# Patient Record
Sex: MALE | Race: WHITE
[De-identification: names, ages, dates, MRNs, and addresses within clinical notes are randomized per-mention and may not be internally consistent; named-entity substitution may affect disease eponyms.]

---

## 2022-05-14 ENCOUNTER — HOSPITAL ENCOUNTER (EMERGENCY)
Dept: HOSPITAL 79 - ER1 | Age: 72
Discharge: HOME | End: 2022-05-14
Payer: MEDICARE

## 2022-05-14 DIAGNOSIS — E11.9: ICD-10-CM

## 2022-05-14 DIAGNOSIS — J44.9: ICD-10-CM

## 2022-05-14 DIAGNOSIS — Z88.0: ICD-10-CM

## 2022-05-14 DIAGNOSIS — R79.9: Primary | ICD-10-CM

## 2022-05-14 DIAGNOSIS — I10: ICD-10-CM

## 2022-05-14 DIAGNOSIS — Z88.2: ICD-10-CM

## 2022-05-14 LAB
BUN/CREATININE RATIO: 31 (ref 0–10)
HGB BLD-MCNC: 14.7 GM/DL (ref 14–17.5)
RED BLOOD COUNT: 4.65 M/UL (ref 4.2–5.5)
WHITE BLOOD COUNT: 5.7 K/UL (ref 4.5–11)

## 2025-06-30 ENCOUNTER — HOSPITAL ENCOUNTER (INPATIENT)
Facility: HOSPITAL | Age: 75
LOS: 8 days | Discharge: HOME | End: 2025-07-09
Attending: FAMILY MEDICINE | Admitting: INTERNAL MEDICINE
Payer: MEDICARE

## 2025-06-30 DIAGNOSIS — I50.31 ACUTE DIASTOLIC HEART FAILURE: Primary | ICD-10-CM

## 2025-06-30 DIAGNOSIS — I35.0 AORTIC VALVE STENOSIS, ETIOLOGY OF CARDIAC VALVE DISEASE UNSPECIFIED: ICD-10-CM

## 2025-06-30 DIAGNOSIS — J96.01 ACUTE RESPIRATORY FAILURE WITH HYPOXIA: ICD-10-CM

## 2025-06-30 PROCEDURE — 94799 UNLISTED PULMONARY SVC/PX: CPT

## 2025-06-30 PROCEDURE — G0378 HOSPITAL OBSERVATION PER HR: HCPCS

## 2025-06-30 RX ORDER — BUDESONIDE AND FORMOTEROL FUMARATE DIHYDRATE 160; 4.5 UG/1; UG/1
2 AEROSOL RESPIRATORY (INHALATION)
COMMUNITY

## 2025-06-30 RX ORDER — ALBUTEROL SULFATE 0.83 MG/ML
2.5 SOLUTION RESPIRATORY (INHALATION) EVERY 6 HOURS PRN
COMMUNITY

## 2025-06-30 RX ORDER — TORSEMIDE 20 MG/1
20 TABLET ORAL DAILY
Status: ON HOLD | COMMUNITY
End: 2025-07-09

## 2025-06-30 RX ORDER — DILTIAZEM HYDROCHLORIDE 360 MG/1
360 CAPSULE, EXTENDED RELEASE ORAL DAILY
COMMUNITY

## 2025-06-30 RX ORDER — ESOMEPRAZOLE MAGNESIUM 40 MG/1
CAPSULE, DELAYED RELEASE ORAL
COMMUNITY
Start: 2025-03-07

## 2025-06-30 RX ORDER — CARVEDILOL 3.12 MG/1
3.12 TABLET ORAL 2 TIMES DAILY WITH MEALS
COMMUNITY
End: 2025-07-09 | Stop reason: HOSPADM

## 2025-06-30 RX ORDER — GLIMEPIRIDE 1 MG/1
1 TABLET ORAL
Status: ON HOLD | COMMUNITY
End: 2025-07-09

## 2025-06-30 RX ORDER — FERROUS SULFATE 325(65) MG
325 TABLET ORAL
COMMUNITY

## 2025-06-30 RX ORDER — LISINOPRIL 10 MG/1
TABLET ORAL
Status: ON HOLD | COMMUNITY
Start: 2025-03-27 | End: 2025-07-01

## 2025-06-30 RX ORDER — PRAVASTATIN SODIUM 20 MG
20 TABLET ORAL NIGHTLY
COMMUNITY

## 2025-06-30 RX ORDER — ASPIRIN 81 MG/1
81 TABLET ORAL DAILY
COMMUNITY

## 2025-06-30 RX ORDER — NIFEDIPINE 30 MG/1
30 TABLET, EXTENDED RELEASE ORAL DAILY
COMMUNITY
End: 2025-07-09 | Stop reason: HOSPADM

## 2025-07-01 ENCOUNTER — APPOINTMENT (OUTPATIENT)
Dept: CARDIOLOGY | Facility: HOSPITAL | Age: 75
End: 2025-07-01
Payer: MEDICARE

## 2025-07-01 ENCOUNTER — APPOINTMENT (OUTPATIENT)
Dept: GENERAL RADIOLOGY | Facility: HOSPITAL | Age: 75
End: 2025-07-01
Payer: MEDICARE

## 2025-07-01 PROBLEM — I50.9 HEART FAILURE: Status: ACTIVE | Noted: 2025-07-01

## 2025-07-01 LAB
ALBUMIN SERPL-MCNC: 2.8 G/DL (ref 3.5–5.2)
ALBUMIN/GLOB SERPL: 0.7 G/DL
ALP SERPL-CCNC: 70 U/L (ref 39–117)
ALT SERPL W P-5'-P-CCNC: 37 U/L (ref 1–41)
ANION GAP SERPL CALCULATED.3IONS-SCNC: 14.5 MMOL/L (ref 5–15)
AORTIC DIMENSIONLESS INDEX: 0.41 (DI)
AST SERPL-CCNC: 21 U/L (ref 1–40)
AV MEAN PRESS GRAD SYS DOP V1V2: 20 MMHG
AV VMAX SYS DOP: 308 CM/SEC
BASOPHILS # BLD AUTO: 0.04 10*3/MM3 (ref 0–0.2)
BASOPHILS NFR BLD AUTO: 0.2 % (ref 0–1.5)
BH CV ECHO MEAS - AO MAX PG: 37.9 MMHG
BH CV ECHO MEAS - AO ROOT AREA (BSA CORRECTED): 1.6 CM2
BH CV ECHO MEAS - AO ROOT DIAM: 3.3 CM
BH CV ECHO MEAS - AO V2 VTI: 53.3 CM
BH CV ECHO MEAS - AVA(I,D): 1.3 CM2
BH CV ECHO MEAS - EDV(CUBED): 64 ML
BH CV ECHO MEAS - EDV(MOD-SP2): 170 ML
BH CV ECHO MEAS - EDV(MOD-SP4): 141 ML
BH CV ECHO MEAS - EF(MOD-SP2): 58.8 %
BH CV ECHO MEAS - EF(MOD-SP4): 59.6 %
BH CV ECHO MEAS - ESV(CUBED): 15.6 ML
BH CV ECHO MEAS - ESV(MOD-SP2): 70 ML
BH CV ECHO MEAS - ESV(MOD-SP4): 56.9 ML
BH CV ECHO MEAS - FS: 37.5 %
BH CV ECHO MEAS - IVS/LVPW: 1 CM
BH CV ECHO MEAS - IVSD: 1.6 CM
BH CV ECHO MEAS - LA DIMENSION: 4.3 CM
BH CV ECHO MEAS - LAT PEAK E' VEL: 7.7 CM/SEC
BH CV ECHO MEAS - LV DIASTOLIC VOL/BSA (35-75): 69.6 CM2
BH CV ECHO MEAS - LV MASS(C)D: 257.9 GRAMS
BH CV ECHO MEAS - LV MAX PG: 4.7 MMHG
BH CV ECHO MEAS - LV MEAN PG: 2.5 MMHG
BH CV ECHO MEAS - LV SYSTOLIC VOL/BSA (12-30): 28.1 CM2
BH CV ECHO MEAS - LV V1 MAX: 108 CM/SEC
BH CV ECHO MEAS - LV V1 VTI: 22.1 CM
BH CV ECHO MEAS - LVIDD: 4 CM
BH CV ECHO MEAS - LVIDS: 2.5 CM
BH CV ECHO MEAS - LVOT AREA: 3.1 CM2
BH CV ECHO MEAS - LVOT DIAM: 2 CM
BH CV ECHO MEAS - LVPWD: 1.6 CM
BH CV ECHO MEAS - MED PEAK E' VEL: 5.4 CM/SEC
BH CV ECHO MEAS - MV A MAX VEL: 111 CM/SEC
BH CV ECHO MEAS - MV DEC SLOPE: 749 CM/SEC2
BH CV ECHO MEAS - MV DEC TIME: 0.16 SEC
BH CV ECHO MEAS - MV E MAX VEL: 119 CM/SEC
BH CV ECHO MEAS - MV E/A: 1.07
BH CV ECHO MEAS - MV MAX PG: 6.5 MMHG
BH CV ECHO MEAS - MV MEAN PG: 3.2 MMHG
BH CV ECHO MEAS - MV P1/2T: 49.7 MSEC
BH CV ECHO MEAS - MV V2 VTI: 35.3 CM
BH CV ECHO MEAS - MVA(P1/2T): 4.4 CM2
BH CV ECHO MEAS - MVA(VTI): 1.97 CM2
BH CV ECHO MEAS - SV(LVOT): 69.4 ML
BH CV ECHO MEAS - SV(MOD-SP2): 100 ML
BH CV ECHO MEAS - SV(MOD-SP4): 84.1 ML
BH CV ECHO MEAS - SVI(LVOT): 34.3 ML/M2
BH CV ECHO MEAS - SVI(MOD-SP2): 49.4 ML/M2
BH CV ECHO MEAS - SVI(MOD-SP4): 41.5 ML/M2
BH CV ECHO MEAS - TAPSE (>1.6): 1.88 CM
BH CV ECHO MEASUREMENTS AVERAGE E/E' RATIO: 18.17
BH CV XLRA - RV BASE: 3.6 CM
BH CV XLRA - RV LENGTH: 6.4 CM
BH CV XLRA - RV MID: 2.7 CM
BH CV XLRA - TDI S': 6.4 CM/SEC
BH CV XLRA MEAS LEFT DIST CCA EDV: 15.6 CM/SEC
BH CV XLRA MEAS LEFT DIST CCA PSV: 74.7 CM/SEC
BH CV XLRA MEAS LEFT DIST ICA EDV: 27.1 CM/SEC
BH CV XLRA MEAS LEFT DIST ICA PSV: 120 CM/SEC
BH CV XLRA MEAS LEFT ICA/CCA RATIO: 2.16
BH CV XLRA MEAS LEFT MID CCA EDV: 18.2 CM/SEC
BH CV XLRA MEAS LEFT MID CCA PSV: 96.1 CM/SEC
BH CV XLRA MEAS LEFT MID ICA EDV: 82.8 CM/SEC
BH CV XLRA MEAS LEFT MID ICA PSV: 111 CM/SEC
BH CV XLRA MEAS LEFT PROX CCA EDV: 15.6 CM/SEC
BH CV XLRA MEAS LEFT PROX CCA PSV: 92.2 CM/SEC
BH CV XLRA MEAS LEFT PROX ECA EDV: 10.5 CM/SEC
BH CV XLRA MEAS LEFT PROX ECA PSV: 177.4 CM/SEC
BH CV XLRA MEAS LEFT PROX ICA EDV: 40 CM/SEC
BH CV XLRA MEAS LEFT PROX ICA PSV: 161.4 CM/SEC
BH CV XLRA MEAS LEFT PROX SCLA PSV: 92.1 CM/SEC
BH CV XLRA MEAS LEFT VERTEBRAL A EDV: 8.1 CM/SEC
BH CV XLRA MEAS LEFT VERTEBRAL A PSV: 15.9 CM/SEC
BH CV XLRA MEAS RIGHT DIST CCA EDV: 16.1 CM/SEC
BH CV XLRA MEAS RIGHT DIST CCA PSV: 61.9 CM/SEC
BH CV XLRA MEAS RIGHT DIST ICA EDV: 19.3 CM/SEC
BH CV XLRA MEAS RIGHT DIST ICA PSV: 117.8 CM/SEC
BH CV XLRA MEAS RIGHT ICA/CCA RATIO: 1.81
BH CV XLRA MEAS RIGHT MID CCA EDV: 11.9 CM/SEC
BH CV XLRA MEAS RIGHT MID CCA PSV: 73.2 CM/SEC
BH CV XLRA MEAS RIGHT MID ICA EDV: 19.3 CM/SEC
BH CV XLRA MEAS RIGHT MID ICA PSV: 107.8 CM/SEC
BH CV XLRA MEAS RIGHT PROX CCA EDV: 16.7 CM/SEC
BH CV XLRA MEAS RIGHT PROX CCA PSV: 69.6 CM/SEC
BH CV XLRA MEAS RIGHT PROX ECA EDV: 19 CM/SEC
BH CV XLRA MEAS RIGHT PROX ECA PSV: 350.4 CM/SEC
BH CV XLRA MEAS RIGHT PROX ICA EDV: 20.7 CM/SEC
BH CV XLRA MEAS RIGHT PROX ICA PSV: 112.1 CM/SEC
BH CV XLRA MEAS RIGHT PROX SCLA PSV: 219 CM/SEC
BH CV XLRA MEAS RIGHT VERTEBRAL A EDV: 20.7 CM/SEC
BH CV XLRA MEAS RIGHT VERTEBRAL A PSV: 92.1 CM/SEC
BILIRUB SERPL-MCNC: <0.2 MG/DL (ref 0–1.2)
BUN SERPL-MCNC: 75.5 MG/DL (ref 8–23)
BUN/CREAT SERPL: 26.1 (ref 7–25)
CALCIUM SPEC-SCNC: 8.6 MG/DL (ref 8.6–10.5)
CHLORIDE SERPL-SCNC: 105 MMOL/L (ref 98–107)
CO2 SERPL-SCNC: 18.5 MMOL/L (ref 22–29)
CREAT SERPL-MCNC: 2.89 MG/DL (ref 0.76–1.27)
DEPRECATED RDW RBC AUTO: 58.2 FL (ref 37–54)
EGFRCR SERPLBLD CKD-EPI 2021: 22.1 ML/MIN/1.73
EOSINOPHIL # BLD AUTO: 0 10*3/MM3 (ref 0–0.4)
EOSINOPHIL NFR BLD AUTO: 0 % (ref 0.3–6.2)
ERYTHROCYTE [DISTWIDTH] IN BLOOD BY AUTOMATED COUNT: 18.8 % (ref 12.3–15.4)
GEN 5 1HR TROPONIN T REFLEX: 37 NG/L
GLOBULIN UR ELPH-MCNC: 3.9 GM/DL
GLUCOSE BLDC GLUCOMTR-MCNC: 203 MG/DL (ref 70–130)
GLUCOSE BLDC GLUCOMTR-MCNC: 226 MG/DL (ref 70–130)
GLUCOSE BLDC GLUCOMTR-MCNC: 248 MG/DL (ref 70–130)
GLUCOSE BLDC GLUCOMTR-MCNC: 266 MG/DL (ref 70–130)
GLUCOSE SERPL-MCNC: 320 MG/DL (ref 65–99)
HBA1C MFR BLD: 7.33 % (ref 4.8–5.6)
HCT VFR BLD AUTO: 32.7 % (ref 37.5–51)
HGB BLD-MCNC: 9.8 G/DL (ref 13–17.7)
IMM GRANULOCYTES # BLD AUTO: 0.34 10*3/MM3 (ref 0–0.05)
IMM GRANULOCYTES NFR BLD AUTO: 1.6 % (ref 0–0.5)
INR PPP: 1.16 (ref 0.89–1.12)
LEFT ATRIUM VOLUME INDEX: 42.5 ML/M2
LV EF BIPLANE MOD: 61.5 %
LYMPHOCYTES # BLD AUTO: 0.61 10*3/MM3 (ref 0.7–3.1)
LYMPHOCYTES NFR BLD AUTO: 2.9 % (ref 19.6–45.3)
MAGNESIUM SERPL-MCNC: 2.2 MG/DL (ref 1.6–2.4)
MAGNESIUM SERPL-MCNC: 2.3 MG/DL (ref 1.6–2.4)
MCH RBC QN AUTO: 25.7 PG (ref 26.6–33)
MCHC RBC AUTO-ENTMCNC: 30 G/DL (ref 31.5–35.7)
MCV RBC AUTO: 85.6 FL (ref 79–97)
MONOCYTES # BLD AUTO: 0.54 10*3/MM3 (ref 0.1–0.9)
MONOCYTES NFR BLD AUTO: 2.6 % (ref 5–12)
NEUTROPHILS NFR BLD AUTO: 19.57 10*3/MM3 (ref 1.7–7)
NEUTROPHILS NFR BLD AUTO: 92.7 % (ref 42.7–76)
NRBC BLD AUTO-RTO: 0 /100 WBC (ref 0–0.2)
NT-PROBNP SERPL-MCNC: 4325 PG/ML (ref 0–900)
PLATELET # BLD AUTO: 406 10*3/MM3 (ref 140–450)
PMV BLD AUTO: 8.7 FL (ref 6–12)
POTASSIUM SERPL-SCNC: 4.6 MMOL/L (ref 3.5–5.2)
PROT SERPL-MCNC: 6.7 G/DL (ref 6–8.5)
PROTHROMBIN TIME: 15.5 SECONDS (ref 12.2–15.3)
RBC # BLD AUTO: 3.82 10*6/MM3 (ref 4.14–5.8)
SODIUM SERPL-SCNC: 138 MMOL/L (ref 136–145)
TROPONIN T % DELTA: 9
TROPONIN T NUMERIC DELTA: 3 NG/L
TROPONIN T SERPL HS-MCNC: 34 NG/L
TSH SERPL DL<=0.05 MIU/L-ACNC: 0.63 UIU/ML (ref 0.27–4.2)
WBC NRBC COR # BLD AUTO: 21.1 10*3/MM3 (ref 3.4–10.8)

## 2025-07-01 PROCEDURE — 87070 CULTURE OTHR SPECIMN AEROBIC: CPT | Performed by: NURSE PRACTITIONER

## 2025-07-01 PROCEDURE — 83036 HEMOGLOBIN GLYCOSYLATED A1C: CPT | Performed by: NURSE PRACTITIONER

## 2025-07-01 PROCEDURE — 84443 ASSAY THYROID STIM HORMONE: CPT | Performed by: NURSE PRACTITIONER

## 2025-07-01 PROCEDURE — 87040 BLOOD CULTURE FOR BACTERIA: CPT | Performed by: NURSE PRACTITIONER

## 2025-07-01 PROCEDURE — 93010 ELECTROCARDIOGRAM REPORT: CPT | Performed by: INTERNAL MEDICINE

## 2025-07-01 PROCEDURE — 94640 AIRWAY INHALATION TREATMENT: CPT

## 2025-07-01 PROCEDURE — 82948 REAGENT STRIP/BLOOD GLUCOSE: CPT

## 2025-07-01 PROCEDURE — 93306 TTE W/DOPPLER COMPLETE: CPT

## 2025-07-01 PROCEDURE — 85025 COMPLETE CBC W/AUTO DIFF WBC: CPT | Performed by: PHYSICIAN ASSISTANT

## 2025-07-01 PROCEDURE — 99221 1ST HOSP IP/OBS SF/LOW 40: CPT | Performed by: THORACIC SURGERY (CARDIOTHORACIC VASCULAR SURGERY)

## 2025-07-01 PROCEDURE — 99222 1ST HOSP IP/OBS MODERATE 55: CPT | Performed by: INTERNAL MEDICINE

## 2025-07-01 PROCEDURE — 87205 SMEAR GRAM STAIN: CPT | Performed by: NURSE PRACTITIONER

## 2025-07-01 PROCEDURE — 83735 ASSAY OF MAGNESIUM: CPT | Performed by: PHYSICIAN ASSISTANT

## 2025-07-01 PROCEDURE — 63710000001 INSULIN LISPRO (HUMAN) PER 5 UNITS: Performed by: NURSE PRACTITIONER

## 2025-07-01 PROCEDURE — 94664 DEMO&/EVAL PT USE INHALER: CPT

## 2025-07-01 PROCEDURE — 80053 COMPREHEN METABOLIC PANEL: CPT | Performed by: PHYSICIAN ASSISTANT

## 2025-07-01 PROCEDURE — 93880 EXTRACRANIAL BILAT STUDY: CPT | Performed by: INTERNAL MEDICINE

## 2025-07-01 PROCEDURE — 93880 EXTRACRANIAL BILAT STUDY: CPT

## 2025-07-01 PROCEDURE — 94799 UNLISTED PULMONARY SVC/PX: CPT

## 2025-07-01 PROCEDURE — 93005 ELECTROCARDIOGRAM TRACING: CPT | Performed by: PHYSICIAN ASSISTANT

## 2025-07-01 PROCEDURE — 84484 ASSAY OF TROPONIN QUANT: CPT | Performed by: PHYSICIAN ASSISTANT

## 2025-07-01 PROCEDURE — 25010000002 HEPARIN (PORCINE) PER 1000 UNITS: Performed by: INTERNAL MEDICINE

## 2025-07-01 PROCEDURE — 25010000002 HEPARIN (PORCINE) PER 1000 UNITS: Performed by: NURSE PRACTITIONER

## 2025-07-01 PROCEDURE — 25010000002 CEFEPIME PER 500 MG

## 2025-07-01 PROCEDURE — 71045 X-RAY EXAM CHEST 1 VIEW: CPT

## 2025-07-01 PROCEDURE — 85610 PROTHROMBIN TIME: CPT | Performed by: NURSE PRACTITIONER

## 2025-07-01 PROCEDURE — 63710000001 INSULIN LISPRO (HUMAN) PER 5 UNITS: Performed by: INTERNAL MEDICINE

## 2025-07-01 PROCEDURE — 83880 ASSAY OF NATRIURETIC PEPTIDE: CPT | Performed by: NURSE PRACTITIONER

## 2025-07-01 PROCEDURE — 83735 ASSAY OF MAGNESIUM: CPT | Performed by: NURSE PRACTITIONER

## 2025-07-01 PROCEDURE — 93306 TTE W/DOPPLER COMPLETE: CPT | Performed by: INTERNAL MEDICINE

## 2025-07-01 PROCEDURE — 99223 1ST HOSP IP/OBS HIGH 75: CPT | Performed by: INTERNAL MEDICINE

## 2025-07-01 RX ORDER — DEXTROSE MONOHYDRATE 25 G/50ML
25 INJECTION, SOLUTION INTRAVENOUS
Status: DISCONTINUED | OUTPATIENT
Start: 2025-07-01 | End: 2025-07-09 | Stop reason: HOSPADM

## 2025-07-01 RX ORDER — NITROGLYCERIN 0.4 MG/1
0.4 TABLET SUBLINGUAL
Status: DISCONTINUED | OUTPATIENT
Start: 2025-07-01 | End: 2025-07-07 | Stop reason: SDUPTHER

## 2025-07-01 RX ORDER — PRAVASTATIN SODIUM 20 MG
20 TABLET ORAL NIGHTLY
Status: DISCONTINUED | OUTPATIENT
Start: 2025-07-01 | End: 2025-07-09 | Stop reason: HOSPADM

## 2025-07-01 RX ORDER — ALBUTEROL SULFATE 0.83 MG/ML
2.5 SOLUTION RESPIRATORY (INHALATION) EVERY 6 HOURS PRN
Status: DISCONTINUED | OUTPATIENT
Start: 2025-07-01 | End: 2025-07-08 | Stop reason: SDUPTHER

## 2025-07-01 RX ORDER — CARVEDILOL 3.12 MG/1
3.12 TABLET ORAL 2 TIMES DAILY WITH MEALS
Status: DISCONTINUED | OUTPATIENT
Start: 2025-07-01 | End: 2025-07-01

## 2025-07-01 RX ORDER — METOPROLOL TARTRATE 25 MG/1
25 TABLET, FILM COATED ORAL EVERY 12 HOURS SCHEDULED
Status: DISCONTINUED | OUTPATIENT
Start: 2025-07-01 | End: 2025-07-03

## 2025-07-01 RX ORDER — ASPIRIN 81 MG/1
81 TABLET ORAL DAILY
Status: DISCONTINUED | OUTPATIENT
Start: 2025-07-01 | End: 2025-07-09 | Stop reason: HOSPADM

## 2025-07-01 RX ORDER — POLYETHYLENE GLYCOL 3350 17 G/17G
17 POWDER, FOR SOLUTION ORAL DAILY PRN
Status: DISCONTINUED | OUTPATIENT
Start: 2025-07-01 | End: 2025-07-09 | Stop reason: HOSPADM

## 2025-07-01 RX ORDER — AMOXICILLIN 250 MG
2 CAPSULE ORAL 2 TIMES DAILY PRN
Status: DISCONTINUED | OUTPATIENT
Start: 2025-07-01 | End: 2025-07-09 | Stop reason: HOSPADM

## 2025-07-01 RX ORDER — ACETAMINOPHEN 325 MG/1
650 TABLET ORAL EVERY 4 HOURS PRN
Status: DISCONTINUED | OUTPATIENT
Start: 2025-07-01 | End: 2025-07-07 | Stop reason: SDUPTHER

## 2025-07-01 RX ORDER — HEPARIN SODIUM 5000 [USP'U]/ML
5000 INJECTION, SOLUTION INTRAVENOUS; SUBCUTANEOUS EVERY 12 HOURS SCHEDULED
Status: DISCONTINUED | OUTPATIENT
Start: 2025-07-01 | End: 2025-07-09 | Stop reason: HOSPADM

## 2025-07-01 RX ORDER — FERROUS SULFATE 325(65) MG
325 TABLET ORAL
Status: DISCONTINUED | OUTPATIENT
Start: 2025-07-01 | End: 2025-07-08

## 2025-07-01 RX ORDER — LISINOPRIL 10 MG/1
10 TABLET ORAL
Status: DISCONTINUED | OUTPATIENT
Start: 2025-07-01 | End: 2025-07-02

## 2025-07-01 RX ORDER — INSULIN LISPRO 100 [IU]/ML
2-7 INJECTION, SOLUTION INTRAVENOUS; SUBCUTANEOUS
Status: DISCONTINUED | OUTPATIENT
Start: 2025-07-01 | End: 2025-07-09 | Stop reason: HOSPADM

## 2025-07-01 RX ORDER — SODIUM CHLORIDE 0.9 % (FLUSH) 0.9 %
10 SYRINGE (ML) INJECTION EVERY 12 HOURS SCHEDULED
Status: DISCONTINUED | OUTPATIENT
Start: 2025-07-01 | End: 2025-07-09 | Stop reason: HOSPADM

## 2025-07-01 RX ORDER — SODIUM CHLORIDE 9 MG/ML
40 INJECTION, SOLUTION INTRAVENOUS AS NEEDED
Status: DISCONTINUED | OUTPATIENT
Start: 2025-07-01 | End: 2025-07-09 | Stop reason: HOSPADM

## 2025-07-01 RX ORDER — DILTIAZEM HYDROCHLORIDE 120 MG/1
120 CAPSULE, COATED, EXTENDED RELEASE ORAL
Status: DISCONTINUED | OUTPATIENT
Start: 2025-07-01 | End: 2025-07-02

## 2025-07-01 RX ORDER — NICOTINE POLACRILEX 4 MG
15 LOZENGE BUCCAL
Status: DISCONTINUED | OUTPATIENT
Start: 2025-07-01 | End: 2025-07-09 | Stop reason: HOSPADM

## 2025-07-01 RX ORDER — IBUPROFEN 600 MG/1
1 TABLET ORAL
Status: DISCONTINUED | OUTPATIENT
Start: 2025-07-01 | End: 2025-07-09 | Stop reason: HOSPADM

## 2025-07-01 RX ORDER — TORSEMIDE 20 MG/1
20 TABLET ORAL DAILY
Status: DISCONTINUED | OUTPATIENT
Start: 2025-07-01 | End: 2025-07-09 | Stop reason: HOSPADM

## 2025-07-01 RX ORDER — ACETAMINOPHEN 650 MG/1
650 SUPPOSITORY RECTAL EVERY 4 HOURS PRN
Status: DISCONTINUED | OUTPATIENT
Start: 2025-07-01 | End: 2025-07-08 | Stop reason: SDUPTHER

## 2025-07-01 RX ORDER — PANTOPRAZOLE SODIUM 40 MG/1
40 TABLET, DELAYED RELEASE ORAL
Status: DISCONTINUED | OUTPATIENT
Start: 2025-07-01 | End: 2025-07-09 | Stop reason: HOSPADM

## 2025-07-01 RX ORDER — BISACODYL 10 MG
10 SUPPOSITORY, RECTAL RECTAL DAILY PRN
Status: DISCONTINUED | OUTPATIENT
Start: 2025-07-01 | End: 2025-07-09 | Stop reason: HOSPADM

## 2025-07-01 RX ORDER — NIFEDIPINE 30 MG/1
30 TABLET, EXTENDED RELEASE ORAL DAILY
Status: DISCONTINUED | OUTPATIENT
Start: 2025-07-01 | End: 2025-07-03

## 2025-07-01 RX ORDER — BUDESONIDE AND FORMOTEROL FUMARATE DIHYDRATE 160; 4.5 UG/1; UG/1
2 AEROSOL RESPIRATORY (INHALATION)
Status: DISCONTINUED | OUTPATIENT
Start: 2025-07-01 | End: 2025-07-02

## 2025-07-01 RX ORDER — ACETAMINOPHEN 160 MG/5ML
650 SOLUTION ORAL EVERY 4 HOURS PRN
Status: DISCONTINUED | OUTPATIENT
Start: 2025-07-01 | End: 2025-07-08 | Stop reason: SDUPTHER

## 2025-07-01 RX ORDER — SODIUM CHLORIDE 0.9 % (FLUSH) 0.9 %
10 SYRINGE (ML) INJECTION AS NEEDED
Status: DISCONTINUED | OUTPATIENT
Start: 2025-07-01 | End: 2025-07-09 | Stop reason: HOSPADM

## 2025-07-01 RX ORDER — BISACODYL 5 MG/1
5 TABLET, DELAYED RELEASE ORAL DAILY PRN
Status: DISCONTINUED | OUTPATIENT
Start: 2025-07-01 | End: 2025-07-09 | Stop reason: HOSPADM

## 2025-07-01 RX ADMIN — CARVEDILOL 3.12 MG: 3.12 TABLET, FILM COATED ORAL at 08:22

## 2025-07-01 RX ADMIN — PRAVASTATIN SODIUM 20 MG: 20 TABLET ORAL at 21:12

## 2025-07-01 RX ADMIN — Medication 10 ML: at 08:38

## 2025-07-01 RX ADMIN — BUDESONIDE AND FORMOTEROL FUMARATE DIHYDRATE 2 PUFF: 160; 4.5 AEROSOL RESPIRATORY (INHALATION) at 20:51

## 2025-07-01 RX ADMIN — CEFEPIME 2000 MG: 2 INJECTION, POWDER, FOR SOLUTION INTRAVENOUS at 05:40

## 2025-07-01 RX ADMIN — POLYETHYLENE GLYCOL 3350 17 G: 17 POWDER, FOR SOLUTION ORAL at 13:14

## 2025-07-01 RX ADMIN — INSULIN LISPRO 3 UNITS: 100 INJECTION, SOLUTION INTRAVENOUS; SUBCUTANEOUS at 21:12

## 2025-07-01 RX ADMIN — PANTOPRAZOLE SODIUM 40 MG: 40 TABLET, DELAYED RELEASE ORAL at 05:48

## 2025-07-01 RX ADMIN — BUDESONIDE AND FORMOTEROL FUMARATE DIHYDRATE 2 PUFF: 160; 4.5 AEROSOL RESPIRATORY (INHALATION) at 11:08

## 2025-07-01 RX ADMIN — METOPROLOL TARTRATE 25 MG: 25 TABLET, FILM COATED ORAL at 21:12

## 2025-07-01 RX ADMIN — HEPARIN SODIUM 5000 UNITS: 5000 INJECTION INTRAVENOUS; SUBCUTANEOUS at 08:20

## 2025-07-01 RX ADMIN — INSULIN LISPRO 4 UNITS: 100 INJECTION, SOLUTION INTRAVENOUS; SUBCUTANEOUS at 12:54

## 2025-07-01 RX ADMIN — NIFEDIPINE 30 MG: 30 TABLET, EXTENDED RELEASE ORAL at 08:21

## 2025-07-01 RX ADMIN — ASPIRIN 81 MG: 81 TABLET, COATED ORAL at 08:21

## 2025-07-01 RX ADMIN — METOPROLOL TARTRATE 25 MG: 25 TABLET, FILM COATED ORAL at 12:53

## 2025-07-01 RX ADMIN — Medication 10 ML: at 21:13

## 2025-07-01 RX ADMIN — FERROUS SULFATE TAB 325 MG (65 MG ELEMENTAL FE) 325 MG: 325 (65 FE) TAB at 08:22

## 2025-07-01 RX ADMIN — INSULIN LISPRO 3 UNITS: 100 INJECTION, SOLUTION INTRAVENOUS; SUBCUTANEOUS at 08:22

## 2025-07-01 RX ADMIN — HEPARIN SODIUM 5000 UNITS: 5000 INJECTION INTRAVENOUS; SUBCUTANEOUS at 21:12

## 2025-07-01 RX ADMIN — INSULIN LISPRO 3 UNITS: 100 INJECTION, SOLUTION INTRAVENOUS; SUBCUTANEOUS at 18:15

## 2025-07-01 NOTE — CONSULTS
[facility] at [facility]   Consult Note    Referring Provider: Veronica Ramirez MD Hays, Samantha B, MD    Reason for Consultation: Severe aortic stenosis, CHF    Patient Care Team:  Aparna Mendoza MD as PCP - General (Family Medicine)      Problem List:    Valvular heart disease   Echo 1/2025: Moderate aortic stenosis  Echo 6/30/2025 (outside facility): Report states severe aortic valve stenosis, normal EF but the mean aortic valve gradient is only 22 mmHg.  HFpEF  Echo 6/30/2025 (outside facility): Report states severe aortic valve stenosis, normal EF however the mean aortic valve gradient is only 22 mmHg.  Hypertension  Hyperlipidemia  Diabetes mellitus  Chronic kidney disease  COPD  Chronic anemia        Past Surgical History:   Procedure Laterality Date    BRONCHOSCOPY      CHOLECYSTECTOMY           Allergies   Allergen Reactions    Sulfa Antibiotics Anaphylaxis           Current Facility-Administered Medications:     acetaminophen (TYLENOL) tablet 650 mg, 650 mg, Oral, Q4H PRN **OR** acetaminophen (TYLENOL) 160 MG/5ML oral solution 650 mg, 650 mg, Oral, Q4H PRN **OR** acetaminophen (TYLENOL) suppository 650 mg, 650 mg, Rectal, Q4H PRN, Dedra Pelletier, APRN    albuterol (PROVENTIL) nebulizer solution 0.083% 2.5 mg/3mL, 2.5 mg, Nebulization, Q6H PRN, Dedra Pelletier APRN    aspirin EC tablet 81 mg, 81 mg, Oral, Daily, Dedra Pelletier APRN, 81 mg at 07/01/25 0821    sennosides-docusate (PERICOLACE) 8.6-50 MG per tablet 2 tablet, 2 tablet, Oral, BID PRN **AND** polyethylene glycol (MIRALAX) packet 17 g, 17 g, Oral, Daily PRN **AND** bisacodyl (DULCOLAX) EC tablet 5 mg, 5 mg, Oral, Daily PRN **AND** bisacodyl (DULCOLAX) suppository 10 mg, 10 mg, Rectal, Daily PRN, Dedra Pelletier, APRN    budesonide-formoterol (SYMBICORT) 160-4.5 MCG/ACT inhaler 2 puff, 2 puff, Inhalation, BID - RT, Dedra Pelletier APRN    carvedilol (COREG) tablet 3.125 mg, 3.125 mg, Oral, BID With  Meals, Dedra Pelletier APRN, 3.125 mg at 07/01/25 0822    cefepime 2000 mg IVPB in 100 mL NS (MBP), 2,000 mg, Intravenous, Q24H, Baldemar Murry, formerly [facility], 2,000 mg at 07/01/25 0540    dextrose (D50W) (25 g/50 mL) IV injection 25 g, 25 g, Intravenous, Q15 Min PRN, Dedra Pelletier APRN    dextrose (GLUTOSE) oral gel 15 g, 15 g, Oral, Q15 Min PRN, Dedra Pelletier APRN    [Held by provider] dilTIAZem CD (CARDIZEM CD) 24 hr capsule 120 mg, 120 mg, Oral, Q24H, Dedra Pelletier APRN    ferrous sulfate tablet 325 mg, 325 mg, Oral, Daily With Breakfast, Dedra Pelletier APRN, 325 mg at 07/01/25 0822    glucagon (GLUCAGEN) injection 1 mg, 1 mg, Intramuscular, Q15 Min PRN, Dedra Pelletier APRN    heparin (porcine) 5000 UNIT/ML injection 5,000 Units, 5,000 Units, Subcutaneous, Q12H, Dedra Pelletier APRN, 5,000 Units at 07/01/25 0820    Insulin Lispro (humaLOG) injection 2-7 Units, 2-7 Units, Subcutaneous, 4x Daily AC & at Bedtime, Dedra Pelletier APRN, 3 Units at 07/01/25 0822    [Held by provider] lisinopril (PRINIVIL,ZESTRIL) tablet 10 mg, 10 mg, Oral, Q24H, Dedra Pelletier APRN    NIFEdipine XL (PROCARDIA XL) 24 hr tablet 30 mg, 30 mg, Oral, Daily, Dedra Pelletier APRN, 30 mg at 07/01/25 0821    nitroglycerin (NITROSTAT) SL tablet 0.4 mg, 0.4 mg, Sublingual, Q5 Min PRN, Dedra Pelletier APRN    pantoprazole (PROTONIX) EC tablet 40 mg, 40 mg, Oral, Q AM, Dedra Pelletier APRN, 40 mg at 07/01/25 0548    Pharmacy to Dose: Cefepime, , Not Applicable, Continuous PRN, Dedra Pelletier, HARRISON    pravastatin (PRAVACHOL) tablet 20 mg, 20 mg, Oral, Nightly, Dedra Pelletier APRN    sodium chloride 0.9 % flush 10 mL, 10 mL, Intravenous, Q12H, Dedra Pelletier APRN, 10 mL at 07/01/25 0838    sodium chloride 0.9 % flush 10 mL, 10 mL, Intravenous, PRN, Dedra Pelletier APRN    sodium chloride 0.9 % infusion 40 mL, 40 mL, Intravenous, PRN, Dedra Pelletier  HARRISON HAJI    [Held by provider] torsemide (DEMADEX) tablet 20 mg, 20 mg, Oral, Daily, Dedra Pelletier HARRISON HAJI    Pharmacy To Dose:,         Medications Prior to Admission   Medication Sig Dispense Refill Last Dose/Taking    aspirin 81 MG EC tablet Take 1 tablet by mouth Daily.   6/30/2025    carvedilol (COREG) 3.125 MG tablet Take 1 tablet by mouth 2 (Two) Times a Day With Meals.   6/30/2025    NIFEdipine XL (PROCARDIA XL) 30 MG 24 hr tablet Take 1 tablet by mouth Daily.   6/30/2025    albuterol (PROVENTIL) (2.5 MG/3ML) 0.083% nebulizer solution Take 2.5 mg by nebulization Every 6 (Six) Hours As Needed for Wheezing.   Unknown    budesonide-formoterol (SYMBICORT) 160-4.5 MCG/ACT inhaler Inhale 2 puffs 2 (Two) Times a Day.   Unknown    dilTIAZem (TIAZAC) 120 MG 24 hr capsule Take 3 capsules by mouth Daily.   Unknown    esomeprazole (nexIUM) 40 MG capsule    Unknown    ferrous sulfate 325 (65 FE) MG tablet Take 1 tablet by mouth Daily With Breakfast.   Unknown    glimepiride (AMARYL) 1 MG tablet Take 1 tablet by mouth Every Morning Before Breakfast.   Unknown    lisinopril (PRINIVIL,ZESTRIL) 10 MG tablet    Unknown    pravastatin (PRAVACHOL) 20 MG tablet Take 1 tablet by mouth Every Night.   Unknown    torsemide (DEMADEX) 20 MG tablet Take 1 tablet by mouth Daily.   Unknown         Subjective   History of present illness: Patient is 75-year-old male with above past medical history who was transferred from [facility] [place] for evaluation of a TAVR.  Patient presented to outside hospital on Friday with complaints of sinus drainage and sore throat approximately 1 week, productive cough with brown sputum, shortness of breath, wheezing.  Patient is oxygen dependent at home on 4 L.  When his oxygen started to drop to 70s, that prompted him to come to the emergency room.  At the outside facility patient was placed on BiPAP, but rest of the respiratory workup was negative.  The decision was made to transfer  patient for higher level of care and possible evaluation of a TAVR.  Outside facility labs:  Sodium 127, was given Samsca (Na 138 now).  Potassium 4.0, chloride 102, BUN 29, creatinine 3.02, glucose 236, proBNP 1,543.  Kidney ultrasound unremarkable.  Cardiology was consulted to evaluate the patient.     Review of records show patient presented to ER on March 29 and was admitted on March 31 for shortness of breath due to respiratory failure secondary to pulmonary edema.  Patient had COPD exacerbation with hemoptysis.    Upon my visit patient is sitting up in bed on high flow nasal cannula.  Stated that he has been feeling short of breath for the past 6 months and been sleeping in recliner.  Reports orthopnea, ARZOLA, mild substernal chest pressure (no history of CAD). For the past week his shortness of breath has increased, which prompted him to come to the emergency room.  Patient reports taking all medications as prescribed but lately blood pressure been elevated -170.  High-sensitivity troponin 34, 37.       Social History     Socioeconomic History    Marital status:    Tobacco Use    Smoking status: Former     Types: Cigarettes    Smokeless tobacco: Never   Vaping Use    Vaping status: Never Used   Substance and Sexual Activity    Alcohol use: Never    Drug use: Never    Sexual activity: Defer     Family History   Problem Relation Age of Onset    Heart failure Mother     Pneumonia Father          Review of Systems:  Review of Systems   Constitutional: Negative for chills and fever.   HENT:  Negative for nosebleeds.    Eyes:  Negative for visual disturbance.   Cardiovascular:  Positive for dyspnea on exertion and orthopnea. Negative for chest pain, palpitations, paroxysmal nocturnal dyspnea and syncope.   Respiratory:  Positive for cough, shortness of breath, sleep disturbances due to breathing and sputum production. Negative for wheezing.         Home O2 dependent  Sometimes coughs out brown sputum  "  Hematologic/Lymphatic: Does not bruise/bleed easily.   Skin:  Negative for flushing, itching and rash.   Musculoskeletal:  Negative for falls, joint pain and muscle cramps.   Gastrointestinal:  Negative for abdominal pain, diarrhea, hematemesis, hematochezia, nausea and vomiting.   Genitourinary:  Negative for hematuria.   Neurological:  Negative for excessive daytime sleepiness, dizziness, headaches, tremors and vertigo.   Psychiatric/Behavioral:  Negative for substance abuse.    All other systems reviewed and are negative.             Objective   Vitals:  /68 (BP Location: Left arm, Patient Position: Lying)   Pulse 94   Temp 97.7 °F (36.5 °C) (Oral)   Resp 18   Ht 180.3 cm (71\")   Wt 83 kg (182 lb 15.7 oz)   SpO2 100%   BMI 25.52 kg/m²      Intake/Output Summary (Last 24 hours) at 7/1/2025 0939  Last data filed at 7/1/2025 0900  Gross per 24 hour   Intake 240 ml   Output 1350 ml   Net -1110 ml       Constitutional:       General: Awake.      Appearance: Normal appearance. Not in distress.   Pulmonary:      Effort: Pulmonary effort is normal.      Breath sounds: Normal breath sounds.      Comments: On high flow nasal cannula.  Chest:      Chest wall: Not tender to palpatation.   Cardiovascular:      Normal rate. Regular rhythm.      Murmurs:  2-3/6      No gallop.  No click. No rub.   Pulses:     Radial: 2+ bilaterally.     Femoral: 2+ bilaterally.     Dorsalis pedis: 2+ bilaterally.     Posterior tibial: 2+ bilaterally.  Edema:     Peripheral edema absent.   Abdominal:      General: Bowel sounds are normal.   Musculoskeletal: Normal range of motion.      Cervical back: Normal range of motion. Skin:     General: Skin is warm and dry.   Neurological:      Mental Status: Alert and oriented to person, place and time.   Psychiatric:         Behavior: Behavior is cooperative.              Results Review:  I reviewed the patient's new clinical results.  Results from last 7 days   Lab Units 07/01/25  0100 "   WBC 10*3/mm3 21.10*   HEMOGLOBIN g/dL 9.8*   HEMATOCRIT % 32.7*   PLATELETS 10*3/mm3 406     Results from last 7 days   Lab Units 07/01/25  0100   SODIUM mmol/L 138   POTASSIUM mmol/L 4.6   CHLORIDE mmol/L 105   CO2 mmol/L 18.5*   BUN mg/dL 75.5*   CREATININE mg/dL 2.89*   CALCIUM mg/dL 8.6   BILIRUBIN mg/dL <0.2   ALK PHOS U/L 70   ALT (SGPT) U/L 37   AST (SGOT) U/L 21   GLUCOSE mg/dL 320*     Results from last 7 days   Lab Units 07/01/25  0100   SODIUM mmol/L 138   POTASSIUM mmol/L 4.6   CHLORIDE mmol/L 105   CO2 mmol/L 18.5*   BUN mg/dL 75.5*   CREATININE mg/dL 2.89*   GLUCOSE mg/dL 320*   CALCIUM mg/dL 8.6     Results from last 7 days   Lab Units 07/01/25  0429 06/27/25  1450   INR  1.16* 1.06     Lab Results   Lab Value Date/Time    TROPONINT 37 (H) 07/01/2025 0429    TROPONINT 34 (H) 07/01/2025 0100     Results from last 7 days   Lab Units 07/01/25  0429   TSH uIU/mL 0.630           Chest x-ray 7/1/2025:  IMPRESSION:  Impression:  Interstitial disease in the lungs is favored to reflect chronic interstitial disease although atypical pneumonia and interstitial pulmonary edema could have a similar appearance. Stability is not assessed without comparison. There is also a small right-sided pleural effusion.      Tele: Normal sinus rhythm with PACs    EKG: Normal sinus rhythm      Assessment & Plan     Acute on chronic diastolic heart failure  proBNP 1,543 (6/29/25) outside facility  Echo 1/2025 (outside facility): Moderate aortic stenosis  Echo 6/30/2025 (outside facility): Severe aortic valve stenosis, normal EF  High-sensitivity troponin 34, 37 on 7/1/2025, likely type II mechanism secondary to CHF.  Valvular heart disease   Echo 1/2025 (outside facility): Moderate aortic stenosis  Echo 6/30/2025 (outside facility): Severe aortic valve stenosis, normal EF  Acute on chronic renal failure  Creatinine 2.89  Baseline creatinine 2.1  Hypertension  On carvedilol, nifedipine  Outpatient on carvedilol, diltiazem,  lisinopril, nifedipine, torsemide  Hyperlipidemia  On pravastatin  Type 2 diabetes mellitus  A1c 7.33  Chronic anemia      Plan:    Troponins are elevated, likely type II mechanism secondary to CHF.  Defer ischemic evaluation today due to renal insufficiency.  Recommend workup evaluation for TAVR, will consult CT surgery to assist with workup.  Appreciate their help.  Images are requested from echo performed at [facility] [place] for review.   Carotid ultrasound order placed, pending result  Patient needs left heart cath and CTA chest per TAVR protocol workup.  Will monitor renal function and will determine timing of each test.  Switch carvedilol to metoprolol for better heart rate control.  Titrate up as able.  Avoid nephrotoxic medications  Patient needs dental evaluation prior to TAVR.  This can be done as outpatient.  Cardiology will continue to follow along.         HARRISON Rodriguez scribed for Dr. Carrillo    Await outside echocardiogram.  This will be reviewed and further recommendations will be made.    I Roseann Carrillo MD personally performed the services described in this documentation as scribed by the above individual in my presence, and it is both accurate and complete.    Roseann Carrillo MD, FACC        Dictated utilizing Dragon dictation

## 2025-07-01 NOTE — PROGRESS NOTES
[facility] Medicine Services  ADMISSION FOLLOW-UP NOTE          Patient admitted after midnight, H&P by my partner performed earlier on today's date reviewed.  Interim findings, labs, and charting also reviewed.        The [facility] Hospital Problem List has been managed and updated to include any new diagnoses:  Active Hospital Problems    Diagnosis  POA    **Heart failure [I50.9]  Yes    Anemia [D64.9]  Unknown    CHF (congestive heart failure) [I50.9]  Unknown    Chronic kidney disease [N18.9]  Unknown    COPD (chronic obstructive pulmonary disease) [J44.9]  Unknown    Diabetes mellitus [E11.9]  Unknown    Dyslipidemia [E78.5]  Unknown    Hypertension [I10]  Unknown      Resolved Hospital Problems   No resolved problems to display.         ADDITIONAL PLAN:  - detailed assessment and plan from admission reviewed  - Baldemar Akers is a 75-year-old male with a past medical history of COPD, type 2 diabetes, hypertension, CKD, former tobacco use disorder, chronic anemia, and dyslipidemia. He was transferred from [facility] [place] for evaluation of a TAVR.  CT surgery and cardiology follow-up       Acute respiratory failure with hypoxia  Acute on chronic diastolic heart failure.  Hypertension  - Currently on high flow, and comfortable  - strict I&O's  - daily weights  - Repeat echo here and carotid duplex.  CT surgery and cardiology follow     Moderate aortic stenosis.  - Pulmonary at OSH recommended that the patient be transferred to a tertiary care center for evaluation of his aortic valve  - Cardiology following     COPD  Possible pneumonia  - continue DuoNebs and Pulmicort  - treated with steroids at OSH.  Steroids were DCd on 6/30  - symbicort  - continue cefepime for now  - BC x 2  - sputum culture if able       Acute on chronic renal failure, stage 3b.  - Baseline creatinine is 2.1  - Avoid nephrotoxic medications  - Monitor I's and O's and daily weights  - holding  lisinopril, torsemide     Hyponatremia.-- resolved  - Sodium is 127 at OSH  - was given samsca at OSH  - Na 138 on arrival here     Type 2 diabetes.  - FSBG with SSI  - Holding glimepiride  --A1c is 7.3     Chronic anemia.  - Hgn 9.8, Hct 32.7  - no overt signs of bleeding  - continue to monitor       Expected Discharge   Expected Discharge Date: 7/4/2025; Expected Discharge Time:      Veronica Ramirez MD  07/01/25

## 2025-07-01 NOTE — CONSULTS
[facility] Cardiothoracic Surgery In-Patient Consult    Name:  Baldemar Anderson  MRN Number:  [id number]  Date of Admission:  6/30/2025  Date of Consultation: 7/1/2025    Consulting Provider:    PCP: Aparna Mendoza MD  IP Care Team:  Patient Care Team:  Aparna Mendoza MD as PCP - General (Family Medicine)    Reason for Consultation: Severe aortic stenosis    History of Present Illness:    Baldemar Anderson is a 75 y.o. male with a history of hypertension, hyperlipidemia, CHF, type 2 diabetes-A1c 7.3, stage 3b CKD, anemia, COPD, and former smoker.  Patient presented to our facility on 6/30 as a transfer from [facility] [place] for TAVR evaluation.  He initially presented to [facility] [place] for CHF exacerbation and was being treated for pneumonia.  WBCs on admission were 21,000 and proBNP was 4325. He denies any history of rheumatic fever.  States this has been his first hospital admission for CHF exacerbation.  He endorses exertional dyspnea, bilateral lower extremity edema, lightheadedness, and syncope. Was recently seen by his dentist 1 year ago.  Our service has been consulted for consideration of TAVR.    Review of Systems:  Review of Systems   Constitutional:  Positive for fatigue.   Eyes: Negative.    Respiratory:  Positive for shortness of breath.    Cardiovascular:  Positive for leg swelling.   Gastrointestinal: Negative.    Endocrine: Negative.    Genitourinary: Negative.    Musculoskeletal: Negative.    Skin: Negative.    Allergic/Immunologic: Negative.    Neurological:  Positive for syncope and light-headedness.   Hematological: Negative.    Psychiatric/Behavioral: Negative.         Hospital Problems:   Heart failure    Anemia    CHF (congestive heart failure)    Chronic kidney disease    COPD (chronic obstructive pulmonary disease)    Diabetes mellitus    Dyslipidemia    Hypertension       Past Medical History:    Past Medical History:   Diagnosis Date    Anemia     CHF (congestive heart  failure)     Chronic kidney disease     COPD (chronic obstructive pulmonary disease)     Diabetes mellitus     Dyslipidemia     Hypertension        Past Surgical History:    Past Surgical History:   Procedure Laterality Date    BRONCHOSCOPY      CHOLECYSTECTOMY         Family History:    Family History   Problem Relation Age of Onset    Heart failure Mother     Pneumonia Father        Social History:    Social History     Socioeconomic History    Marital status:    Tobacco Use    Smoking status: Former     Types: Cigarettes    Smokeless tobacco: Never   Vaping Use    Vaping status: Never Used   Substance and Sexual Activity    Alcohol use: Never    Drug use: Never    Sexual activity: Defer       Allergies:  Allergies   Allergen Reactions    Sulfa Antibiotics Anaphylaxis         Physical Exam:  Vital Signs:    Temp:  [97.7 °F (36.5 °C)-98 °F (36.7 °C)] 97.7 °F (36.5 °C)  Heart Rate:  [86-97] 94  Resp:  [18-20] 18  BP: (162-187)/(68-88) 162/68  Body mass index is 25.52 kg/m².     Physical Exam  Vitals reviewed.   Constitutional:       Appearance: Normal appearance.   HENT:      Head: Normocephalic.      Mouth/Throat:      Pharynx: Oropharynx is clear.   Eyes:      Conjunctiva/sclera: Conjunctivae normal.   Cardiovascular:      Rate and Rhythm: Normal rate and regular rhythm.      Heart sounds: Murmur heard.      Systolic murmur is present with a grade of 3/6.   Pulmonary:      Breath sounds: Examination of the right-lower field reveals rhonchi. Examination of the left-lower field reveals rhonchi. Rhonchi present.   Abdominal:      General: Bowel sounds are normal.   Musculoskeletal:         General: Normal range of motion.      Cervical back: Neck supple.   Skin:     General: Skin is warm.   Neurological:      General: No focal deficit present.      Mental Status: He is alert and oriented to person, place, and time.   Psychiatric:         Mood and Affect: Mood normal.         Behavior: Behavior normal.          Labs/Imaging/Procedures:   Results from last 7 days   Lab Units 25  0100   SODIUM mmol/L 138   POTASSIUM mmol/L 4.6   CHLORIDE mmol/L 105   CO2 mmol/L 18.5*   BUN mg/dL 75.5*   CREATININE mg/dL 2.89*   CALCIUM mg/dL 8.6   BILIRUBIN mg/dL <0.2   ALK PHOS U/L 70   ALT (SGPT) U/L 37   AST (SGOT) U/L 21   GLUCOSE mg/dL 320*     Results from last 7 days   Lab Units 25  0429 25  0100   HSTROP T ng/L 37* 34*     Results from last 7 days   Lab Units 25  0100   WBC 10*3/mm3 21.10*   HEMOGLOBIN g/dL 9.8*   HEMATOCRIT % 32.7*   PLATELETS 10*3/mm3 406     Results from last 7 days   Lab Units 25  0429 25  1450   INR  1.16* 1.06   APTT seconds  --  30.6     Results from last 7 days   Lab Units 25  0429   MAGNESIUM mg/dL 2.3         XR Chest 1 View  Result Date: 2025  Impression: Interstitial disease in the lungs is favored to reflect chronic interstitial disease although atypical pneumonia and interstitial pulmonary edema could have a similar appearance. Stability is not assessed without comparison. There is also a small right-sided pleural effusion. Electronically Signed: Mahesh Novoa MD  2025 2:52 AM EDT  Workstation ID: [id number]    US renal complete bilateral  Result Date: 2025  Unremarkable renal ultrasound.  : 1950 Images reviewed, interpreted, and dictated by Sonam Nicole MD    XR Chest 1 View  Result Date: 2025  There has been interval improvement . Continued follow up recommended. Images reviewed, interpreted, and dictated by Dr. JOSELITO Ortiz. Transcribed by Jared Taylor PA-C    XR Chest 1 View  Result Date: 2025  Interval worsening as above. Continued follow up recommended. Images reviewed, interpreted, and dictated by Dr. JOSELITO Ortiz. Transcribed by Jared Tayolr PA-C    XR Chest 1 View  Result Date: 2025  There has been no significant interval change .  Continued follow-up recommended . Images reviewed, interpreted, and  dictated by Dr. JOSELITO Ortiz. Transcribed by MOISES Lopez(R).    XR Chest 1 View  Result Date: 6/21/2025  Improved presumed edema with underlying chronic changes. Images reviewed, interpreted, and dictated by Dr. Ferdinand Leggett. Transcribed by Sameera Falk PA-C.       Assessment:    Heart failure    Anemia    CHF (congestive heart failure)    Chronic kidney disease    COPD (chronic obstructive pulmonary disease)    Diabetes mellitus    Dyslipidemia    Hypertension      Plan:  Unable to review echocardiogram done at [facility] [place].  Will repeat echo here.  Awaiting carotid duplex.  Continue treatment for possible pneumonia.        Taylor Hutchison, APRN  12:13 EDT  07/01/25     Thank you for allowing us to participate in the care of your patient. Please do not hesitate to contact us with additional questions or concerns.

## 2025-07-01 NOTE — PROGRESS NOTES
Pharmacy to dose Cefepime  Pneumonia   Est CrCl = 27 mL/min    Results from last 7 days   Lab Units 07/01/25  0100   CREATININE mg/dL 2.89*        Plan: Cefepime 2 Gm IV Q24H x 5 days; pharmacist to follow  Last dose at OSH 1 GM (09:12)    Baldemar Murry [facility]  7/1/25 03:00

## 2025-07-01 NOTE — PLAN OF CARE
Problem: Adult Inpatient Plan of Care  Goal: Plan of Care Review  Outcome: Progressing  Goal: Patient-Specific Goal (Individualized)  Outcome: Progressing  Goal: Absence of Hospital-Acquired Illness or Injury  Outcome: Progressing  Intervention: Identify and Manage Fall Risk  Recent Flowsheet Documentation  Taken 7/1/2025 0400 by Adam Garcia RN  Safety Promotion/Fall Prevention:   clutter free environment maintained   mobility aid in reach   nonskid shoes/slippers when out of bed   room organization consistent   safety round/check completed  Taken 7/1/2025 0200 by Adam Garcia RN  Safety Promotion/Fall Prevention:   clutter free environment maintained   mobility aid in reach   nonskid shoes/slippers when out of bed   room organization consistent   safety round/check completed  Taken 7/1/2025 0000 by Adam Garcia RN  Safety Promotion/Fall Prevention:   clutter free environment maintained   mobility aid in reach   nonskid shoes/slippers when out of bed   room organization consistent   safety round/check completed  Taken 6/30/2025 2200 by Adam Garcia RN  Safety Promotion/Fall Prevention:   clutter free environment maintained   mobility aid in reach   nonskid shoes/slippers when out of bed   room organization consistent   safety round/check completed  Taken 6/30/2025 2119 by Adam Garcia RN  Safety Promotion/Fall Prevention:   clutter free environment maintained   mobility aid in reach   nonskid shoes/slippers when out of bed   room organization consistent   safety round/check completed  Intervention: Prevent Skin Injury  Recent Flowsheet Documentation  Taken 7/1/2025 0400 by Adam Garcia RN  Body Position:   turned   supine   lower extremity elevated  Skin Protection:   hydrocolloids used   incontinence pads utilized   pulse oximeter probe site changed   silicone foam dressing in place   skin sealant/moisture barrier applied  Taken 7/1/2025 0200 by Adam Garcia RN  Body Position:   turned   left   lower  extremity elevated  Skin Protection:   hydrocolloids used   incontinence pads utilized   pulse oximeter probe site changed   silicone foam dressing in place   skin sealant/moisture barrier applied  Taken 7/1/2025 0000 by Adam Garcia RN  Body Position:   turned   right   lower extremity elevated  Skin Protection:   hydrocolloids used   incontinence pads utilized   pulse oximeter probe site changed   silicone foam dressing in place   skin sealant/moisture barrier applied  Taken 6/30/2025 2200 by Adam Garcia RN  Body Position:   turned   lower extremity elevated   supine  Skin Protection:   hydrocolloids used   incontinence pads utilized   pulse oximeter probe site changed   silicone foam dressing in place   skin sealant/moisture barrier applied  Taken 6/30/2025 2119 by Adam Garcia RN  Body Position:   turned   left   lower extremity elevated  Skin Protection:   hydrocolloids used   incontinence pads utilized   pulse oximeter probe site changed   silicone foam dressing in place   skin sealant/moisture barrier applied  Intervention: Prevent and Manage VTE (Venous Thromboembolism) Risk  Recent Flowsheet Documentation  Taken 6/30/2025 2119 by Adam Garcia RN  VTE Prevention/Management:   SCDs (sequential compression devices) off   patient refused intervention  Intervention: Prevent Infection  Recent Flowsheet Documentation  Taken 7/1/2025 0400 by Adam Garcia RN  Infection Prevention:   hand hygiene promoted   single patient room provided  Taken 7/1/2025 0200 by Adam Garcia RN  Infection Prevention:   hand hygiene promoted   single patient room provided  Taken 7/1/2025 0000 by Adam Garcia RN  Infection Prevention:   hand hygiene promoted   single patient room provided  Taken 6/30/2025 2200 by Adam Garcia RN  Infection Prevention:   hand hygiene promoted   single patient room provided  Taken 6/30/2025 2119 by Adam Garcia RN  Infection Prevention:   hand hygiene promoted   single patient room provided  Goal:  Optimal Comfort and Wellbeing  Outcome: Progressing  Intervention: Provide Person-Centered Care  Recent Flowsheet Documentation  Taken 7/1/2025 0400 by Adam Garcia RN  Trust Relationship/Rapport: care explained  Taken 7/1/2025 0200 by Adam Garcia RN  Trust Relationship/Rapport: care explained  Taken 7/1/2025 0000 by Adam Garcia RN  Trust Relationship/Rapport: care explained  Taken 6/30/2025 2200 by Adam Garcia RN  Trust Relationship/Rapport: care explained  Taken 6/30/2025 2119 by Adam Garcia RN  Trust Relationship/Rapport:   care explained   questions answered   questions encouraged  Goal: Readiness for Transition of Care  Outcome: Progressing  Intervention: Mutually Develop Transition Plan  Recent Flowsheet Documentation  Taken 6/30/2025 2200 by Adam Garcia RN  Transportation Anticipated: other (see comments)  Patient/Family Anticipated Services at Transition: none  Patient/Family Anticipates Transition to: home with family  Taken 6/30/2025 2136 by Adam Garcia RN  Equipment Currently Used at Home: (walking sticks) other (see comments)     Problem: Comorbidity Management  Goal: Maintenance of COPD Symptom Control  Outcome: Progressing  Intervention: Maintain COPD (Chronic Obstructive Pulmonary Disease) Symptom Control  Recent Flowsheet Documentation  Taken 6/30/2025 2119 by Adam Garcia RN  Medication Review/Management: medications reviewed  Goal: Maintenance of Heart Failure Symptom Control  Outcome: Progressing  Intervention: Maintain Heart Failure Management  Recent Flowsheet Documentation  Taken 6/30/2025 2119 by Adam Garcia RN  Medication Review/Management: medications reviewed  Goal: Blood Pressure in Desired Range  Outcome: Progressing  Intervention: Maintain Blood Pressure Management  Recent Flowsheet Documentation  Taken 6/30/2025 2119 by Adam Garcia RN  Medication Review/Management: medications reviewed     Problem: Skin Injury Risk Increased  Goal: Skin Health and Integrity  Outcome:  Progressing  Intervention: Optimize Skin Protection  Recent Flowsheet Documentation  Taken 7/1/2025 0400 by Adam Garcia RN  Activity Management: activity encouraged  Pressure Reduction Techniques:   heels elevated off bed   pressure points protected   weight shift assistance provided  Head of Bed ([facility]) Positioning: [facility] elevated  Pressure Reduction Devices:   heel offloading device utilized   foam padding utilized   positioning supports utilized   pressure-redistributing mattress utilized  Skin Protection:   hydrocolloids used   incontinence pads utilized   pulse oximeter probe site changed   silicone foam dressing in place   skin sealant/moisture barrier applied  Taken 7/1/2025 0200 by Adam Garcia RN  Activity Management: activity encouraged  Pressure Reduction Techniques:   heels elevated off bed   pressure points protected   weight shift assistance provided  Head of Bed ([facility]) Positioning: [facility] elevated  Pressure Reduction Devices:   heel offloading device utilized   foam padding utilized   positioning supports utilized   pressure-redistributing mattress utilized  Skin Protection:   hydrocolloids used   incontinence pads utilized   pulse oximeter probe site changed   silicone foam dressing in place   skin sealant/moisture barrier applied  Taken 7/1/2025 0000 by Adam Garcia RN  Activity Management: activity encouraged  Pressure Reduction Techniques:   heels elevated off bed   pressure points protected   weight shift assistance provided  Head of Bed ([facility]) Positioning: [facility] elevated  Pressure Reduction Devices:   heel offloading device utilized   foam padding utilized   positioning supports utilized   pressure-redistributing mattress utilized  Skin Protection:   hydrocolloids used   incontinence pads utilized   pulse oximeter probe site changed   silicone foam dressing in place   skin sealant/moisture barrier applied  Taken 6/30/2025 2200 by Adam Garcia RN  Activity Management: activity encouraged  Pressure  Reduction Techniques:   heels elevated off bed   pressure points protected   weight shift assistance provided  Head of Bed ([facility]) Positioning: [facility] elevated  Pressure Reduction Devices:   heel offloading device utilized   foam padding utilized   positioning supports utilized   pressure-redistributing mattress utilized  Skin Protection:   hydrocolloids used   incontinence pads utilized   pulse oximeter probe site changed   silicone foam dressing in place   skin sealant/moisture barrier applied  Taken 6/30/2025 2119 by Adam Garcia, RN  Activity Management: activity encouraged  Pressure Reduction Techniques:   heels elevated off bed   pressure points protected   weight shift assistance provided  Head of Bed ([facility]) Positioning: [facility] elevated  Pressure Reduction Devices:   heel offloading device utilized   foam padding utilized   positioning supports utilized   pressure-redistributing mattress utilized  Skin Protection:   hydrocolloids used   incontinence pads utilized   pulse oximeter probe site changed   silicone foam dressing in place   skin sealant/moisture barrier applied   Goal Outcome Evaluation:

## 2025-07-01 NOTE — CASE MANAGEMENT/SOCIAL WORK
Discharge Planning Assessment  [facility]     Patient Name: Baldemar Anderson  MRN: [id number]  Today's Date: 7/1/2025    Admit Date: 6/30/2025    Plan: IDP, d/c goal is to return home w/family   Discharge Needs Assessment       Row Name 07/01/25 1444       Living Environment    People in Home spouse    Current Living Arrangements home    Potentially Unsafe Housing Conditions none    In the past 12 months has the electric, gas, oil, or water company threatened to shut off services in your home? No    Primary Care Provided by self;spouse/significant other    Provides Primary Care For no one    Family Caregiver if Needed spouse    Family Caregiver Names reynold spouse @ bedside    Quality of Family Relationships helpful;involved;supportive    Living Arrangement Comments Lives in [facility], [place] w/spouse in a home [place] w/basement, he does not go into basement. 2 steps inside going from living room to dining room,  some steps outside to enter, but if comes thru family room, no steps       Resource/Environmental Concerns    Resource/Environmental Concerns none       Transportation Needs    In the past 12 months, has lack of transportation kept you from medical appointments or from getting medications? no    In the past 12 months, has lack of transportation kept you from meetings, work, or from getting things needed for daily living? No       Food Insecurity    Within the past 12 months, you worried that your food would run out before you got the money to buy more. Never true    Within the past 12 months, the food you bought just didn't last and you didn't have money to get more. Never true       Transition Planning    Patient/Family Anticipates Transition to home with family    Patient/Family Anticipated Services at Transition none    Transportation Anticipated family or friend will provide       Discharge Needs Assessment    Equipment Currently Used at Home rollator;oxygen    Do you want help finding or keeping  work or a job? I do not need or want help    Do you want help with school or training? For example, starting or completing job training or getting a high school diploma, GED or equivalent No                   Discharge Plan       Row Name 07/01/25 1447       Plan    Plan IDP, d/c goal is to return home w/family    Patient/Family in Agreement with Plan yes    Plan Comments I met w/Mr. Anderson and his spouse Jo-Ann in room to obtain IDP and initiate d/c planning.  IDP obtained via spouse, he was having an echo.  Insurance of medicare A/B w/[employer] as secondary confirmed. Fills scripts @ [facility] in [place], [place] w/no issues obtaining medications other than sometimes they are slow to get and slow to speak w/pharmacists. He uses rollator @ baseline and also uses home O2 continuous @ [employer] thru [facility]. Denies HH, had in past but not current, he is currently doing outpatient rehab (pulmonary) @ [facility] in [place], [place]. Denies any inpatient rehab admissions. PCP Aparna Mendoza. CT consulted, he is here for TAVR w/u. DC goal is to return home w/family, may or may not need transportation @ d/c. CM will continue to follow.    Final Discharge Disposition Code 01 - home or self-care                  Continued Care and Services - Admitted Since 6/30/2025    No active coordination exists.       Expected Discharge Date and Time       Expected Discharge Date Expected Discharge Time    Jul 4, 2025            Demographic Summary       Row Name 07/01/25 1443       General Information    Admission Type inpatient    Arrived From hospital    Referral Source emergency department    Preferred Language English    General Information Comments PCP  Aparna Mendoza No POA/LW paperwork on file.                   Functional Status       Row Name 07/01/25 1443       Functional Status    Usual Activity Tolerance moderate    Current Activity Tolerance fair       Physical Activity    On average, how many days per week do you engage in  moderate to strenuous exercise (like a brisk walk)? 0 days    On average, how many minutes do you engage in exercise at this level? 0 min    Number of minutes of exercise per week 0       Functional Status, IADL    Medications independent    Meal Preparation assistive person    Housekeeping completely dependent    Laundry completely dependent    Shopping completely dependent    If for any reason you need help with day-to-day activities such as bathing, preparing meals, shopping, managing finances, etc., do you get the help you need? I get all the help I need       Employment/    Employment Status retired                   Psychosocial    No documentation.                  Abuse/Neglect    No documentation.                  Legal    No documentation.                  Substance Abuse    No documentation.                  Patient Forms    No documentation.                     Sergio Parnell RN

## 2025-07-01 NOTE — H&P
[facility] Medicine Services  HISTORY AND PHYSICAL    Patient Name: Baldemar Anderson  : 1950  MRN: [id number]  Primary Care Physician: Aparna Mendoza MD  Date of admission: 2025    Subjective   Subjective     Chief Complaint:  Shortness of air    History of Present Illness  Reason for Admit: Evaluation of TAVR.    Baldemar Akers is a 75-year-old male with a past medical history of COPD, type 2 diabetes, hypertension, CKD, former tobacco use disorder, chronic anemia, and dyslipidemia. He was transferred from [facility] for evaluation of a TAVR.    He presented to an outside hospital on Friday night with complaints of sinus drainage and sore throat for approximately 1 week. He has had a productive cough with brown sputum, shortness of air, and wheezing. He wears 4 L of oxygen at home and his oxygen saturations were dropping into the 70s, which prompted him to go to the ED. On admission, he was placed on BiPAP. He had a negative respiratory panel, but his BUN and creatinine were elevated with a creatinine of 3.1. He was evaluated by pulmonology today, who felt that his respiratory distress was due to decompensated CHF superimposed on COPD. They felt that his chest x-ray abnormality was due to CHF and it was doubtful that he had underlying pneumonia. They discontinued his doxycycline but decided to continue him on cefepime. They also discontinued his IV steroids. It was recommended that he be transferred to a tertiary care center for evaluation of his aortic valve.  Patient reports that he continues to have shortness of air and a cough.  No fevers, chest pain, nausea, vomiting, diarrhea, abdominal pain, edema, or any other complaints at this time.           Review of Systems   Constitutional:  Negative for chills and fever.   HENT:  Positive for postnasal drip and sore throat.    Eyes: Negative.    Respiratory:  Positive for cough, shortness of breath and  wheezing.    Cardiovascular: Negative.    Gastrointestinal: Negative.    Endocrine: Negative.    Genitourinary: Negative.    Musculoskeletal: Negative.    Skin: Negative.    Allergic/Immunologic: Negative.    Neurological: Negative.    Hematological: Negative.    Psychiatric/Behavioral: Negative.                  Personal History     Past Medical History:   Diagnosis Date    Anemia     CHF (congestive heart failure)     Chronic kidney disease     COPD (chronic obstructive pulmonary disease)     Diabetes mellitus     Dyslipidemia     Hypertension              Past Surgical History:   Procedure Laterality Date    BRONCHOSCOPY      CHOLECYSTECTOMY         Family History:  family history includes Heart failure in his mother; Pneumonia in his father.     Social History:  reports that he has quit smoking. His smoking use included cigarettes. He has never used smokeless tobacco. He reports that he does not drink alcohol and does not use drugs.  Social History     Social History Narrative    Not on file       Medications:  NIFEdipine XL, albuterol, aspirin, budesonide-formoterol, carvedilol, dilTIAZem, esomeprazole, ferrous sulfate, glimepiride, lisinopril, pravastatin, and torsemide    Not on File    Objective   Objective     Vital Signs:   Temp:  [97.8 °F (36.6 °C)-98 °F (36.7 °C)] 98 °F (36.7 °C)  Heart Rate:  [86-97] 97  Resp:  [20] 20  BP: (170-187)/(68-81) 170/68  Flow (L/min) (Oxygen Therapy):  [60] 60    Physical Exam   Constitutional: Awake, alert, resting in bed  Eyes: PERRLA, sclerae anicteric, no conjunctival injection  HENT: NCAT, mucous membranes moist  Neck: Supple, no thyromegaly, no lymphadenopathy, trachea midline  Respiratory: wheezing RUL, diminished in the bases bilaterally, nonlabored respirations   Cardiovascular: RRR, + murmur, palpable pedal pulses bilaterally  Gastrointestinal: Positive bowel sounds, soft, nontender, nondistended  Musculoskeletal: No bilateral ankle edema, no clubbing or cyanosis  to extremities  Psychiatric: Appropriate affect, cooperative  Neurologic: Oriented x 3, strength symmetric in all extremities, Cranial Nerves grossly intact to confrontation, speech clear  Skin: No rashes           Result Review:  I have personally reviewed the results from the time of this admission to 2025 02:43 EDT and agree with these findings:  [x]  Laboratory list / accordion  []  Microbiology  [x]  Radiology  [x]  EKG/Telemetry   []  Cardiology/Vascular   []  Pathology  []  Old records  []  Other:  Most notable findings include:          LAB RESULTS:      Lab 25  0100 25  1007 25  1450   WBC 21.10*  --   --    HEMOGLOBIN 9.8*  --   --    HEMATOCRIT 32.7*  --   --    PLATELETS 406  --   --    NEUTROS ABS 19.57*  --   --    IMMATURE GRANS (ABS) 0.34*  --   --    LYMPHS ABS 0.61*  --   --    MONOS ABS 0.54  --   --    EOS ABS 0.00  --   --    MCV 85.6  --   --    PROCALCITONIN  --  0.72*  --    PROTIME  --   --  10.8   APTT  --   --  30.6         Lab 25  0100   SODIUM 138   POTASSIUM 4.6   CHLORIDE 105   CO2 18.5*   ANION GAP 14.5   BUN 75.5*   CREATININE 2.89*   EGFR 22.1*   GLUCOSE 320*   CALCIUM 8.6   MAGNESIUM 2.2         Lab 25  0100   TOTAL PROTEIN 6.7   ALBUMIN 2.8*   GLOBULIN 3.9   ALT (SGPT) 37   AST (SGOT) 21   BILIRUBIN <0.2   ALK PHOS 70         Lab 25  0100 25  1450   HSTROP T 34*  --    PROTIME  --  10.8   INR  --  1.06                 Brief Urine Lab Results       None          Microbiology Results (last 10 days)       ** No results found for the last 240 hours. **            ECHO COMPLETE (DOPPLER / COLOR) W OR WO CONTRAST  Result Date: 2025  TRANSTHORACIC ECHOCARDIOGRAPHY REPORT  Demographics  Patient Name:            ERMA THOMPSON   :     1950  Medical Record Number:   [id number]                Age:     75 year(s)  Corporate ID Number:     [id number]                Gender   Male  Account Number:          [id number]   Sonographer:             Ger TREJO           Height:  71 inches  Referring Physician:                               Weight:  179 pounds  Interpreting Physician:  AILYN MANCINI MD         BMI:     24.97 kg/m^2  Date of Service:         06/30/2025  Room Number:             4004 Type of Study:  TTE procedure: ECHO COMPLETE (DOPPLER / COLOR) W OR WO CONTRAST. Measurements Summary:  LVEDd: 5.02 cm        LVESd: 3.63 cm           IVSEd: 1.12 cm  AO Root:2.9 cm        LVPWd: 1.24 cm  Left Ventricle  Peak E-wave: 1.13 m/s Peak A-wave: 1.21 m/s  E/A ratio: 0.93  E' Velocity: 0.06 m/s Volume [id number] ml  Volume wzzclfoo53.8 ml  LVOT diameter: 2 cm  Normal sized left ventricle.  Concentric LVH: mild to moderate  Normal EF  Right Ventricle  Diastolic dimension: 3.07 cm  Normal sized right ventricle and function  Left Atrium  LA dimension: 3.8 cm                 LA/Aorta: 1.31  Left atrial enlargement mild  Right Atrium  Normal sized right atrium.  Mitral Valve  Deceleration time: 150 msec           Area PHT: 5 cm^2  P1/2t: 44 msec  MAC  Trivial to mild mitral regurgitration.  Difficult to exclude mild COLT, not definitive by any means  No mitral stenosis.  Aortic Valve  AV VTI: 69.1 cm     Area continuity: 1.34 Peak velocity: 3.03  LVOT VTI: 29.5 cm   cm^2                  m/s  Cusp separation:    Mean velocity: 2.13   Peak gradient: 36.72  0.8 cm              m/s                   mmHg  Mean gradient: 22  mmHg   Severe aortic valve stenosis  Mild aortic regurgitation  Sclerotic AV  Tricuspid Valve  Structurally normal tricuspid valve.  Trivial to mild tricuspid regurgitation  Pulmonic Valve  Grossly normal. Great Vessels Aorta  Aortic Root: 2.9 cm  LVOT Diameter: 2 cm Ao root normal size.  Pericardium / Pleura Trivial pericardial effusion  Other  Agitated saline study demionstrates no right to left shunt  ----------------------------------------------------------------  Electronically signed by AILYN MANCINI  MD(Interpreting  Physician) on 2025 18:01  ----------------------------------------------------------------    US renal complete bilateral  Result Date: 2025  RENAL ULTRASOUND HISTORY: Acute kidney injury. COMPARISON: None. PROCEDURE: Ultrasound images of the kidneys were obtained. FINDINGS:  Limited images of the liver parenchyma demonstrate normal echogenicity.   The right kidney measures 10.1 cm in length. It is normal echogenicity. There is no hydronephrosis. The left kidney measures 10.1 cm in length. It is normal echogenicity. There is no hydronephrosis.     Impression: Unremarkable renal ultrasound.  : 1950 Images reviewed, interpreted, and dictated by Sonam Nicole MD    XR Chest 1 View  Result Date: 2025  PORTABLE CHEST      2025 5:40 AM HISTORY: Acute shortness of breath. COMPARISON: 2025. FINDINGS: The heart is stable in size.  There has been interval improvement  in the diffuse interstitial opacities throughout the lungs. There are persistent small bilateral pleural effusions. There is no pneumothorax.    Impression: There has been interval improvement . Continued follow up recommended. Images reviewed, interpreted, and dictated by Dr. JOSELITO Ortiz. Transcribed by Jared Taylor PA-C    XR Chest 1 View  Result Date: 2025  PORTABLE CHEST     2025 10:52 AM HISTORY: Acute shortness of breath. COMPARISON: 2025. FINDINGS: The heart is stable in size. There has been interval worsening in the perihilar and bibasilar opacities. There are small bilateral pleural effusions. There is no pneumothorax.    Impression: Interval worsening as above. Continued follow up recommended. Images reviewed, interpreted, and dictated by Dr. JOSELITO Ortiz. Transcribed by Jared Taylor PA-C          Assessment & Plan   Assessment & Plan       Anemia    CHF (congestive heart failure)    Chronic kidney disease    COPD (chronic obstructive pulmonary disease)    Diabetes  mellitus    Dyslipidemia    Hypertension    Baldemar Akers is a 75-year-old male with a past medical history of COPD, type 2 diabetes, hypertension, CKD, former tobacco use disorder, chronic anemia, and dyslipidemia. He was transferred from [facility] for evaluation of a TAVR.   Assessment & Plan    Acute respiratory failure with hypoxia  Acute on chronic diastolic heart failure.  Hypertension  - Given Samsca today and Bumex was discontinued  - Evaluated by pulmonology who did not feel that the patient's issues were related to pneumonia or a COPD exacerbation.  Heart failure likely exacerbated by the aortic stenosis  - Pulmonology felt that the patient's valve needed to be looked at in a tertiary center  - Home O2 at 4 L--now on 55% high flow  - strict I&O's  - daily weights  - consult Dr. Loredo with cardiology  - Continue Coreg and procardia  - labs    Moderate aortic stenosis.  - Pulmonary at OSH recommended that the patient be transferred to a tertiary care center for evaluation of his aortic valve  - consult Dr. Loredo with cardiology in the am    COPD  Possible pneumonia  - continue DuoNebs and Pulmicort  - treated with steroids at OSH.  Steroids were DCd on 6/30  - chest xray  - symbicort  - continue cefepime for now  - BC x 2  - sputum culture  - labs    Acute on chronic renal failure, stage 3b.  - Baseline creatinine is 2.1  - Creatinine earlier today was 3.1--now 2.89  - Avoid nephrotoxic medications  - Monitor I's and O's and daily weights  - hold lisinopril, torsemide    Hyponatremia.-- resolved  - Sodium is 127 at OSH  - was given samsca at OSH  - Na 138    Type 2 diabetes.  - FSBG with SSI  - Hold glimepiride    Chronic anemia.  - Hgn 9.8, Hct 32.7  - no overt signs of bleeding  - continue to monitor    DVT prophylaxis:  Heparin    CODE STATUS:    Code Status (Patient has no pulse and is not breathing): CPR (Attempt to Resuscitate)  Medical Interventions (Patient has pulse or is  breathing): Full Support  Level Of Support Discussed With: Patient      Expected Discharge  TBD  Expected discharge date/ time has not been documented.      This note has been completed as part of a split-shared workflow.     Signature: Electronically signed by HARRISON Alan, 07/01/25, 2:46 AM EDT.       Patient or patient representative verbalized consent for the use of Ambient Listening during the visit for chart documentation.    -------------------------------      Attending   Admission Attestation       I have performed an independent face-to-face diagnostic evaluation including performing an independent physical examination.  I approve of the documented plan of care above that was reviewed and developed with the advanced practice clinician (APC) and take responsibility for that plan along with its associated risks.  I have updated the HPI as appropriate.    Brief HPI    75M was transferred here from [facility] earlier tonight (Monday 6/30) for TAVR evaluation, accepted by the cardiology service.  On Friday night (3 days ago) the patient went to an ER for 1 week of a sore throat and increased sinus congestion and drainage.  Soon after that started, he began to notice increased frequency of cough with some production of brown sputum but no tutu blood.  He became short of breath soon after that and developed sensation of wheezing.  He states that he was on his 4 L of O2 by nasal cannula which he uses at home at all times, he had saturations as low as the 70s on home monitor.  After he was admitted to the outside hospital, he was placed on BiPAP.  He eventually had an increase in his serum creatinine, and physicians at that hospital felt that he had developed CHF even with his COPD.  He did improve somewhat and after a few days of IV steroids these were discontinued.  They felt that he had not recovered enough to be discharged home or to rehab, so they called our facility for transfer and  "evaluation for possible TAVR with an echo there showed severe aortic valve stenosis, mild aortic regurg and an overall sclerotic aortic valve.  During my visit, he states he feels better than he felt earlier in the day, and that his breathing is \"a lot easier than it was,\" but he is still requiring O2 by high flow cannula to maintain satisfactory saturations.    Attending Physical Exam:  Temp:  [97.8 °F (36.6 °C)-98 °F (36.7 °C)] 98 °F (36.7 °C)  Heart Rate:  [86-97] 97  Resp:  [20] 20  BP: (170-187)/(68-81) 170/68  Flow (L/min) (Oxygen Therapy):  [60] 60    Constitutional: Awake, alert, NAD, pleasant.  Eyes: PERRLA, sclerae anicteric, no conjunctival injection  HENT: NCAT, mucous membranes moist  Neck: Supple, no thyromegaly, no lymphadenopathy, trachea midline  Respiratory: Diffuse wheeze to auscultation bilaterally though there is some audible air movement, nonlabored respirations   Cardiovascular: RRR, 1/6 systolic murmur, no rubs or gallops, palpable pedal pulses bilaterally  Gastrointestinal: Positive bowel sounds, soft, nontender, nondistended  Musculoskeletal: No bilateral ankle edema, no clubbing or cyanosis to extremities  Psychiatric: Appropriate affect, cooperative  Neurologic: Oriented x 3, strength symmetric in all extremities, Cranial Nerves grossly intact to confrontation, speech clear  Skin: No rashes, normal turgor.    Result Review:  I have personally reviewed the results from the time of this admission to 7/1/2025 04:27 EDT and agree with these findings:  [x]  Laboratory list / accordion  []  Microbiology  [x]  Radiology  [x]  EKG/Telemetry   []  Cardiology/Vascular   []  Pathology  []  Old records  []  Other:  Most notable findings include: I reviewed chest x-ray which is a single AP view and by my read shows increased interstitial markings, difficult to discern chronicity, radiology report mentions possible chronic interstitial disease versus edema.  I reviewed EKG which by my read shows sinus " rhythm, ventricular rate approximately 90 bpm, normal axis, nonspecific ST/T wave changes.    Assessment and Plan:    See assessment and plan documented by APC above and updated/edited by me as appropriate.      Total time spent: 35 minutes  Time spent includes time reviewing chart, face-to-face time, counseling patient/family/caregiver, ordering medications/tests/procedures, communicating with other health care professionals, documenting clinical information in the electronic health record, and coordination of care.       Robert Steward III, DO  07/01/25

## 2025-07-02 ENCOUNTER — APPOINTMENT (OUTPATIENT)
Dept: NUCLEAR MEDICINE | Facility: HOSPITAL | Age: 75
End: 2025-07-02
Payer: MEDICARE

## 2025-07-02 ENCOUNTER — APPOINTMENT (OUTPATIENT)
Dept: CT IMAGING | Facility: HOSPITAL | Age: 75
End: 2025-07-02
Payer: MEDICARE

## 2025-07-02 ENCOUNTER — APPOINTMENT (OUTPATIENT)
Dept: GENERAL RADIOLOGY | Facility: HOSPITAL | Age: 75
End: 2025-07-02
Payer: MEDICARE

## 2025-07-02 PROBLEM — J96.01 ACUTE RESPIRATORY FAILURE WITH HYPOXIA: Status: ACTIVE | Noted: 2025-07-02

## 2025-07-02 PROBLEM — I50.31 ACUTE DIASTOLIC HEART FAILURE: Status: ACTIVE | Noted: 2025-07-01

## 2025-07-02 LAB
ANION GAP SERPL CALCULATED.3IONS-SCNC: 9 MMOL/L (ref 5–15)
BASOPHILS # BLD AUTO: 0.05 10*3/MM3 (ref 0–0.2)
BASOPHILS NFR BLD AUTO: 0.3 % (ref 0–1.5)
BUN SERPL-MCNC: 74.8 MG/DL (ref 8–23)
BUN/CREAT SERPL: 38 (ref 7–25)
CALCIUM SPEC-SCNC: 8.9 MG/DL (ref 8.6–10.5)
CHLORIDE SERPL-SCNC: 110 MMOL/L (ref 98–107)
CHROMATIN AB SERPL-ACNC: 10.9 IU/ML (ref 0–14)
CO2 SERPL-SCNC: 24 MMOL/L (ref 22–29)
CREAT SERPL-MCNC: 1.97 MG/DL (ref 0.76–1.27)
CRP SERPL-MCNC: 1.5 MG/DL (ref 0–0.5)
DEPRECATED RDW RBC AUTO: 59.1 FL (ref 37–54)
EGFRCR SERPLBLD CKD-EPI 2021: 35 ML/MIN/1.73
EOSINOPHIL # BLD AUTO: 0.04 10*3/MM3 (ref 0–0.4)
EOSINOPHIL NFR BLD AUTO: 0.2 % (ref 0.3–6.2)
ERYTHROCYTE [DISTWIDTH] IN BLOOD BY AUTOMATED COUNT: 19.1 % (ref 12.3–15.4)
ERYTHROCYTE [SEDIMENTATION RATE] IN BLOOD: >130 MM/HR (ref 0–20)
GLUCOSE BLDC GLUCOMTR-MCNC: 127 MG/DL (ref 70–130)
GLUCOSE BLDC GLUCOMTR-MCNC: 182 MG/DL (ref 70–130)
GLUCOSE BLDC GLUCOMTR-MCNC: 319 MG/DL (ref 70–130)
GLUCOSE BLDC GLUCOMTR-MCNC: 377 MG/DL (ref 70–130)
GLUCOSE SERPL-MCNC: 116 MG/DL (ref 65–99)
HCT VFR BLD AUTO: 36.8 % (ref 37.5–51)
HGB BLD-MCNC: 11.1 G/DL (ref 13–17.7)
IMM GRANULOCYTES # BLD AUTO: 0.37 10*3/MM3 (ref 0–0.05)
IMM GRANULOCYTES NFR BLD AUTO: 1.9 % (ref 0–0.5)
LYMPHOCYTES # BLD AUTO: 1.67 10*3/MM3 (ref 0.7–3.1)
LYMPHOCYTES NFR BLD AUTO: 8.5 % (ref 19.6–45.3)
MCH RBC QN AUTO: 26 PG (ref 26.6–33)
MCHC RBC AUTO-ENTMCNC: 30.2 G/DL (ref 31.5–35.7)
MCV RBC AUTO: 86.2 FL (ref 79–97)
MONOCYTES # BLD AUTO: 1.38 10*3/MM3 (ref 0.1–0.9)
MONOCYTES NFR BLD AUTO: 7 % (ref 5–12)
NEUTROPHILS NFR BLD AUTO: 16.12 10*3/MM3 (ref 1.7–7)
NEUTROPHILS NFR BLD AUTO: 82.1 % (ref 42.7–76)
NRBC BLD AUTO-RTO: 0 /100 WBC (ref 0–0.2)
PLATELET # BLD AUTO: 409 10*3/MM3 (ref 140–450)
PMV BLD AUTO: 8.6 FL (ref 6–12)
POTASSIUM SERPL-SCNC: 4.7 MMOL/L (ref 3.5–5.2)
QT INTERVAL: 360 MS
QTC INTERVAL: 445 MS
RBC # BLD AUTO: 4.27 10*6/MM3 (ref 4.14–5.8)
SODIUM SERPL-SCNC: 143 MMOL/L (ref 136–145)
WBC NRBC COR # BLD AUTO: 19.63 10*3/MM3 (ref 3.4–10.8)

## 2025-07-02 PROCEDURE — 99232 SBSQ HOSP IP/OBS MODERATE 35: CPT

## 2025-07-02 PROCEDURE — 86431 RHEUMATOID FACTOR QUANT: CPT | Performed by: INTERNAL MEDICINE

## 2025-07-02 PROCEDURE — 94761 N-INVAS EAR/PLS OXIMETRY MLT: CPT

## 2025-07-02 PROCEDURE — 86225 DNA ANTIBODY NATIVE: CPT | Performed by: INTERNAL MEDICINE

## 2025-07-02 PROCEDURE — 25010000002 METHYLPREDNISOLONE PER 125 MG: Performed by: INTERNAL MEDICINE

## 2025-07-02 PROCEDURE — 25010000002 CEFEPIME PER 500 MG

## 2025-07-02 PROCEDURE — 83516 IMMUNOASSAY NONANTIBODY: CPT | Performed by: INTERNAL MEDICINE

## 2025-07-02 PROCEDURE — 86037 ANCA TITER EACH ANTIBODY: CPT | Performed by: INTERNAL MEDICINE

## 2025-07-02 PROCEDURE — 80048 BASIC METABOLIC PNL TOTAL CA: CPT | Performed by: NURSE PRACTITIONER

## 2025-07-02 PROCEDURE — 99232 SBSQ HOSP IP/OBS MODERATE 35: CPT | Performed by: INTERNAL MEDICINE

## 2025-07-02 PROCEDURE — 86235 NUCLEAR ANTIGEN ANTIBODY: CPT | Performed by: INTERNAL MEDICINE

## 2025-07-02 PROCEDURE — 34310000005 TECHNETIUM TC 99M PENTETATE KIT: Performed by: STUDENT IN AN ORGANIZED HEALTH CARE EDUCATION/TRAINING PROGRAM

## 2025-07-02 PROCEDURE — 71046 X-RAY EXAM CHEST 2 VIEWS: CPT

## 2025-07-02 PROCEDURE — 99232 SBSQ HOSP IP/OBS MODERATE 35: CPT | Performed by: STUDENT IN AN ORGANIZED HEALTH CARE EDUCATION/TRAINING PROGRAM

## 2025-07-02 PROCEDURE — 34310000005 TECHNETIUM ALBUMIN AGGREGATED: Performed by: STUDENT IN AN ORGANIZED HEALTH CARE EDUCATION/TRAINING PROGRAM

## 2025-07-02 PROCEDURE — 86200 CCP ANTIBODY: CPT | Performed by: INTERNAL MEDICINE

## 2025-07-02 PROCEDURE — 99223 1ST HOSP IP/OBS HIGH 75: CPT | Performed by: INTERNAL MEDICINE

## 2025-07-02 PROCEDURE — 71250 CT THORAX DX C-: CPT

## 2025-07-02 PROCEDURE — 94799 UNLISTED PULMONARY SVC/PX: CPT

## 2025-07-02 PROCEDURE — 25010000002 CEFEPIME PER 500 MG: Performed by: STUDENT IN AN ORGANIZED HEALTH CARE EDUCATION/TRAINING PROGRAM

## 2025-07-02 PROCEDURE — 86140 C-REACTIVE PROTEIN: CPT | Performed by: INTERNAL MEDICINE

## 2025-07-02 PROCEDURE — 25010000002 HEPARIN (PORCINE) PER 1000 UNITS: Performed by: NURSE PRACTITIONER

## 2025-07-02 PROCEDURE — A9539 TC99M PENTETATE: HCPCS | Performed by: STUDENT IN AN ORGANIZED HEALTH CARE EDUCATION/TRAINING PROGRAM

## 2025-07-02 PROCEDURE — A9540 TC99M MAA: HCPCS | Performed by: STUDENT IN AN ORGANIZED HEALTH CARE EDUCATION/TRAINING PROGRAM

## 2025-07-02 PROCEDURE — 82948 REAGENT STRIP/BLOOD GLUCOSE: CPT

## 2025-07-02 PROCEDURE — 78582 LUNG VENTILAT&PERFUS IMAGING: CPT

## 2025-07-02 PROCEDURE — 85025 COMPLETE CBC W/AUTO DIFF WBC: CPT | Performed by: STUDENT IN AN ORGANIZED HEALTH CARE EDUCATION/TRAINING PROGRAM

## 2025-07-02 PROCEDURE — 63710000001 INSULIN LISPRO (HUMAN) PER 5 UNITS: Performed by: NURSE PRACTITIONER

## 2025-07-02 PROCEDURE — 85652 RBC SED RATE AUTOMATED: CPT | Performed by: INTERNAL MEDICINE

## 2025-07-02 RX ORDER — GUAIFENESIN 600 MG/1
1200 TABLET, EXTENDED RELEASE ORAL EVERY 12 HOURS SCHEDULED
Status: COMPLETED | OUTPATIENT
Start: 2025-07-02 | End: 2025-07-06

## 2025-07-02 RX ORDER — WATER 10 ML/10ML
INJECTION INTRAMUSCULAR; INTRAVENOUS; SUBCUTANEOUS
Status: COMPLETED
Start: 2025-07-02 | End: 2025-07-02

## 2025-07-02 RX ORDER — ALBUTEROL SULFATE 0.83 MG/ML
2.5 SOLUTION RESPIRATORY (INHALATION) EVERY 6 HOURS PRN
Status: DISCONTINUED | OUTPATIENT
Start: 2025-07-02 | End: 2025-07-09 | Stop reason: HOSPADM

## 2025-07-02 RX ORDER — DILTIAZEM HYDROCHLORIDE 180 MG/1
360 CAPSULE, COATED, EXTENDED RELEASE ORAL
Status: DISCONTINUED | OUTPATIENT
Start: 2025-07-02 | End: 2025-07-09 | Stop reason: HOSPADM

## 2025-07-02 RX ORDER — BUDESONIDE 0.5 MG/2ML
0.5 INHALANT ORAL
Status: DISCONTINUED | OUTPATIENT
Start: 2025-07-02 | End: 2025-07-09 | Stop reason: HOSPADM

## 2025-07-02 RX ORDER — METHYLPREDNISOLONE SODIUM SUCCINATE 125 MG/2ML
60 INJECTION, POWDER, LYOPHILIZED, FOR SOLUTION INTRAMUSCULAR; INTRAVENOUS EVERY 24 HOURS
Status: DISCONTINUED | OUTPATIENT
Start: 2025-07-02 | End: 2025-07-06

## 2025-07-02 RX ORDER — ARFORMOTEROL TARTRATE 15 UG/2ML
15 SOLUTION RESPIRATORY (INHALATION)
Status: DISCONTINUED | OUTPATIENT
Start: 2025-07-02 | End: 2025-07-09 | Stop reason: HOSPADM

## 2025-07-02 RX ORDER — SODIUM CHLORIDE FOR INHALATION 7 %
4 VIAL, NEBULIZER (ML) INHALATION
Status: DISPENSED | OUTPATIENT
Start: 2025-07-02 | End: 2025-07-07

## 2025-07-02 RX ADMIN — INSULIN LISPRO 5 UNITS: 100 INJECTION, SOLUTION INTRAVENOUS; SUBCUTANEOUS at 18:13

## 2025-07-02 RX ADMIN — WATER: 1 INJECTION INTRAMUSCULAR; INTRAVENOUS; SUBCUTANEOUS at 10:21

## 2025-07-02 RX ADMIN — KIT FOR THE PREPARATION OF TECHNETIUM TC 99M ALBUMIN AGGREGATED 1 DOSE: 2.5 INJECTION, POWDER, FOR SOLUTION INTRAVENOUS at 12:45

## 2025-07-02 RX ADMIN — DILTIAZEM HYDROCHLORIDE 360 MG: 180 CAPSULE, EXTENDED RELEASE ORAL at 15:19

## 2025-07-02 RX ADMIN — INSULIN LISPRO 2 UNITS: 100 INJECTION, SOLUTION INTRAVENOUS; SUBCUTANEOUS at 12:03

## 2025-07-02 RX ADMIN — CEFEPIME 2000 MG: 2 INJECTION, POWDER, FOR SOLUTION INTRAVENOUS at 06:43

## 2025-07-02 RX ADMIN — CEFEPIME 2000 MG: 2 INJECTION, POWDER, FOR SOLUTION INTRAVENOUS at 18:12

## 2025-07-02 RX ADMIN — BUDESONIDE 0.5 MG: 0.5 SUSPENSION RESPIRATORY (INHALATION) at 20:45

## 2025-07-02 RX ADMIN — GUAIFENESIN 1200 MG: 600 TABLET, EXTENDED RELEASE ORAL at 10:21

## 2025-07-02 RX ADMIN — ARFORMOTEROL TARTRATE 15 MCG: 15 SOLUTION RESPIRATORY (INHALATION) at 20:45

## 2025-07-02 RX ADMIN — Medication 10 ML: at 20:12

## 2025-07-02 RX ADMIN — METOPROLOL TARTRATE 25 MG: 25 TABLET, FILM COATED ORAL at 20:12

## 2025-07-02 RX ADMIN — SODIUM CHLORIDE SOLN NEBU 7% 4 ML: 7 NEBU SOLN at 20:53

## 2025-07-02 RX ADMIN — KIT FOR THE PREPARATION OF TECHNETIUM TC 99M PENTETATE 1 DOSE: 20 INJECTION, POWDER, LYOPHILIZED, FOR SOLUTION INTRAVENOUS; RESPIRATORY (INHALATION) at 12:18

## 2025-07-02 RX ADMIN — METOPROLOL TARTRATE 25 MG: 25 TABLET, FILM COATED ORAL at 09:18

## 2025-07-02 RX ADMIN — ASPIRIN 81 MG: 81 TABLET, COATED ORAL at 09:18

## 2025-07-02 RX ADMIN — NIFEDIPINE 30 MG: 30 TABLET, EXTENDED RELEASE ORAL at 09:18

## 2025-07-02 RX ADMIN — GUAIFENESIN 1200 MG: 600 TABLET, EXTENDED RELEASE ORAL at 20:12

## 2025-07-02 RX ADMIN — METHYLPREDNISOLONE SODIUM SUCCINATE 60 MG: 125 INJECTION INTRAMUSCULAR; INTRAVENOUS at 10:21

## 2025-07-02 RX ADMIN — INSULIN LISPRO 6 UNITS: 100 INJECTION, SOLUTION INTRAVENOUS; SUBCUTANEOUS at 22:16

## 2025-07-02 RX ADMIN — Medication 10 ML: at 09:19

## 2025-07-02 RX ADMIN — PANTOPRAZOLE SODIUM 40 MG: 40 TABLET, DELAYED RELEASE ORAL at 06:43

## 2025-07-02 RX ADMIN — PRAVASTATIN SODIUM 20 MG: 20 TABLET ORAL at 20:12

## 2025-07-02 RX ADMIN — FERROUS SULFATE TAB 325 MG (65 MG ELEMENTAL FE) 325 MG: 325 (65 FE) TAB at 09:18

## 2025-07-02 RX ADMIN — BUDESONIDE AND FORMOTEROL FUMARATE DIHYDRATE 2 PUFF: 160; 4.5 AEROSOL RESPIRATORY (INHALATION) at 07:50

## 2025-07-02 RX ADMIN — HEPARIN SODIUM 5000 UNITS: 5000 INJECTION INTRAVENOUS; SUBCUTANEOUS at 09:18

## 2025-07-02 RX ADMIN — HEPARIN SODIUM 5000 UNITS: 5000 INJECTION INTRAVENOUS; SUBCUTANEOUS at 20:12

## 2025-07-02 NOTE — PLAN OF CARE
Problem: Adult Inpatient Plan of Care  Goal: Plan of Care Review  Outcome: Progressing  Goal: Patient-Specific Goal (Individualized)  Outcome: Progressing  Goal: Absence of Hospital-Acquired Illness or Injury  Outcome: Progressing  Intervention: Identify and Manage Fall Risk  Recent Flowsheet Documentation  Taken 7/2/2025 0600 by Adam Garcia RN  Safety Promotion/Fall Prevention:   clutter free environment maintained   mobility aid in reach   nonskid shoes/slippers when out of bed   room organization consistent   safety round/check completed  Taken 7/2/2025 0400 by Adam Garcia RN  Safety Promotion/Fall Prevention:   clutter free environment maintained   mobility aid in reach   nonskid shoes/slippers when out of bed   room organization consistent   safety round/check completed  Taken 7/2/2025 0200 by Adam Garcia RN  Safety Promotion/Fall Prevention:   clutter free environment maintained   mobility aid in reach   nonskid shoes/slippers when out of bed   room organization consistent   safety round/check completed  Taken 7/2/2025 0050 by Adam Garcia RN  Safety Promotion/Fall Prevention:   clutter free environment maintained   mobility aid in reach   nonskid shoes/slippers when out of bed   room organization consistent   safety round/check completed  Taken 7/2/2025 0000 by Adam Garcia RN  Safety Promotion/Fall Prevention:   clutter free environment maintained   mobility aid in reach   nonskid shoes/slippers when out of bed   room organization consistent   safety round/check completed  Taken 7/1/2025 2200 by Adam Garcia RN  Safety Promotion/Fall Prevention:   clutter free environment maintained   mobility aid in reach   nonskid shoes/slippers when out of bed   room organization consistent   safety round/check completed  Taken 7/1/2025 2000 by Adam Garcia RN  Safety Promotion/Fall Prevention:   clutter free environment maintained   mobility aid in reach   nonskid shoes/slippers when out of bed   room  organization consistent   safety round/check completed  Intervention: Prevent Skin Injury  Recent Flowsheet Documentation  Taken 7/2/2025 0600 by Adam Garcia RN  Body Position:   left   lower extremity elevated   turned  Skin Protection:   hydrocolloids used   incontinence pads utilized   pulse oximeter probe site changed   silicone foam dressing in place   skin sealant/moisture barrier applied  Taken 7/2/2025 0400 by Adam Garcia RN  Skin Protection:   hydrocolloids used   incontinence pads utilized   pulse oximeter probe site changed   silicone foam dressing in place   skin sealant/moisture barrier applied  Taken 7/2/2025 0200 by Adam Garcia RN  Skin Protection:   hydrocolloids used   incontinence pads utilized   pulse oximeter probe site changed   silicone foam dressing in place   skin sealant/moisture barrier applied  Taken 7/2/2025 0050 by Adam Garcia RN  Body Position: position changed independently  Skin Protection:   hydrocolloids used   incontinence pads utilized   pulse oximeter probe site changed   silicone foam dressing in place   skin sealant/moisture barrier applied  Taken 7/2/2025 0000 by Adam Garcia RN  Body Position:   turned   right   lower extremity elevated  Skin Protection:   hydrocolloids used   incontinence pads utilized   pulse oximeter probe site changed   silicone foam dressing in place   skin sealant/moisture barrier applied  Taken 7/1/2025 2200 by Adam Garcia RN  Body Position:   turned   left   lower extremity elevated  Skin Protection:   hydrocolloids used   incontinence pads utilized   pulse oximeter probe site changed   silicone foam dressing in place   skin sealant/moisture barrier applied  Taken 7/1/2025 2000 by Adam Garcia RN  Skin Protection:   hydrocolloids used   incontinence pads utilized   pulse oximeter probe site changed   silicone foam dressing in place   skin sealant/moisture barrier applied  Intervention: Prevent and Manage VTE (Venous Thromboembolism)  Risk  Recent Flowsheet Documentation  Taken 7/2/2025 0050 by Adam Garcia RN  VTE Prevention/Management:   SCDs (sequential compression devices) off   patient refused intervention  Intervention: Prevent Infection  Recent Flowsheet Documentation  Taken 7/2/2025 0600 by Adam Garcia RN  Infection Prevention:   hand hygiene promoted   single patient room provided  Taken 7/2/2025 0400 by Adam Garcia RN  Infection Prevention:   hand hygiene promoted   single patient room provided  Taken 7/2/2025 0200 by Adam Garcia RN  Infection Prevention:   hand hygiene promoted   single patient room provided  Taken 7/2/2025 0050 by Adam Garcia RN  Infection Prevention:   hand hygiene promoted   single patient room provided  Taken 7/2/2025 0000 by Adam Garcia RN  Infection Prevention:   hand hygiene promoted   single patient room provided  Taken 7/1/2025 2200 by Adam Garcia RN  Infection Prevention:   hand hygiene promoted   single patient room provided  Taken 7/1/2025 2000 by Adam Garcia RN  Infection Prevention:   hand hygiene promoted   single patient room provided  Goal: Optimal Comfort and Wellbeing  Outcome: Progressing  Intervention: Provide Person-Centered Care  Recent Flowsheet Documentation  Taken 7/2/2025 0600 by Adam Garcia RN  Trust Relationship/Rapport: care explained  Taken 7/2/2025 0400 by Adam Garcia RN  Trust Relationship/Rapport: care explained  Taken 7/2/2025 0200 by Adam Garcia RN  Trust Relationship/Rapport: care explained  Taken 7/2/2025 0050 by Adam Garcia RN  Trust Relationship/Rapport: care explained  Taken 7/2/2025 0000 by Adam Garcia RN  Trust Relationship/Rapport: care explained  Taken 7/1/2025 2200 by Adam Garcia RN  Trust Relationship/Rapport: care explained  Taken 7/1/2025 2000 by Adam Garcia RN  Trust Relationship/Rapport: care explained  Goal: Readiness for Transition of Care  Outcome: Progressing     Problem: Comorbidity Management  Goal: Maintenance of COPD Symptom  Control  Outcome: Progressing  Intervention: Maintain COPD (Chronic Obstructive Pulmonary Disease) Symptom Control  Recent Flowsheet Documentation  Taken 7/2/2025 0600 by Adam Garcia RN  Medication Review/Management: medications reviewed  Taken 7/2/2025 0050 by Adam Garcia RN  Medication Review/Management: medications reviewed  Taken 7/1/2025 2000 by Adam Garcia RN  Medication Review/Management: medications reviewed  Goal: Maintenance of Heart Failure Symptom Control  Outcome: Progressing  Intervention: Maintain Heart Failure Management  Recent Flowsheet Documentation  Taken 7/2/2025 0600 by Adam Garcia RN  Medication Review/Management: medications reviewed  Taken 7/2/2025 0050 by Adam Garcia RN  Medication Review/Management: medications reviewed  Taken 7/1/2025 2000 by Adam Garcia RN  Medication Review/Management: medications reviewed  Goal: Blood Pressure in Desired Range  Outcome: Progressing  Intervention: Maintain Blood Pressure Management  Recent Flowsheet Documentation  Taken 7/2/2025 0600 by Adam Garcia RN  Medication Review/Management: medications reviewed  Taken 7/2/2025 0050 by Adam Garcia RN  Medication Review/Management: medications reviewed  Taken 7/1/2025 2000 by Adam Garcia RN  Medication Review/Management: medications reviewed     Problem: Skin Injury Risk Increased  Goal: Skin Health and Integrity  Outcome: Progressing  Intervention: Optimize Skin Protection  Recent Flowsheet Documentation  Taken 7/2/2025 0600 by Adam Garcia RN  Activity Management: activity encouraged  Pressure Reduction Techniques:   heels elevated off bed   pressure points protected   weight shift assistance provided  Head of Bed (HOB) Positioning: HOB elevated  Pressure Reduction Devices:   heel offloading device utilized   foam padding utilized   positioning supports utilized   pressure-redistributing mattress utilized  Skin Protection:   hydrocolloids used   incontinence pads utilized   pulse oximeter  probe site changed   silicone foam dressing in place   skin sealant/moisture barrier applied  Taken 7/2/2025 0400 by Adam Garcia RN  Activity Management: activity encouraged  Pressure Reduction Techniques:   heels elevated off bed   pressure points protected   weight shift assistance provided  Pressure Reduction Devices:   heel offloading device utilized   foam padding utilized   positioning supports utilized   pressure-redistributing mattress utilized  Skin Protection:   hydrocolloids used   incontinence pads utilized   pulse oximeter probe site changed   silicone foam dressing in place   skin sealant/moisture barrier applied  Taken 7/2/2025 0200 by Adam Garcia RN  Activity Management: activity encouraged  Pressure Reduction Techniques:   heels elevated off bed   pressure points protected   weight shift assistance provided  Pressure Reduction Devices:   heel offloading device utilized   foam padding utilized   positioning supports utilized   pressure-redistributing mattress utilized  Skin Protection:   hydrocolloids used   incontinence pads utilized   pulse oximeter probe site changed   silicone foam dressing in place   skin sealant/moisture barrier applied  Taken 7/2/2025 0050 by Adam Garcia RN  Activity Management: activity encouraged  Pressure Reduction Techniques:   heels elevated off bed   pressure points protected   weight shift assistance provided  Head of Bed (HOB) Positioning: HOB elevated  Pressure Reduction Devices:   heel offloading device utilized   foam padding utilized   positioning supports utilized   pressure-redistributing mattress utilized  Skin Protection:   hydrocolloids used   incontinence pads utilized   pulse oximeter probe site changed   silicone foam dressing in place   skin sealant/moisture barrier applied  Taken 7/2/2025 0000 by Adam Garcia RN  Activity Management: activity encouraged  Pressure Reduction Techniques:   heels elevated off bed   pressure points protected   weight  shift assistance provided  Head of Bed (HOB) Positioning: [facility] elevated  Pressure Reduction Devices:   heel offloading device utilized   foam padding utilized   positioning supports utilized   pressure-redistributing mattress utilized  Skin Protection:   hydrocolloids used   incontinence pads utilized   pulse oximeter probe site changed   silicone foam dressing in place   skin sealant/moisture barrier applied  Taken 7/1/2025 2200 by Adam Garcia RN  Activity Management: activity encouraged  Pressure Reduction Techniques:   heels elevated off bed   pressure points protected   weight shift assistance provided  Head of Bed (HOB) Positioning: [facility] elevated  Pressure Reduction Devices:   heel offloading device utilized   foam padding utilized   positioning supports utilized   pressure-redistributing mattress utilized  Skin Protection:   hydrocolloids used   incontinence pads utilized   pulse oximeter probe site changed   silicone foam dressing in place   skin sealant/moisture barrier applied  Taken 7/1/2025 2000 by Adam Garcia RN  Activity Management: activity encouraged  Pressure Reduction Techniques:   heels elevated off bed   pressure points protected   weight shift assistance provided  Pressure Reduction Devices:   heel offloading device utilized   foam padding utilized   positioning supports utilized   pressure-redistributing mattress utilized  Skin Protection:   hydrocolloids used   incontinence pads utilized   pulse oximeter probe site changed   silicone foam dressing in place   skin sealant/moisture barrier applied     Problem: Fall Injury Risk  Goal: Absence of Fall and Fall-Related Injury  Outcome: Progressing  Intervention: Identify and Manage Contributors  Recent Flowsheet Documentation  Taken 7/2/2025 0600 by Adam Garcia RN  Medication Review/Management: medications reviewed  Taken 7/2/2025 0050 by Adam Garcia RN  Medication Review/Management: medications reviewed  Taken 7/1/2025 2000 by Adam Garcia  RN  Medication Review/Management: medications reviewed  Intervention: Promote Injury-Free Environment  Recent Flowsheet Documentation  Taken 7/2/2025 0600 by Adam Garcia RN  Safety Promotion/Fall Prevention:   clutter free environment maintained   mobility aid in reach   nonskid shoes/slippers when out of bed   room organization consistent   safety round/check completed  Taken 7/2/2025 0400 by Adam Garcia RN  Safety Promotion/Fall Prevention:   clutter free environment maintained   mobility aid in reach   nonskid shoes/slippers when out of bed   room organization consistent   safety round/check completed  Taken 7/2/2025 0200 by Adam Garcia RN  Safety Promotion/Fall Prevention:   clutter free environment maintained   mobility aid in reach   nonskid shoes/slippers when out of bed   room organization consistent   safety round/check completed  Taken 7/2/2025 0050 by Adam Garcia RN  Safety Promotion/Fall Prevention:   clutter free environment maintained   mobility aid in reach   nonskid shoes/slippers when out of bed   room organization consistent   safety round/check completed  Taken 7/2/2025 0000 by Adam Garcia RN  Safety Promotion/Fall Prevention:   clutter free environment maintained   mobility aid in reach   nonskid shoes/slippers when out of bed   room organization consistent   safety round/check completed  Taken 7/1/2025 2200 by Adam Garcia RN  Safety Promotion/Fall Prevention:   clutter free environment maintained   mobility aid in reach   nonskid shoes/slippers when out of bed   room organization consistent   safety round/check completed  Taken 7/1/2025 2000 by Adam Garcia RN  Safety Promotion/Fall Prevention:   clutter free environment maintained   mobility aid in reach   nonskid shoes/slippers when out of bed   room organization consistent   safety round/check completed   Goal Outcome Evaluation:

## 2025-07-02 NOTE — CASE MANAGEMENT/SOCIAL WORK
Continued Stay Note  [facility]     Patient Name: Baldemar Anderson  MRN: [id number]  Today's Date: 7/2/2025    Admit Date: 6/30/2025    Plan: Home with spouse   Discharge Plan       Row Name 07/02/25 0832       Plan    Plan Home with spouse    Patient/Family in Agreement with Plan yes    Plan Comments Spoke to patient at bedside. Plan is home with spouse. Patient is denies any CM discharge needs. CM will continue to follow.    Final Discharge Disposition Code 01 - home or self-care                   Discharge Codes    No documentation.                 Expected Discharge Date and Time       Expected Discharge Date Expected Discharge Time    Jul 4, 2025               Christian Calhoun RN

## 2025-07-02 NOTE — PROGRESS NOTES
Pharmacy to dose Cefepime  Pneumonia   Estimated Creatinine Clearance: 37.1 mL/min (A) (by C-G formula based on SCr of 1.97 mg/dL (H)).     Results from last 7 days   Lab Units 07/02/25  0542 07/01/25  0100   CREATININE mg/dL 1.97* 2.89*      Plan: Cefepime 2 G IV Q12H; pharmacist to follow.    Fanny Zamudio RPH

## 2025-07-02 NOTE — CONSULTS
Pulmonary Consult Note     Chief Complaint:  Acute diastolic heart failure    History of Present Illness     75-year-old male with a past medical history significant for hypertension, hyperlipidemia, CHF, diabetes mellitus type 2, anemia, COPD and tobacco abuse.  Patient was transferred to [facility] for TAVR evaluation currently on high flow nasal cannula.  Pulmonary was consulted due to concern for possible pulmonary fibrosis.  Patient has shortness of breath.  Has been diuresed feels better than on admission.  Still on high flow nasal cannula.  Denies any chest pain, nausea, fever, or chills.    Problem List, Surgical History, Family, Social History, and ROS     Past Medical History:   Diagnosis Date    Anemia     CHF (congestive heart failure)     Chronic kidney disease     COPD (chronic obstructive pulmonary disease)     Diabetes mellitus     Dyslipidemia     Hypertension       Past Surgical History:   Procedure Laterality Date    BRONCHOSCOPY      CHOLECYSTECTOMY         Allergies   Allergen Reactions    Sulfa Antibiotics Anaphylaxis     No current facility-administered medications on file prior to encounter.     Current Outpatient Medications on File Prior to Encounter   Medication Sig    aspirin 81 MG EC tablet Take 1 tablet by mouth Daily.    carvedilol (COREG) 3.125 MG tablet Take 1 tablet by mouth 2 (Two) Times a Day With Meals.    dilTIAZem (TIAZAC) 360 MG 24 hr capsule Take 1 capsule by mouth Daily.    NIFEdipine XL (PROCARDIA XL) 30 MG 24 hr tablet Take 1 tablet by mouth Daily.    albuterol (PROVENTIL) (2.5 MG/3ML) 0.083% nebulizer solution Take 2.5 mg by nebulization Every 6 (Six) Hours As Needed for Wheezing.    budesonide-formoterol (SYMBICORT) 160-4.5 MCG/ACT inhaler Inhale 2 puffs 2 (Two) Times a Day.    esomeprazole (nexIUM) 40 MG capsule     ferrous sulfate 325 (65 FE) MG tablet Take 1 tablet by mouth Daily With Breakfast.    glimepiride (AMARYL) 1 MG tablet Take 1 tablet by mouth  "Every Morning Before Breakfast.    pravastatin (PRAVACHOL) 20 MG tablet Take 1 tablet by mouth Every Night.    torsemide (DEMADEX) 20 MG tablet Take 1 tablet by mouth Daily.     MEDICATION LIST AND ALLERGIES REVIEWED.    Family History   Problem Relation Age of Onset    Heart failure Mother     Pneumonia Father      Social History     Tobacco Use    Smoking status: Former     Types: Cigarettes    Smokeless tobacco: Never   Vaping Use    Vaping status: Never Used   Substance Use Topics    Alcohol use: Never    Drug use: Never     Social History     Social History Narrative    Not on file     FAMILY AND SOCIAL HISTORY REVIEWED.    Review of Systems   Constitutional:  Negative for activity change, appetite change, chills and fever.   HENT:  Negative for congestion, sore throat and voice change.    Eyes:  Negative for photophobia and visual disturbance.   Respiratory:  Positive for shortness of breath. Negative for cough and wheezing.    Cardiovascular:  Negative for chest pain, palpitations and leg swelling.   Gastrointestinal:  Negative for abdominal distention and abdominal pain.   Genitourinary:  Negative for difficulty urinating and flank pain.   Musculoskeletal:  Negative for myalgias and neck stiffness.   Skin:  Negative for color change and rash.   Neurological:  Negative for dizziness, seizures and headaches.   Hematological:  Negative for adenopathy.   Psychiatric/Behavioral:  Negative for agitation, hallucinations and sleep disturbance.      ALL OTHER SYSTEMS REVIEWED AND ARE NEGATIVE.    Physical Exam and Clinical Information   /77 (BP Location: Right arm, Patient Position: Sitting)   Pulse 91   Temp 98.1 °F (36.7 °C) (Oral)   Resp 20   Ht 180.3 cm (71\")   Wt 79.7 kg (175 lb 11.3 oz)   SpO2 95%   BMI 24.51 kg/m²   Physical Exam  Vitals and nursing note reviewed.   Constitutional:       General: He is not in acute distress.     Appearance: He is well-developed and normal weight. He is not " "ill-appearing or toxic-appearing.   Cardiovascular:      Rate and Rhythm: Normal rate and regular rhythm.      Pulses: Normal pulses.      Heart sounds: Normal heart sounds. No murmur heard.     No gallop.   Pulmonary:      Effort: Pulmonary effort is normal.      Breath sounds: Rales present. No wheezing or rhonchi.   Musculoskeletal:      Right lower leg: Edema present.      Left lower leg: Edema present.   Neurological:      Mental Status: He is alert.         Results from last 7 days   Lab Units 07/02/25  0542 07/01/25  0100   WBC 10*3/mm3 19.63* 21.10*   HEMOGLOBIN g/dL 11.1* 9.8*   PLATELETS 10*3/mm3 409 406     Results from last 7 days   Lab Units 07/02/25  0542 07/01/25  0429 07/01/25  0100   SODIUM mmol/L 143  --  138   POTASSIUM mmol/L 4.7  --  4.6   CO2 mmol/L 24.0  --  18.5*   BUN mg/dL 74.8*  --  75.5*   CREATININE mg/dL 1.97*  --  2.89*   MAGNESIUM mg/dL  --  2.3 2.2   GLUCOSE mg/dL 116*  --  320*     Estimated Creatinine Clearance: 37.1 mL/min (A) (by C-G formula based on SCr of 1.97 mg/dL (H)).  Results from last 7 days   Lab Units 07/01/25  0100   HEMOGLOBIN A1C % 7.33*         No results found for: \"LACTATE\"         I reviewed the patient's results/ Images and I agree with the reports     Impression     Acute diastolic heart failure    Anemia    CHF (congestive heart failure)    Chronic kidney disease    COPD (chronic obstructive pulmonary disease)    Diabetes mellitus    Dyslipidemia    Hypertension    Acute respiratory failure with hypoxia      Plan/Recommendations     -In reviewing the patient's chart.  There are multiple CT scan reports over the last couple of years, initially everything was being described as emphysema and then the last 2 CT scans have mention the possibility of fibrosis.  Without seeing the images very difficult to tell.  It is possible he actually has emphysema and then the pulmonary edema given the interstitial thickening giving it the appearance of pulm fibrosis.  At this " point though while hospitalized I do not think much would change in the treatment.  - I recommend that we start him on Solu-Medrol 60 mg daily, continue with diuretics to maintain a negative fluid balance.  Mainly this is being driven by cardiology.  - For the possibility of COPD Brovana and budesonide nebs with albuterol as needed  -Continue antibiotics for possible pneumonia, consider 7 days of therapy  - Start airway clearance regimen  - I will send off labs for ILD  - I will obtain a CT scan of the chest as appropriate given the patient's oxygen level  - Wean oxygen to saturation greater than 88%      CChio Tong DO  Pulmonary and Critical Care Medicine  07/02/25 09:53 EDT     CC: Aparna Mendoza MD

## 2025-07-02 NOTE — PROGRESS NOTES
"[facility] Cardiology Daily Note       LOS: 1 day   Patient Care Team:  Aparna Mendoza MD as PCP - General (Family Medicine)    Chief Complaint: Aortic stenosis    Subjective     Subjective: No complaints this morning.  97% on 45% high flow nasal cannula.  Blood pressures have been mildly elevated overnight.  Heart rates in the 60s and 70s.  Creatinine has improved from 2.89 yesterday to 1.97 today.    Review of Systems:   As above.    Medications:  aspirin, 81 mg, Oral, Daily  budesonide-formoterol, 2 puff, Inhalation, BID - RT  cefepime, 2,000 mg, Intravenous, Q24H  [Held by provider] dilTIAZem CD, 120 mg, Oral, Q24H  ferrous sulfate, 325 mg, Oral, Daily With Breakfast  heparin (porcine), 5,000 Units, Subcutaneous, Q12H  insulin lispro, 2-7 Units, Subcutaneous, 4x Daily AC & at Bedtime  [Held by provider] lisinopril, 10 mg, Oral, Q24H  metoprolol tartrate, 25 mg, Oral, Q12H  NIFEdipine XL, 30 mg, Oral, Daily  pantoprazole, 40 mg, Oral, Q AM  pravastatin, 20 mg, Oral, Nightly  sodium chloride, 10 mL, Intravenous, Q12H  [Held by provider] torsemide, 20 mg, Oral, Daily        Objective     Vital Sign Min/Max for last 24 hours  Temp  Min: 97.7 °F (36.5 °C)  Max: 98.3 °F (36.8 °C)   BP  Min: 162/68  Max: 177/72   Pulse  Min: 66  Max: 94   Resp  Min: 16  Max: 18   SpO2  Min: 91 %  Max: 100 %   Flow (L/min) (Oxygen Therapy)  Min: 50  Max: 60   Weight  Min: 79.7 kg (175 lb 11.3 oz)  Max: 82.6 kg (182 lb)      Intake/Output Summary (Last 24 hours) at 7/2/2025 0715  Last data filed at 7/2/2025 0448  Gross per 24 hour   Intake 720 ml   Output 2590 ml   Net -1870 ml        Flowsheet Rows      Flowsheet Row First Filed Value   Admission Height 180.3 cm (71\") Documented at 06/30/2025 2119   Admission Weight 84.3 kg (185 lb 13.6 oz) Documented at 06/30/2025 2119            Physical Exam:    General: Alert and oriented.   Cardiovascular: Heart has a nondisplaced focal PMI. Regular rate and rhythm with 2/6 SE " "murmur, no gallop or rub.  Lungs: Fine crackles are heard.  Equal expansion is noted.   Abdomen: Soft, nontender.  Extremities: Show no edema.   Skin: warm and dry.     Results Review:    I reviewed the patient's new clinical results.  EKG:  Tele: Sinus rhythm    Labs:    Results from last 7 days   Lab Units 07/02/25  0542 07/01/25  0100   SODIUM mmol/L 143 138   POTASSIUM mmol/L 4.7 4.6   CHLORIDE mmol/L 110* 105   CO2 mmol/L 24.0 18.5*   BUN mg/dL 74.8* 75.5*   CREATININE mg/dL 1.97* 2.89*   CALCIUM mg/dL 8.9 8.6   BILIRUBIN mg/dL  --  <0.2   ALK PHOS U/L  --  70   ALT (SGPT) U/L  --  37   AST (SGOT) U/L  --  21   GLUCOSE mg/dL 116* 320*     Results from last 7 days   Lab Units 07/02/25  0542 07/01/25  0100   WBC 10*3/mm3 19.63* 21.10*   HEMOGLOBIN g/dL 11.1* 9.8*   HEMATOCRIT % 36.8* 32.7*   PLATELETS 10*3/mm3 409 406     Lab Results   Component Value Date    TROPONINT 37 (H) 07/01/2025    TROPONINT 34 (H) 07/01/2025     No results found for: \"CHOL\"  No results found for: \"TRIG\"  No results found for: \"HDL\"  No components found for: \"LDLCALC\"  Lab Results   Component Value Date    INR 1.16 (H) 07/01/2025    INR 1.06 06/27/2025    INR 1.04 06/21/2025    PROTIME 15.5 (H) 07/01/2025    PROTIME 10.8 06/27/2025    PROTIME 10.9 06/21/2025         Ejection Fraction:    Assessment   Assessment:    Valvular heart disease   Echo 1/2025: Moderate aortic stenosis  Echo 6/30/2025 (outside facility): Report states severe aortic valve stenosis, normal EF but the mean aortic valve gradient is only 22 mmHg.  HFpEF  Echo 6/30/2025 (outside facility): Report states severe aortic valve stenosis, normal EF however the mean aortic valve gradient is only 22 mmHg.  Hypoxemia/interstitial lung disease/pneumonia  Hypertension  Hyperlipidemia  Diabetes mellitus  Chronic kidney disease  COPD  Chronic anemia    Plan:    He is borderline for low-flow low gradient aortic stenosis with a stroke-volume index of 35 mL/min/m2  however the aortic " valve appears to open adequately on echo images.    He has multiple risk factors for coronary artery disease and needs to undergo cardiac catheterization prior to any additional workup for his aortic stenosis.  If no significant findings are present at the time of cardiac catheterization then further pulmonary workup should be performed for likely pulmonary fibrosis prior to considering him for AVR.  Hypoxemia may have been the driving force for his pulmonary edema.    Roseann Carrillo MD  07/02/25  07:15 EDT

## 2025-07-02 NOTE — PROGRESS NOTES
[facility] Cardiothoracic Surgery In-Patient Progress Note     LOS: 1 day     Chief Complaint: Aortic stenosis    Subjective  Sitting up in chair. On HFNC 45L saturations 92%. States dyspnea has improved.    Objective  Vital Signs  Temp:  [97.7 °F (36.5 °C)-98.3 °F (36.8 °C)] 98 °F (36.7 °C)  Heart Rate:  [66-94] 76  Resp:  [16-18] 16  BP: (162-177)/(66-78) 168/66        Physical Exam:   General Appearance: alert, appears stated age and cooperative   Lungs: Diminished bases bilaterally   Heart: Grade II systolic murmur noted     Results     Results from last 7 days   Lab Units 07/02/25  0542   WBC 10*3/mm3 19.63*   HEMOGLOBIN g/dL 11.1*   HEMATOCRIT % 36.8*   PLATELETS 10*3/mm3 409     Results from last 7 days   Lab Units 07/02/25  0542   SODIUM mmol/L 143   POTASSIUM mmol/L 4.7   CHLORIDE mmol/L 110*   CO2 mmol/L 24.0   BUN mg/dL 74.8*   CREATININE mg/dL 1.97*   GLUCOSE mg/dL 116*   CALCIUM mg/dL 8.9     Carotid duplex    Right internal carotid artery demonstrates a less than 50% stenosis.    Antegrade right vertebral flow.    Left internal carotid artery demonstrates a less than 50% stenosis.    Antegrade left vertebral flow.    TTE: 7/1/2025    Left ventricular systolic function is normal. Calculated left ventricular EF = 61.5%    Left ventricular wall thickness is consistent with hypertrophy.    Left ventricular diastolic function was normal.    The left atrial cavity is dilated.    Left atrial volume is mildly increased.    Moderate aortic valve stenosis is present.    Aortic valve maximum pressure gradient is 38 mmHg. Aortic valve mean pressure gradient is 20 mmHg.    Systolic anterior motion of the anterior mitral leaflet is present.    Assessment    Heart failure    Anemia    CHF (congestive heart failure)    Chronic kidney disease    COPD (chronic obstructive pulmonary disease)    Diabetes mellitus    Dyslipidemia    Hypertension      Plan   Echo results noted above  Heart catheterization scheduled for  7/7  Pulmonary consulted for pulmonary fibrosis      Taylor Hutchison, APRN  07/02/25  07:50 EDT

## 2025-07-02 NOTE — PROGRESS NOTES
[facility] Medicine Services  PROGRESS NOTE    Patient Name: Baldemar Anderson  : 1950  MRN: [id number]    Date of Admission: 2025  Primary Care Physician: Aparna Mendoza MD    Subjective   Subjective     CC:  F/u hypoxemia    HPI:  No new issues overnight.  Family visiting, we discussed echo findings and plan      Objective   Objective     Vital Signs:   Temp:  [97.8 °F (36.6 °C)-98.3 °F (36.8 °C)] 98 °F (36.7 °C)  Heart Rate:  [66-90] 89  Resp:  [16-18] 18  BP: (168-177)/(66-78) 168/66  Flow (L/min) (Oxygen Therapy):  [45-50] 45     Physical Exam:  Constitutional: No acute distress, awake, alert  HENT: NCAT, mucous membranes moist  Respiratory: bilateral velcro-type sounds; no wheezing, remains on high flow and comfortable  Cardiovascular: RRR, + systolic murmur  Gastrointestinal: Positive bowel sounds, soft, nontender, nondistended  Musculoskeletal: No bilateral ankle edema  Psychiatric: Appropriate affect, cooperative  Neurologic: Oriented x 3, no focal deficits  Skin: No rashes      Results Reviewed:  LAB RESULTS:      Lab 25  0542 25  0429 25  0100 25  1007 25  1450   WBC 19.63*  --  21.10*  --   --    HEMOGLOBIN 11.1*  --  9.8*  --   --    HEMATOCRIT 36.8*  --  32.7*  --   --    PLATELETS 409  --  406  --   --    NEUTROS ABS 16.12*  --  19.57*  --   --    IMMATURE GRANS (ABS) 0.37*  --  0.34*  --   --    LYMPHS ABS 1.67  --  0.61*  --   --    MONOS ABS 1.38*  --  0.54  --   --    EOS ABS 0.04  --  0.00  --   --    MCV 86.2  --  85.6  --   --    PROCALCITONIN  --   --   --  0.72*  --    PROTIME  --  15.5*  --   --  10.8   APTT  --   --   --   --  30.6   HSTROP T  --  37* 34*  --   --          Lab 25  0542 25  0429 25  0100   SODIUM 143  --  138   POTASSIUM 4.7  --  4.6   CHLORIDE 110*  --  105   CO2 24.0  --  18.5*   ANION GAP 9.0  --  14.5   BUN 74.8*  --  75.5*   CREATININE 1.97*  --  2.89*   EGFR 35.0*  --  22.1*    GLUCOSE 116*  --  320*   CALCIUM 8.9  --  8.6   MAGNESIUM  --  2.3 2.2   HEMOGLOBIN A1C  --   --  7.33*   TSH  --  0.630  --          Lab 25  0100   TOTAL PROTEIN 6.7   ALBUMIN 2.8*   GLOBULIN 3.9   ALT (SGPT) 37   AST (SGOT) 21   BILIRUBIN <0.2   ALK PHOS 70         Lab 25  0429 25  0100 25  1450   PROBNP 4,325.0*  --   --    HSTROP T 37* 34*  --    PROTIME 15.5*  --  10.8   INR 1.16*  --  1.06                 Brief Urine Lab Results       None            Microbiology Results Abnormal       None            OUTSIDE NON-INVASIVE CARDIOLOGY STUDY  Result Date: 2025  This procedure was auto-finalized with no dictation required.    Duplex Carotid Ultrasound CAR  Result Date: 2025    Right internal carotid artery demonstrates a less than 50% stenosis.   Antegrade right vertebral flow.   Left internal carotid artery demonstrates a less than 50% stenosis.   Antegrade left vertebral flow.     XR Chest 1 View  Result Date: 2025  XR CHEST 1 VW Date of Exam: 2025 2:21 AM EDT Indication: hypoxia, CHF, ? PNA Comparison: None available. Findings: There are coarse interstitial opacities noted throughout both lungs. There is a small right-sided pleural effusion versus basilar scarring. Heart size is normal. No pneumothorax. No lobar consolidation. No acute osseous abnormality.     Impression: Impression: Interstitial disease in the lungs is favored to reflect chronic interstitial disease although atypical pneumonia and interstitial pulmonary edema could have a similar appearance. Stability is not assessed without comparison. There is also a small right-sided pleural effusion. Electronically Signed: Mahesh Novoa MD  2025 2:52 AM EDT  Workstation ID: [id number]    ECHO COMPLETE (DOPPLER / COLOR) W OR WO CONTRAST  Result Date: 2025  TRANSTHORACIC ECHOCARDIOGRAPHY REPORT  Demographics  Patient Name:            ERMA THOMPSON   :     1950  Medical Record Number:    [id number]                Age:     75 year(s)  Corporate ID Number:     [id number]                Gender   Male  Account Number:          [id number]  Sonographer:             Ger TREJO           Height:  71 inches  Referring Physician:                               Weight:  179 pounds  Interpreting Physician:  AILYN MANCINI MD         BMI:     24.97 kg/m^2  Date of Service:         06/30/2025  Room Number:             4004 Type of Study:  TTE procedure: ECHO COMPLETE (DOPPLER / COLOR) W OR WO CONTRAST. Measurements Summary:  LVEDd: 5.02 cm        LVESd: 3.63 cm           IVSEd: 1.12 cm  AO Root:2.9 cm        LVPWd: 1.24 cm  Left Ventricle  Peak E-wave: 1.13 m/s Peak A-wave: 1.21 m/s  E/A ratio: 0.93  E' Velocity: 0.06 m/s Volume [id number] ml  Volume [id number] ml  LVOT diameter: 2 cm  Normal sized left ventricle.  Concentric LVH: mild to moderate  Normal EF  Right Ventricle  Diastolic dimension: 3.07 cm  Normal sized right ventricle and function  Left Atrium  LA dimension: 3.8 cm                 LA/Aorta: 1.31  Left atrial enlargement mild  Right Atrium  Normal sized right atrium.  Mitral Valve  Deceleration time: 150 msec           Area PHT: 5 cm^2  P1/2t: 44 msec  MAC  Trivial to mild mitral regurgitration.  Difficult to exclude mild COLT, not definitive by any means  No mitral stenosis.  Aortic Valve  AV VTI: 69.1 cm     Area continuity: 1.34 Peak velocity: 3.03  LVOT VTI: 29.5 cm   cm^2                  m/s  Cusp separation:    Mean velocity: 2.13   Peak gradient: 36.72  0.8 cm              m/s                   mmHg  Mean gradient: 22  mmHg   Severe aortic valve stenosis  Mild aortic regurgitation  Sclerotic AV  Tricuspid Valve  Structurally normal tricuspid valve.  Trivial to mild tricuspid regurgitation  Pulmonic Valve  Grossly normal. Great Vessels Aorta  Aortic Root: 2.9 cm  LVOT Diameter: 2 cm Ao root normal size.  Pericardium / Pleura Trivial pericardial effusion  Other  Agitated saline study  demionstrates no right to left shunt  ----------------------------------------------------------------  Electronically signed by AILYN MANCINI MD(Interpreting  Physician) on 2025 18:01  ----------------------------------------------------------------    US renal complete bilateral  Result Date: 2025  RENAL ULTRASOUND HISTORY: Acute kidney injury. COMPARISON: None. PROCEDURE: Ultrasound images of the kidneys were obtained. FINDINGS:  Limited images of the liver parenchyma demonstrate normal echogenicity.   The right kidney measures 10.1 cm in length. It is normal echogenicity. There is no hydronephrosis. The left kidney measures 10.1 cm in length. It is normal echogenicity. There is no hydronephrosis.     Impression: Unremarkable renal ultrasound.  : 1950 Images reviewed, interpreted, and dictated by Sonam Nicole MD      Results for orders placed during the hospital encounter of 25    Adult Transthoracic Echo Complete W/ Cont if Necessary Per Protocol 2025  3:03 PM    Interpretation Summary    Left ventricular systolic function is normal. Calculated left ventricular EF = 61.5%    Left ventricular wall thickness is consistent with hypertrophy.    Left ventricular diastolic function was normal.    The left atrial cavity is dilated.    Left atrial volume is mildly increased.    Moderate aortic valve stenosis is present.    Aortic valve maximum pressure gradient is 38 mmHg. Aortic valve mean pressure gradient is 20 mmHg.    Systolic anterior motion of the anterior mitral leaflet is present.      Current medications:  Scheduled Meds:aspirin, 81 mg, Oral, Daily  budesonide-formoterol, 2 puff, Inhalation, BID - RT  cefepime, 2,000 mg, Intravenous, Q12H  [Held by provider] dilTIAZem CD, 120 mg, Oral, Q24H  ferrous sulfate, 325 mg, Oral, Daily With Breakfast  heparin (porcine), 5,000 Units, Subcutaneous, Q12H  insulin lispro, 2-7 Units, Subcutaneous, 4x Daily AC & at Bedtime  [Held by provider]  lisinopril, 10 mg, Oral, Q24H  metoprolol tartrate, 25 mg, Oral, Q12H  NIFEdipine XL, 30 mg, Oral, Daily  pantoprazole, 40 mg, Oral, Q AM  pravastatin, 20 mg, Oral, Nightly  sodium chloride, 10 mL, Intravenous, Q12H  [Held by provider] torsemide, 20 mg, Oral, Daily      Continuous Infusions:Pharmacy To Dose:,       PRN Meds:.  acetaminophen **OR** acetaminophen **OR** acetaminophen    albuterol    senna-docusate sodium **AND** polyethylene glycol **AND** bisacodyl **AND** bisacodyl    dextrose    dextrose    glucagon (human recombinant)    nitroglycerin    Pharmacy To Dose:    sodium chloride    sodium chloride    Assessment & Plan   Assessment & Plan     Active Hospital Problems    Diagnosis  POA    **Heart failure [I50.9]  Yes    Anemia [D64.9]  Unknown    CHF (congestive heart failure) [I50.9]  Unknown    Chronic kidney disease [N18.9]  Unknown    COPD (chronic obstructive pulmonary disease) [J44.9]  Unknown    Diabetes mellitus [E11.9]  Unknown    Dyslipidemia [E78.5]  Unknown    Hypertension [I10]  Unknown      Resolved Hospital Problems   No resolved problems to display.        Brief Hospital Course to date:  Baldemar Anderson is a 75 y.o. male with a past medical history of COPD, type 2 diabetes, hypertension, CKD, former tobacco use disorder, chronic anemia, and dyslipidemia. He was transferred from [facility] for evaluation of a TAVR.  CT surgery and cardiology follow        Acute respiratory failure with hypoxia  Acute on chronic diastolic heart failure  Hypertension  Moderate aortic stenosis  - Currently on high flow, and comfortable  - strict I&O's  - daily weights  - Repeat echo on 7/1 showed EF of 61.5%, left ventricular hypertrophy, normal diastolic function, moderate aortic valve stenosis.  Cardiology has reviewed and does not suspect that aortic stenosis is the full picture for his hypoxemia since the aortic valve opens adequately on echo.  Planning for heart cath tomorrow and  suggest pulmonary workup for pulmonary fibrosis prior to considering for valve surgery.  CT surgery following as well  --check VQ scan       COPD  Possible pneumonia  - continue DuoNebs and Pulmicort  - treated with steroids at OSH.  Steroids were DCd on 6/30  - symbicort  - continue cefepime for now  - BC x 2 in process  - sputum culture unremarkable  -pulm consult for w/u of possible underlying pulm fibrosis as driving force of hypoxemia, will order for AM        Acute on chronic renal failure, stage 3b.  - Baseline creatinine is 2.1  - Avoid nephrotoxic medications  - Monitor I's and O's and daily weights  - holding lisinopril, torsemide  --bc of heart cath tmrw will likely need fluids periprocedurally     Hyponatremia.-- resolved  - Sodium is 127 at OSH  - was given samsca at OSH  - Na 138 on arrival here and stable     Type 2 diabetes.  - FSBG with SSI  - Holding glimepiride  --A1c is 7.3     Chronic anemia.  - Hgn 9.8, Hct 32.7  - no overt signs of bleeding  - continue to monitor       Expected Discharge Location and Transportation: TBD  Expected Discharge   Expected Discharge Date: 7/7/2025; Expected Discharge Time:      VTE Prophylaxis:  Pharmacologic VTE prophylaxis orders are present.         AM-PAC 6 Clicks Score (PT): 20 (07/02/25 0050)    CODE STATUS:   Code Status and Medical Interventions: CPR (Attempt to Resuscitate); Full Support   Ordered at: 07/01/25 0243     Code Status (Patient has no pulse and is not breathing):    CPR (Attempt to Resuscitate)     Medical Interventions (Patient has pulse or is breathing):    Full Support     Level Of Support Discussed With:    Patient       Veronica Ramirez MD  07/02/25

## 2025-07-03 LAB
ANION GAP SERPL CALCULATED.3IONS-SCNC: 11 MMOL/L (ref 5–15)
BACTERIA SPEC RESP CULT: NORMAL
BASOPHILS # BLD AUTO: 0.03 10*3/MM3 (ref 0–0.2)
BASOPHILS NFR BLD AUTO: 0.2 % (ref 0–1.5)
BUN SERPL-MCNC: 82.4 MG/DL (ref 8–23)
BUN/CREAT SERPL: 39.6 (ref 7–25)
CALCIUM SPEC-SCNC: 8.9 MG/DL (ref 8.6–10.5)
CCP IGA+IGG SERPL IA-ACNC: 6 UNITS (ref 0–19)
CENTROMERE B AB SER-ACNC: <0.2 AI (ref 0–0.9)
CHLORIDE SERPL-SCNC: 108 MMOL/L (ref 98–107)
CHROMATIN AB SERPL-ACNC: <0.2 AI (ref 0–0.9)
CO2 SERPL-SCNC: 21 MMOL/L (ref 22–29)
CREAT SERPL-MCNC: 2.08 MG/DL (ref 0.76–1.27)
DEPRECATED RDW RBC AUTO: 57.5 FL (ref 37–54)
DSDNA AB SER-ACNC: <1 IU/ML (ref 0–9)
EGFRCR SERPLBLD CKD-EPI 2021: 32.8 ML/MIN/1.73
ENA JO1 AB SER-ACNC: <0.2 AI (ref 0–0.9)
ENA RNP AB SER-ACNC: 0.2 AI (ref 0–0.9)
ENA SCL70 AB SER-ACNC: <0.2 AI (ref 0–0.9)
ENA SM AB SER-ACNC: <0.2 AI (ref 0–0.9)
ENA SS-A AB SER-ACNC: <0.2 AI (ref 0–0.9)
ENA SS-B AB SER-ACNC: <0.2 AI (ref 0–0.9)
EOSINOPHIL # BLD AUTO: 0 10*3/MM3 (ref 0–0.4)
EOSINOPHIL NFR BLD AUTO: 0 % (ref 0.3–6.2)
ERYTHROCYTE [DISTWIDTH] IN BLOOD BY AUTOMATED COUNT: 19 % (ref 12.3–15.4)
GLUCOSE BLDC GLUCOMTR-MCNC: 181 MG/DL (ref 70–130)
GLUCOSE BLDC GLUCOMTR-MCNC: 249 MG/DL (ref 70–130)
GLUCOSE BLDC GLUCOMTR-MCNC: 283 MG/DL (ref 70–130)
GLUCOSE BLDC GLUCOMTR-MCNC: 295 MG/DL (ref 70–130)
GLUCOSE SERPL-MCNC: 166 MG/DL (ref 65–99)
GRAM STN SPEC: NORMAL
HCT VFR BLD AUTO: 36.1 % (ref 37.5–51)
HGB BLD-MCNC: 10.9 G/DL (ref 13–17.7)
IMM GRANULOCYTES # BLD AUTO: 0.27 10*3/MM3 (ref 0–0.05)
IMM GRANULOCYTES NFR BLD AUTO: 1.5 % (ref 0–0.5)
LYMPHOCYTES # BLD AUTO: 0.93 10*3/MM3 (ref 0.7–3.1)
LYMPHOCYTES NFR BLD AUTO: 5.2 % (ref 19.6–45.3)
Lab: NORMAL
MCH RBC QN AUTO: 25.5 PG (ref 26.6–33)
MCHC RBC AUTO-ENTMCNC: 30.2 G/DL (ref 31.5–35.7)
MCV RBC AUTO: 84.5 FL (ref 79–97)
MONOCYTES # BLD AUTO: 0.85 10*3/MM3 (ref 0.1–0.9)
MONOCYTES NFR BLD AUTO: 4.7 % (ref 5–12)
NEUTROPHILS NFR BLD AUTO: 15.89 10*3/MM3 (ref 1.7–7)
NEUTROPHILS NFR BLD AUTO: 88.4 % (ref 42.7–76)
NRBC BLD AUTO-RTO: 0 /100 WBC (ref 0–0.2)
NT-PROBNP SERPL-MCNC: 2989 PG/ML (ref 0–900)
PLATELET # BLD AUTO: 403 10*3/MM3 (ref 140–450)
PMV BLD AUTO: 9 FL (ref 6–12)
POTASSIUM SERPL-SCNC: 5 MMOL/L (ref 3.5–5.2)
RBC # BLD AUTO: 4.27 10*6/MM3 (ref 4.14–5.8)
SODIUM SERPL-SCNC: 140 MMOL/L (ref 136–145)
WBC NRBC COR # BLD AUTO: 17.97 10*3/MM3 (ref 3.4–10.8)

## 2025-07-03 PROCEDURE — 25010000002 CEFEPIME PER 500 MG: Performed by: INTERNAL MEDICINE

## 2025-07-03 PROCEDURE — 25010000002 METHYLPREDNISOLONE PER 125 MG: Performed by: INTERNAL MEDICINE

## 2025-07-03 PROCEDURE — 25010000002 FUROSEMIDE PER 20 MG: Performed by: NURSE PRACTITIONER

## 2025-07-03 PROCEDURE — 63710000001 INSULIN LISPRO (HUMAN) PER 5 UNITS: Performed by: INTERNAL MEDICINE

## 2025-07-03 PROCEDURE — 63710000001 INSULIN LISPRO (HUMAN) PER 5 UNITS: Performed by: NURSE PRACTITIONER

## 2025-07-03 PROCEDURE — 85025 COMPLETE CBC W/AUTO DIFF WBC: CPT | Performed by: STUDENT IN AN ORGANIZED HEALTH CARE EDUCATION/TRAINING PROGRAM

## 2025-07-03 PROCEDURE — 80048 BASIC METABOLIC PNL TOTAL CA: CPT | Performed by: NURSE PRACTITIONER

## 2025-07-03 PROCEDURE — 83880 ASSAY OF NATRIURETIC PEPTIDE: CPT | Performed by: NURSE PRACTITIONER

## 2025-07-03 PROCEDURE — 99232 SBSQ HOSP IP/OBS MODERATE 35: CPT | Performed by: NURSE PRACTITIONER

## 2025-07-03 PROCEDURE — 82948 REAGENT STRIP/BLOOD GLUCOSE: CPT

## 2025-07-03 PROCEDURE — 94799 UNLISTED PULMONARY SVC/PX: CPT

## 2025-07-03 PROCEDURE — 97162 PT EVAL MOD COMPLEX 30 MIN: CPT

## 2025-07-03 PROCEDURE — 99232 SBSQ HOSP IP/OBS MODERATE 35: CPT

## 2025-07-03 PROCEDURE — 99232 SBSQ HOSP IP/OBS MODERATE 35: CPT | Performed by: INTERNAL MEDICINE

## 2025-07-03 PROCEDURE — 25010000002 HEPARIN (PORCINE) PER 1000 UNITS: Performed by: NURSE PRACTITIONER

## 2025-07-03 PROCEDURE — 94761 N-INVAS EAR/PLS OXIMETRY MLT: CPT

## 2025-07-03 PROCEDURE — 63710000001 INSULIN GLARGINE PER 5 UNITS: Performed by: INTERNAL MEDICINE

## 2025-07-03 RX ORDER — TERAZOSIN 2 MG/1
2 CAPSULE ORAL NIGHTLY
Status: DISCONTINUED | OUTPATIENT
Start: 2025-07-03 | End: 2025-07-09 | Stop reason: HOSPADM

## 2025-07-03 RX ORDER — TEMAZEPAM 15 MG/1
15 CAPSULE ORAL NIGHTLY PRN
Status: DISCONTINUED | OUTPATIENT
Start: 2025-07-03 | End: 2025-07-08

## 2025-07-03 RX ORDER — FUROSEMIDE 10 MG/ML
40 INJECTION INTRAMUSCULAR; INTRAVENOUS ONCE
Status: COMPLETED | OUTPATIENT
Start: 2025-07-03 | End: 2025-07-03

## 2025-07-03 RX ORDER — CARVEDILOL 12.5 MG/1
12.5 TABLET ORAL 2 TIMES DAILY WITH MEALS
Status: DISCONTINUED | OUTPATIENT
Start: 2025-07-03 | End: 2025-07-09 | Stop reason: HOSPADM

## 2025-07-03 RX ORDER — INSULIN LISPRO 100 [IU]/ML
4 INJECTION, SOLUTION INTRAVENOUS; SUBCUTANEOUS
Status: DISCONTINUED | OUTPATIENT
Start: 2025-07-03 | End: 2025-07-05

## 2025-07-03 RX ADMIN — DILTIAZEM HYDROCHLORIDE 360 MG: 180 CAPSULE, EXTENDED RELEASE ORAL at 08:42

## 2025-07-03 RX ADMIN — METHYLPREDNISOLONE SODIUM SUCCINATE 60 MG: 125 INJECTION INTRAMUSCULAR; INTRAVENOUS at 11:13

## 2025-07-03 RX ADMIN — CARVEDILOL 12.5 MG: 12.5 TABLET, FILM COATED ORAL at 17:08

## 2025-07-03 RX ADMIN — CEFEPIME 2000 MG: 2 INJECTION, POWDER, FOR SOLUTION INTRAVENOUS at 08:43

## 2025-07-03 RX ADMIN — Medication 10 ML: at 20:53

## 2025-07-03 RX ADMIN — FUROSEMIDE 40 MG: 10 INJECTION, SOLUTION INTRAMUSCULAR; INTRAVENOUS at 12:47

## 2025-07-03 RX ADMIN — INSULIN LISPRO 3 UNITS: 100 INJECTION, SOLUTION INTRAVENOUS; SUBCUTANEOUS at 12:47

## 2025-07-03 RX ADMIN — INSULIN LISPRO 4 UNITS: 100 INJECTION, SOLUTION INTRAVENOUS; SUBCUTANEOUS at 12:47

## 2025-07-03 RX ADMIN — GUAIFENESIN 1200 MG: 600 TABLET, EXTENDED RELEASE ORAL at 08:42

## 2025-07-03 RX ADMIN — NIFEDIPINE 30 MG: 30 TABLET, EXTENDED RELEASE ORAL at 08:42

## 2025-07-03 RX ADMIN — HEPARIN SODIUM 5000 UNITS: 5000 INJECTION INTRAVENOUS; SUBCUTANEOUS at 20:52

## 2025-07-03 RX ADMIN — SODIUM CHLORIDE SOLN NEBU 7% 4 ML: 7 NEBU SOLN at 08:31

## 2025-07-03 RX ADMIN — CEFEPIME 2000 MG: 2 INJECTION, POWDER, FOR SOLUTION INTRAVENOUS at 20:52

## 2025-07-03 RX ADMIN — BUDESONIDE 0.5 MG: 0.5 SUSPENSION RESPIRATORY (INHALATION) at 08:31

## 2025-07-03 RX ADMIN — INSULIN LISPRO 2 UNITS: 100 INJECTION, SOLUTION INTRAVENOUS; SUBCUTANEOUS at 08:43

## 2025-07-03 RX ADMIN — INSULIN GLARGINE 10 UNITS: 100 INJECTION, SOLUTION SUBCUTANEOUS at 20:53

## 2025-07-03 RX ADMIN — BUDESONIDE 0.5 MG: 0.5 SUSPENSION RESPIRATORY (INHALATION) at 19:20

## 2025-07-03 RX ADMIN — ARFORMOTEROL TARTRATE 15 MCG: 15 SOLUTION RESPIRATORY (INHALATION) at 08:31

## 2025-07-03 RX ADMIN — ARFORMOTEROL TARTRATE 15 MCG: 15 SOLUTION RESPIRATORY (INHALATION) at 19:16

## 2025-07-03 RX ADMIN — HEPARIN SODIUM 5000 UNITS: 5000 INJECTION INTRAVENOUS; SUBCUTANEOUS at 08:43

## 2025-07-03 RX ADMIN — INSULIN LISPRO 4 UNITS: 100 INJECTION, SOLUTION INTRAVENOUS; SUBCUTANEOUS at 20:53

## 2025-07-03 RX ADMIN — FERROUS SULFATE TAB 325 MG (65 MG ELEMENTAL FE) 325 MG: 325 (65 FE) TAB at 08:42

## 2025-07-03 RX ADMIN — INSULIN LISPRO 4 UNITS: 100 INJECTION, SOLUTION INTRAVENOUS; SUBCUTANEOUS at 17:08

## 2025-07-03 RX ADMIN — Medication 10 ML: at 08:43

## 2025-07-03 RX ADMIN — PRAVASTATIN SODIUM 20 MG: 20 TABLET ORAL at 20:52

## 2025-07-03 RX ADMIN — PANTOPRAZOLE SODIUM 40 MG: 40 TABLET, DELAYED RELEASE ORAL at 05:25

## 2025-07-03 RX ADMIN — SODIUM CHLORIDE SOLN NEBU 7% 4 ML: 7 NEBU SOLN at 19:14

## 2025-07-03 RX ADMIN — METOPROLOL TARTRATE 25 MG: 25 TABLET, FILM COATED ORAL at 08:42

## 2025-07-03 RX ADMIN — ASPIRIN 81 MG: 81 TABLET, COATED ORAL at 08:42

## 2025-07-03 RX ADMIN — TERAZOSIN HYDROCHLORIDE 2 MG: 2 CAPSULE ORAL at 20:52

## 2025-07-03 RX ADMIN — GUAIFENESIN 1200 MG: 600 TABLET, EXTENDED RELEASE ORAL at 20:52

## 2025-07-03 NOTE — PLAN OF CARE
Goal Outcome Evaluation:  Plan of Care Reviewed With: patient           Outcome Evaluation: PT initial evaluation completed for pt presenting with generalized weakness, impaired balance, increased SOA, confusion, and decreased functional mobility. Pt ambulated 20ft +20ft with Rosi x2 and B UE support on tripod monitor. Pt's decreased independence warrants PT skilled care. Recommend D/C to IP rehab facility.    Anticipated Discharge Disposition (PT): inpatient rehabilitation facility                         no

## 2025-07-03 NOTE — CASE MANAGEMENT/SOCIAL WORK
Continued Stay Note   Talia     Patient Name: Baldemar Anderson  MRN: [id number]  Today's Date: 7/3/2025    Admit Date: 6/30/2025    Plan: Home with spouse   Discharge Plan       Row Name 07/03/25 0836       Plan    Plan Home with spouse    Patient/Family in Agreement with Plan yes    Plan Comments Spoke with patient at bedside. Patient is still on HFNC and has a heart cath planned for Monday. Plan is home with spouse. CM will continue to follow.    Final Discharge Disposition Code 01 - home or self-care                   Discharge Codes    No documentation.                 Expected Discharge Date and Time       Expected Discharge Date Expected Discharge Time    Jul 7, 2025               Christian Calhoun RN

## 2025-07-03 NOTE — PLAN OF CARE
Problem: Adult Inpatient Plan of Care  Goal: Plan of Care Review  Outcome: Progressing  Goal: Patient-Specific Goal (Individualized)  Outcome: Progressing  Goal: Absence of Hospital-Acquired Illness or Injury  Outcome: Progressing  Intervention: Identify and Manage Fall Risk  Recent Flowsheet Documentation  Taken 7/3/2025 0400 by Adam Garcia RN  Safety Promotion/Fall Prevention:   clutter free environment maintained   mobility aid in reach   nonskid shoes/slippers when out of bed   room organization consistent   safety round/check completed  Taken 7/3/2025 0200 by Adam Garcia RN  Safety Promotion/Fall Prevention:   clutter free environment maintained   mobility aid in reach   nonskid shoes/slippers when out of bed   room organization consistent   safety round/check completed  Taken 7/3/2025 0000 by Adam Garcia RN  Safety Promotion/Fall Prevention:   clutter free environment maintained   mobility aid in reach   nonskid shoes/slippers when out of bed   room organization consistent   safety round/check completed  Taken 7/2/2025 2200 by Adam Garcia RN  Safety Promotion/Fall Prevention:   clutter free environment maintained   mobility aid in reach   nonskid shoes/slippers when out of bed   room organization consistent   safety round/check completed  Taken 7/2/2025 2000 by Adam Garcia RN  Safety Promotion/Fall Prevention:   clutter free environment maintained   mobility aid in reach   nonskid shoes/slippers when out of bed   room organization consistent   safety round/check completed  Intervention: Prevent Skin Injury  Recent Flowsheet Documentation  Taken 7/3/2025 0400 by Adam Garcia RN  Skin Protection:   hydrocolloids used   incontinence pads utilized   pulse oximeter probe site changed   silicone foam dressing in place   skin sealant/moisture barrier applied  Taken 7/3/2025 0000 by Adam Garcia RN  Skin Protection:   hydrocolloids used   incontinence pads utilized   pulse oximeter probe site changed    silicone foam dressing in place   skin sealant/moisture barrier applied  Taken 7/2/2025 2000 by Adam Garcia RN  Skin Protection:   hydrocolloids used   incontinence pads utilized   pulse oximeter probe site changed   silicone foam dressing in place   skin sealant/moisture barrier applied  Intervention: Prevent Infection  Recent Flowsheet Documentation  Taken 7/3/2025 0400 by Adam Garcia RN  Infection Prevention:   hand hygiene promoted   single patient room provided  Taken 7/3/2025 0200 by Adam Garcia RN  Infection Prevention:   hand hygiene promoted   single patient room provided  Taken 7/3/2025 0000 by Adma Garcia RN  Infection Prevention:   hand hygiene promoted   single patient room provided  Taken 7/2/2025 2200 by Adam Garcia RN  Infection Prevention:   hand hygiene promoted   single patient room provided  Taken 7/2/2025 2000 by Adam Garcia RN  Infection Prevention:   hand hygiene promoted   single patient room provided  Goal: Optimal Comfort and Wellbeing  Outcome: Progressing  Intervention: Provide Person-Centered Care  Recent Flowsheet Documentation  Taken 7/3/2025 0400 by Adam Garcia RN  Trust Relationship/Rapport: care explained  Taken 7/3/2025 0200 by Adam Garcia RN  Trust Relationship/Rapport: care explained  Taken 7/3/2025 0000 by Adam Garcia RN  Trust Relationship/Rapport: care explained  Taken 7/2/2025 2000 by Adam Garcia RN  Trust Relationship/Rapport: care explained  Goal: Readiness for Transition of Care  Outcome: Progressing     Problem: Comorbidity Management  Goal: Maintenance of COPD Symptom Control  Outcome: Progressing  Intervention: Maintain COPD (Chronic Obstructive Pulmonary Disease) Symptom Control  Recent Flowsheet Documentation  Taken 7/3/2025 0400 by Adam Garcia RN  Medication Review/Management: medications reviewed  Taken 7/3/2025 0000 by Adam Garcia RN  Medication Review/Management: medications reviewed  Taken 7/2/2025 2000 by Adam Garcia RN  Medication  Review/Management: medications reviewed  Goal: Maintenance of Heart Failure Symptom Control  Outcome: Progressing  Intervention: Maintain Heart Failure Management  Recent Flowsheet Documentation  Taken 7/3/2025 0400 by Adam Garcia RN  Medication Review/Management: medications reviewed  Taken 7/3/2025 0000 by Adam Garcia RN  Medication Review/Management: medications reviewed  Taken 7/2/2025 2000 by Adam Garcia RN  Medication Review/Management: medications reviewed  Goal: Blood Pressure in Desired Range  Outcome: Progressing  Intervention: Maintain Blood Pressure Management  Recent Flowsheet Documentation  Taken 7/3/2025 0400 by Adam Garcia RN  Medication Review/Management: medications reviewed  Taken 7/3/2025 0000 by Adam Garcia RN  Medication Review/Management: medications reviewed  Taken 7/2/2025 2000 by Adam Garcia RN  Medication Review/Management: medications reviewed     Problem: Skin Injury Risk Increased  Goal: Skin Health and Integrity  Outcome: Progressing  Intervention: Optimize Skin Protection  Recent Flowsheet Documentation  Taken 7/3/2025 0400 by Adam Garcia RN  Activity Management: activity encouraged  Pressure Reduction Techniques:   heels elevated off bed   pressure points protected   weight shift assistance provided  Pressure Reduction Devices:   heel offloading device utilized   foam padding utilized   positioning supports utilized   pressure-redistributing mattress utilized  Skin Protection:   hydrocolloids used   incontinence pads utilized   pulse oximeter probe site changed   silicone foam dressing in place   skin sealant/moisture barrier applied  Taken 7/3/2025 0200 by Adam Garcia RN  Activity Management: activity encouraged  Taken 7/3/2025 0000 by Adam Garcia RN  Activity Management: activity encouraged  Pressure Reduction Techniques:   heels elevated off bed   pressure points protected   weight shift assistance provided  Pressure Reduction Devices:   heel offloading device  utilized   foam padding utilized   positioning supports utilized   pressure-redistributing mattress utilized  Skin Protection:   hydrocolloids used   incontinence pads utilized   pulse oximeter probe site changed   silicone foam dressing in place   skin sealant/moisture barrier applied  Taken 7/2/2025 2200 by Adam Garcia RN  Activity Management: activity encouraged  Taken 7/2/2025 2000 by Adam Garcia RN  Activity Management: activity encouraged  Pressure Reduction Techniques:   heels elevated off bed   pressure points protected   weight shift assistance provided  Pressure Reduction Devices:   heel offloading device utilized   foam padding utilized   positioning supports utilized   pressure-redistributing mattress utilized  Skin Protection:   hydrocolloids used   incontinence pads utilized   pulse oximeter probe site changed   silicone foam dressing in place   skin sealant/moisture barrier applied     Problem: Fall Injury Risk  Goal: Absence of Fall and Fall-Related Injury  Outcome: Progressing  Intervention: Identify and Manage Contributors  Recent Flowsheet Documentation  Taken 7/3/2025 0400 by Adam Garcia RN  Medication Review/Management: medications reviewed  Taken 7/3/2025 0000 by Adam Garcia RN  Medication Review/Management: medications reviewed  Taken 7/2/2025 2000 by Adam Garcia RN  Medication Review/Management: medications reviewed  Intervention: Promote Injury-Free Environment  Recent Flowsheet Documentation  Taken 7/3/2025 0400 by Adam Garcia RN  Safety Promotion/Fall Prevention:   clutter free environment maintained   mobility aid in reach   nonskid shoes/slippers when out of bed   room organization consistent   safety round/check completed  Taken 7/3/2025 0200 by Adam Garcia RN  Safety Promotion/Fall Prevention:   clutter free environment maintained   mobility aid in reach   nonskid shoes/slippers when out of bed   room organization consistent   safety round/check completed  Taken 7/3/2025  0000 by Adam Garcia, RN  Safety Promotion/Fall Prevention:   clutter free environment maintained   mobility aid in reach   nonskid shoes/slippers when out of bed   room organization consistent   safety round/check completed  Taken 7/2/2025 2200 by Adam Garcia, TORRI  Safety Promotion/Fall Prevention:   clutter free environment maintained   mobility aid in reach   nonskid shoes/slippers when out of bed   room organization consistent   safety round/check completed  Taken 7/2/2025 2000 by Adam Garcia, RN  Safety Promotion/Fall Prevention:   clutter free environment maintained   mobility aid in reach   nonskid shoes/slippers when out of bed   room organization consistent   safety round/check completed   Goal Outcome Evaluation:

## 2025-07-03 NOTE — PROGRESS NOTES
Intensive Care Follow-up     Hospital:  LOS: 2 days   Mr. Baldemar Anderson, 75 y.o. male is followed for:   Acute diastolic heart failure        History of present illness:   75-year-old male with a past medical history significant for hypertension, hyperlipidemia, CHF, diabetes mellitus type 2, anemia, COPD and tobacco abuse. Patient was transferred to [facility] for T[facility]R evaluation currently on high flow nasal cannula. Pulmonary was consulted due to concern for possible pulmonary fibrosis. Patient has shortness of breath. Has been diuresed feels better than on admission. Still on high flow nasal cannula. Denies any chest pain, nausea, fever, or chills.       Subjective   Interval History:  Patient doing well shortness of breath stable, intermittently on and off high flow nasal cannula.             The patient's past medical, surgical and social history were reviewed and updated in Epic as appropriate.       Objective     Infusions:  Pharmacy To Dose:,       Medications:  arformoterol, 15 mcg, Nebulization, BID - RT  aspirin, 81 mg, Oral, Daily  budesonide, 0.5 mg, Nebulization, BID - RT  cefepime, 2,000 mg, Intravenous, Q12H  dilTIAZem CD, 360 mg, Oral, Q24H  ferrous sulfate, 325 mg, Oral, Daily With Breakfast  guaiFENesin, 1,200 mg, Oral, Q12H  heparin (porcine), 5,000 Units, Subcutaneous, Q12H  insulin lispro, 2-7 Units, Subcutaneous, 4x Daily AC & at Bedtime  methylPREDNISolone sodium succinate, 60 mg, Intravenous, Q24H  metoprolol tartrate, 25 mg, Oral, Q12H  NIFEdipine XL, 30 mg, Oral, Daily  pantoprazole, 40 mg, Oral, Q AM  pravastatin, 20 mg, Oral, Nightly  sodium chloride, 10 mL, Intravenous, Q12H  sodium chloride, 4 mL, Nebulization, BID - RT  [Held by provider] torsemide, 20 mg, Oral, Daily      I reviewed the patient's medications.    Vital Sign Min/Max for last 24 hours  Temp  Min: 97.6 °F (36.4 °C)  Max: 98.3 °F (36.8 °C)   BP  Min: 148/79  Max: 174/72   Pulse  Min: 64  Max: 87   Resp   Min: 18  Max: 20   SpO2  Min: 93 %  Max: 100 %   Flow (L/min) (Oxygen Therapy)  Min: 4  Max: 45       Input/Output for last 24 hour shift  07/02 0701 - 07/03 0700  In: 600 [P.O.:600]  Out: 950 [Urine:950]      GENERAL : NAD, conversant  RESPIRATORY/THORAX : normal respiratory effort and no intercostal retractions, Coarse crackles bilaterally  CARDIOVASCULAR : Normal S1/S2, RRR. 1+ lower ext edema.  GASTROINTESTINAL : Soft, NT/ND. BS x 4 normoactive. No hepatosplenomegaly.  MUSCULOSKELETAL : No cyanosis, clubbing, or ischemia  NEUROLOGICAL: alert and oriented to person, place and time  PSYCHOLOGICAL : Appropriate affect      Results from last 7 days   Lab Units 07/03/25  0644 07/02/25  0542 07/01/25  0100   WBC 10*3/mm3 17.97* 19.63* 21.10*   HEMOGLOBIN g/dL 10.9* 11.1* 9.8*   PLATELETS 10*3/mm3 403 409 406     Results from last 7 days   Lab Units 07/03/25  0644 07/02/25  0542 07/01/25  0429 07/01/25  0100   SODIUM mmol/L 140 143  --  138   POTASSIUM mmol/L 5.0 4.7  --  4.6   CO2 mmol/L 21.0* 24.0  --  18.5*   BUN mg/dL 82.4* 74.8*  --  75.5*   CREATININE mg/dL 2.08* 1.97*  --  2.89*   MAGNESIUM mg/dL  --   --  2.3 2.2   GLUCOSE mg/dL 166* 116*  --  320*     Estimated Creatinine Clearance: 35.7 mL/min (A) (by C-G formula based on SCr of 2.08 mg/dL (H)).          I reviewed the patient's new clinical results.  I reviewed the patient's new imaging results/reports including actual images and agree with reports.       Imaging Results (Last 24 Hours)       Procedure Component Value Units Date/Time    CT Chest Hi Resolution Diagnostic [[id number]] Collected: 07/02/25 1611     Updated: 07/02/25 1623    Narrative:      CT CHEST HI RESOLUTION DIAGNOSTIC    Date of Exam: 7/2/2025 3:48 PM EDT    Indication: IPF.    Comparison: Chest radiograph 7/2/2025. No prior chest CT scan    Technique: CT scan of the chest with high-resolution protocol was performed without contrast administration.  Automated exposure control and iterative  construction methods were used.      Findings:  There is advanced bullous change throughout the lungs, severe in the lung apices. There is a small to moderate free-flowing right pleural effusion and minimal left effusion. There is relatively mild discoid atelectasis of the posterior right lower lobe   associated with effusion which largely clears on prone imaging.     Scattered areas of finely reticular interstitial change in the lungs are generally associated with bullous disease, and not confined to the periphery, not particularly suggesting interstitial septal thickening associated with IPF. Appearance is more   suggestive of mild active inflammation or residual postinflammatory scarring superimposed on a background of advanced emphysema.     Lower lungs suggest there may be a component of mild residual interstitial edema as well. No airspace opacity particularly suspicious for a lobar pneumonia is seen. Images of the mediastinum show shotty normal-sized nodes and no evidence of adenopathy,   dominant mass or pericardial effusion. There is dense coronary artery calcification.    Images of the upper abdomen show mild fatty liver change, and previous cholecystectomy. Calcification of the left renal hilum image 113 is thought to be a vascular calcification, rather than in the renal collecting system. Bony structures appear grossly   intact.      Impression:      Impression:    1. Small to moderate free-flowing right pleural effusion, associated mild discoid atelectasis, and trace left pleural effusion. Mild diffuse basilar interstitial lung changes that may represent some residual interstitial edema.    2. Advanced changes of emphysema elsewhere, and patchy reticular interstitial lung disease in a nonspecific pattern, much of which may be related to patient's emphysema. Please see above discussion        Electronically Signed: Kenny Aguilar MD    7/2/2025 4:20 PM EDT    Workstation ID: [id number]    NM Lung Ventilation  Perfusion [[id number]] Collected: 07/02/25 1326     Updated: 07/02/25 1342    Narrative:      DATE OF EXAM: 7/2/2025 12:19 PM EDT    PROCEDURE: NM LUNG VENTILATION PERFUSION    INDICATIONS: hypoxemic respiratory failure, GERARD on CKD    COMPARISON: Chest radiographs from 7/2/2025 and 7/1/2025    TECHNIQUE: The patient was ventilated with 30.0 mCi of technetium 99m pertechnetate aerosol and ventilation images were acquired in multiple obliquities. The patient then received 5.48 mCi of technetium 99m MAA intravenously and perfusion images were   acquired in multiple obliquities.    FINDINGS:  Perfusion exam is heterogeneous, but without well-defined segmental, subsegmental, or even smaller peripheral defects. Ventilation portion of the exam is highly irregular, but in a generalized pattern, as may be seen with multifocal airspace disease,   severe bullous change, etc. Overall, the pattern appears to reflect diffuse pulmonary parenchymal disease such as pneumonia or pulmonary edema, rather than pulmonary embolic disease.      Impression:      Asymmetric VQ findings, with much more abnormal diffuse ventilation pattern. Heterogeneous perfusion pattern with no definable defects. Findings are considered low to intermediate probability for pulmonary embolus, but are more likely to represent   pneumonia or pulmonary edema.      Electronically Signed: Kenny Aguilar MD    7/2/2025 1:39 PM EDT    Workstation ID: [id number]    XR Chest PA & Lateral [[id number]] Collected: 07/02/25 1321     Updated: 07/02/25 1331    Narrative:      XR CHEST PA AND LATERAL    Date of Exam: 7/2/2025 1:04 PM EDT    Indication: for VQ scan    Comparison: 7/1/2025 chest radiograph    Findings:  Diffuse interstitial disease seen on yesterday's study is improving, now mostly seen in the lung bases, and right lateral midlung. Findings may reflect resolving interstitial edema. There are very small pleural effusions. No densely consolidated lung or   pneumothorax  is seen. Heart is enlarged. Vasculature is now upper limits of normal.      Impression:      Impression:  Chest x-ray appearance consistent with improving pulmonary edema. Small remaining pleural effusions.      Electronically Signed: Kenny Aguilar MD    7/2/2025 1:28 PM EDT    Workstation ID: [id number]            Assessment & Plan   Impression        Acute diastolic heart failure    Anemia    CHF (congestive heart failure)    Chronic kidney disease    COPD (chronic obstructive pulmonary disease)    Diabetes mellitus    Dyslipidemia    Hypertension    Acute respiratory failure with hypoxia       Plan        -I reviewed his CT scan, I actually think the more likely scenario is that he has underlying emphysema and the pulmonary edema is causing him to develop interstitial changes.  Is difficult to 100% tell and ideally once he was in a euvolemic state from a cardiac standpoint we can reevaluate in the future.  - Continue Solu-Medrol 60 mg daily, continue with diuretics to maintain a negative fluid balance.  Mainly this is being driven by cardiology.  - For COPD Brovana and budesonide nebs with albuterol as needed  -Continue antibiotics for possible pneumonia, consider 7 days of therapy  - Continue airway clearance regimen  - Follow-up ILD labs  - Wean oxygen to saturation greater than 88%      Francois Tong, DO  Pulmonary, Critical care and Sleep Medicine

## 2025-07-03 NOTE — THERAPY EVALUATION
Patient Name: Baldemar Anderson  : 1950    MRN: [id number]                              Today's Date: 7/3/2025       Admit Date: 2025    Visit Dx: No diagnosis found.  Patient Active Problem List   Diagnosis    Anemia    CHF (congestive heart failure)    Chronic kidney disease    COPD (chronic obstructive pulmonary disease)    Diabetes mellitus    Dyslipidemia    Hypertension    Acute diastolic heart failure    Acute respiratory failure with hypoxia     Past Medical History:   Diagnosis Date    Anemia     CHF (congestive heart failure)     Chronic kidney disease     COPD (chronic obstructive pulmonary disease)     Diabetes mellitus     Dyslipidemia     Hypertension      Past Surgical History:   Procedure Laterality Date    BRONCHOSCOPY      CHOLECYSTECTOMY        General Information       Row Name 25 151          Physical Therapy Time and Intention    Document Type evaluation  -KG     Mode of Treatment physical therapy  -KG       Row Name 25 151          General Information    Patient Profile Reviewed yes  -KG     Prior Level of Function independent:;all household mobility;gait;transfer;min assist:;ADL's;dressing;bathing  -KG     Existing Precautions/Restrictions cardiac;fall;oxygen therapy device and L/min;other (see comments)  confusion  -KG     Barriers to Rehab medically complex;cognitive status  -KG       Row Name 25 151          Living Environment    Current Living Arrangements home  -KG     People in Home spouse;child(kristine), adult  -KG       Row Name 25 151          Home Main Entrance    Number of Stairs, Main Entrance none  -KG       Row Name 25 151          Stairs Within Home, Primary    Number of Stairs, Within Home, Primary two  -KG     Stair Railings, Within Home, Primary railings on both sides of stairs  -KG       Row Name 25 151          Cognition    Orientation Status (Cognition) oriented to;person;place  -KG       Row Name 25 151           Safety Issues/Impairments Affecting Functional Mobility    Safety Issues Affecting Function (Mobility) awareness of need for assistance;insight into deficits/self-awareness;judgment;safety precaution awareness;safety precautions follow-through/compliance;sequencing abilities  -KG     Impairments Affecting Function (Mobility) balance;cognition;coordination;endurance/activity tolerance;postural/trunk control;shortness of breath;strength  -KG     Cognitive Impairments, Mobility Safety/Performance attention;awareness, need for assistance;insight into deficits/self-awareness;judgment;safety precaution awareness;safety precaution follow-through;sequencing abilities  -KG               User Key  (r) = Recorded By, (t) = Taken By, (c) = Cosigned By      Initials Name Provider Type    KG Di Dale, PT Physical Therapist                   Mobility       Row Name 07/03/25 1521          Bed Mobility    Comment, (Bed Mobility) UIC  -KG       Row Name 07/03/25 1521          Transfers    Comment, (Transfers) VC's for sequencing and increased safety awareness.  -KG       Row Name 07/03/25 1521          Sit-Stand Transfer    Sit-Stand [place] (Transfers) contact guard;verbal cues  -KG     Assistive Device (Sit-Stand Transfers) other (see comments)  B UE support  -KG       Row Name 07/03/25 1521          Gait/Stairs (Locomotion)    [place] Level (Gait) minimum assist (75% patient effort);2 person assist;verbal cues  -KG     Assistive Device (Gait) other (see comments)  B UE support on tripod monitor  -KG     Distance in Feet (Gait) 20  +20  -KG     Deviations/Abnormal Patterns (Gait) bilateral deviations;linn decreased;stride length decreased  -KG     Bilateral Gait Deviations forward flexed posture;heel strike decreased  -KG     Comment, (Gait/Stairs) Pt demonstrated step through gait pattern with slow linn and decreased step length. VC's for upright posture with forward gaze. Cues for proper breathing  technique. Pt demonstrated mild instability, but no overt LOB. Required one standing rest break. Limited by increased SOA and generalized weakness.  -KG               User Key  (r) = Recorded By, (t) = Taken By, (c) = Cosigned By      Initials Name Provider Type    Di Espinoza PT Physical Therapist                   Obj/Interventions       [place] [facility] Name 07/03/25 1523          Range of Motion Comprehensive    General Range of Motion no range of motion deficits identified  -KG     Comment, General Range of Motion B LE WFL  -KG       [place] [facility] Name 07/03/25 1523          Strength Comprehensive (MMT)    Comment, General Manual Muscle Testing (MMT) Assessment B LE grossly 3+/5  -KG       [place] [facility] Name 07/03/25 1523          Balance    Balance Assessment sitting static balance;standing static balance;standing dynamic balance  -KG     Static Sitting Balance standby assist  -KG     Position, Sitting Balance unsupported;sitting in chair  -KG     Static Standing Balance minimal assist  -KG     Dynamic Standing Balance minimal assist;2-person assist  -KG     Position/Device Used, Standing Balance supported  -KG       Row Name 07/03/25 1523          Sensory Assessment (Somatosensory)    Sensory Assessment (Somatosensory) LE sensation intact  -KG               User Key  (r) = Recorded By, (t) = Taken By, (c) = Cosigned By      Initials Name Provider Type    Di Espinoza, PT Physical Therapist                   Goals/Plan       Row Name 07/03/25 1526          Bed Mobility Goal 1 (PT)    Activity/Assistive Device (Bed Mobility Goal 1, PT) sit to supine;supine to sit  -KG     [place] Level/Cues Needed (Bed Mobility Goal 1, PT) contact guard required  -KG     Time Frame (Bed Mobility Goal 1, PT) short term goal (STG);3 days  -KG     Progress/Outcomes (Bed Mobility Goal 1, PT) goal ongoing  -KG       Row Name 07/03/25 1526          Transfer Goal 1 (PT)    Activity/Assistive Device (Transfer Goal 1, PT)  sit-to-stand/stand-to-sit;bed-to-chair/chair-to-bed;walker, rolling  -KG     [place] Level/Cues Needed (Transfer Goal 1, PT) standby assist  -KG     Time Frame (Transfer Goal 1, PT) long term goal (LTG);1 week  -KG     Progress/Outcome (Transfer Goal 1, PT) goal ongoing  -KG       Row Name 07/03/25 1526          Gait Training Goal 1 (PT)    Activity/Assistive Device (Gait Training Goal 1, PT) gait (walking locomotion);assistive device use;walker, rolling  -KG     [place] Level (Gait Training Goal 1, PT) contact guard required  -KG     Distance (Gait Training Goal 1, PT) 75 feet  -KG     Time Frame (Gait Training Goal 1, PT) long term goal (LTG);1 week  -KG     Progress/Outcome (Gait Training Goal 1, PT) goal ongoing  -KG       Row Name 07/03/25 1526          Therapy Assessment/Plan (PT)    Planned Therapy Interventions (PT) balance training;bed mobility training;gait training;strengthening;transfer training  -KG               User Key  (r) = Recorded By, (t) = Taken By, (c) = Cosigned By      Initials Name Provider Type    KG Di Dale N, PT Physical Therapist                   Clinical Impression       Row Name 07/03/25 1524          Pain    Pretreatment Pain Rating 0/10 - no pain  -KG     Posttreatment Pain Rating 0/10 - no pain  -KG       Row Name 07/03/25 1524          Plan of Care Review    Plan of Care Reviewed With patient  -KG     Outcome Evaluation PT initial evaluation completed for pt presenting with generalized weakness, impaired balance, increased SOA, confusion, and decreased functional mobility. Pt ambulated 20ft +20ft with Rosi x2 and B UE support on tripod monitor. Pt's decreased independence warrants PT skilled care. Recommend D/C to  rehab facility.  -KG       Row Name 07/03/25 1524          Therapy Assessment/Plan (PT)    Patient/Family Therapy Goals Statement (PT) return to PLOF  -KG     Rehab Potential (PT) good  -KG     Criteria for Skilled Interventions Met (PT)  yes;skilled treatment is necessary  -KG     Therapy Frequency (PT) daily  -KG     Predicted Duration of Therapy Intervention (PT) 7 days  -KG       Row Name 07/03/25 1524          Vital Signs    Pre Systolic BP Rehab 164  -KG     Pre Treatment Diastolic BP 77  -KG     Pretreatment Heart Rate (beats/min) 65  -KG     Posttreatment Heart Rate (beats/min) 68  -KG     Pre SpO2 (%) 100  -KG     O2 Delivery Pre Treatment supplemental O2  -KG     Intra SpO2 (%) 83  -KG     O2 Delivery Intra Treatment supplemental O2  -KG     Post SpO2 (%) 95  -KG     O2 Delivery Post Treatment supplemental O2  -KG     Pre Patient Position Sitting  -KG     Intra Patient Position Standing  -KG     Post Patient Position Sitting  -KG       Row Name 07/03/25 1524          Positioning and Restraints    Pre-Treatment Position sitting in chair/recliner  -KG     Post Treatment Position chair  -KG     In Chair notified nsg;reclined;call light within reach;encouraged to call for assist;exit alarm on;with family/caregiver;RUE elevated;LUE elevated;legs elevated  -KG               User Key  (r) = Recorded By, (t) = Taken By, (c) = Cosigned By      Initials Name Provider Type    KG Di Dale N, PT Physical Therapist                   Outcome Measures       Row Name 07/03/25 1526          How much help from another person do you currently need...    Turning from your back to your side while in flat bed without using bedrails? 3  -KG     Moving from lying on back to sitting on the side of a flat bed without bedrails? 3  -KG     Moving to and from a bed to a chair (including a wheelchair)? 3  -KG     Standing up from a chair using your arms (e.g., wheelchair, bedside chair)? 3  -KG     Climbing 3-5 steps with a railing? 2  -KG     To walk in hospital room? 3  -KG     AM-PAC 6 Clicks Score (PT) 17  -KG     Highest Level of Mobility Goal Stand (1 or More Minutes)-5  -KG       Row Name 07/03/25 1526          Functional Assessment    Outcome Measure  Options AM-PAC 6 Clicks Basic Mobility (PT)  -KG               User Key  (r) = Recorded By, (t) = Taken By, (c) = Cosigned By      Initials Name Provider Type    KG Di Dale PT Physical Therapist                                 Physical Therapy Education       Title: PT OT SLP Therapies (In Progress)       Topic: Physical Therapy (In Progress)       Point: Mobility training (In Progress)       Learning Progress Summary            Patient Acceptance, E, NR by KG at 7/3/2025 1422                      Point: Home exercise program (Not Started)       Learner Progress:  Not documented in this visit.              Point: Body mechanics (In Progress)       Learning Progress Summary            Patient Acceptance, E, NR by KG at 7/3/2025 1422                      Point: Precautions (In Progress)       Learning Progress Summary            Patient Acceptance, E, NR by KG at 7/3/2025 1422                                      User Key       Initials Effective Dates Name Provider Type Discipline    BOB 05/22/20 -  Di Dale PT Physical Therapist PT                  PT Recommendation and Plan  Planned Therapy Interventions (PT): balance training, bed mobility training, gait training, strengthening, transfer training  Outcome Evaluation: PT initial evaluation completed for pt presenting with generalized weakness, impaired balance, increased SOA, confusion, and decreased functional mobility. Pt ambulated 20ft +20ft with Rosi x2 and B UE support on tripod monitor. Pt's decreased independence warrants PT skilled care. Recommend D/C to IP rehab facility.     Time Calculation:   PT Evaluation Complexity  History, PT Evaluation Complexity: 3 or more personal factors and/or comorbidities  Examination of Body Systems (PT Eval Complexity): total of 3 or more elements  Clinical Presentation (PT Evaluation Complexity): evolving  Clinical Decision Making (PT Evaluation Complexity): moderate complexity  Overall  Complexity (PT Evaluation Complexity): moderate complexity     PT Charges       Row Name 07/03/25 1422             Time Calculation    Start Time 1422  -KG      PT Received On 07/03/25  -KG      PT Goal Re-Cert Due Date 07/13/25  -KG         Untimed Charges    PT Eval/Re-eval Minutes 51  -KG         Total Minutes    Untimed Charges Total Minutes 51  -KG       Total Minutes 51  -KG                User Key  (r) = Recorded By, (t) = Taken By, (c) = Cosigned By      Initials Name Provider Type    KG Di Dale, PT Physical Therapist                  Therapy Charges for Today       Code Description Service Date Service Provider Modifiers Qty    28684154305 HC PT EVAL MOD COMPLEXITY 4 7/3/2025 Di Dale, PT GP 1    03352393814 HC PT THER SUPP EA 15 MIN 7/3/2025 Di Dale, PT GP 3            PT G-Codes  Outcome Measure Options: AM-PAC 6 Clicks Basic Mobility (PT)  AM-PAC 6 Clicks Score (PT): 17  PT Discharge Summary  Anticipated Discharge Disposition (PT): inpatient rehabilitation facility    Jaja Dale, PT  7/3/2025

## 2025-07-03 NOTE — PROGRESS NOTES
[facility] Medicine Services  PROGRESS NOTE    Patient Name: Baldemar Anderson  : 1950  MRN: [id number]    Date of Admission: 2025  Primary Care Physician: Aparna Mendoza MD    Subjective   Subjective     CC:  F/u hypoxemia    HPI:  No new issues overnight.  He is up in the chair bu overall still weak and emotionally labile      Objective   Objective     Vital Signs:   Temp:  [97.6 °F (36.4 °C)-98.3 °F (36.8 °C)] 98.3 °F (36.8 °C)  Heart Rate:  [64-91] 70  Resp:  [18-20] 20  BP: (148-174)/(72-84) 150/74  Flow (L/min) (Oxygen Therapy):  [4-45] 45     Physical Exam:  Constitutional: No acute distress, awake, alert, crying intermittently  HENT: NCAT, mucous membranes moist  Respiratory: bilateral velcro-type sounds; no wheezing, remains on high flow and comfortable  Cardiovascular: RRR, + systolic murmur  Gastrointestinal: Positive bowel sounds, soft, nontender, nondistended  Musculoskeletal: No bilateral ankle edema  Psychiatric: Appropriate affect, cooperative  Neurologic: Oriented x 3, no focal deficits  Skin: No rashes      Results Reviewed:  LAB RESULTS:      Lab 25  0644 25  1224 25  1148 25  0542 25  0429 25  0100 25  1007 25  1450   WBC 17.97*  --   --  19.63*  --  21.10*  --   --    HEMOGLOBIN 10.9*  --   --  11.1*  --  9.8*  --   --    HEMATOCRIT 36.1*  --   --  36.8*  --  32.7*  --   --    PLATELETS 403  --   --  409  --  406  --   --    NEUTROS ABS 15.89*  --   --  16.12*  --  19.57*  --   --    IMMATURE GRANS (ABS) 0.27*  --   --  0.37*  --  0.34*  --   --    LYMPHS ABS 0.93  --   --  1.67  --  0.61*  --   --    MONOS ABS 0.85  --   --  1.38*  --  0.54  --   --    EOS ABS 0.00  --   --  0.04  --  0.00  --   --    MCV 84.5  --   --  86.2  --  85.6  --   --    SED RATE  --  >130*  --   --   --   --   --   --    CRP  --   --  1.50*  --   --   --   --   --    PROCALCITONIN  --   --   --   --   --   --  0.72*  --     PROTIME  --   --   --   --  15.5*  --   --  10.8   APTT  --   --   --   --   --   --   --  30.6   HSTROP T  --   --   --   --  37* 34*  --   --          Lab 07/02/25  0542 07/01/25  0429 07/01/25  0100   SODIUM 143  --  138   POTASSIUM 4.7  --  4.6   CHLORIDE 110*  --  105   CO2 24.0  --  18.5*   ANION GAP 9.0  --  14.5   BUN 74.8*  --  75.5*   CREATININE 1.97*  --  2.89*   EGFR 35.0*  --  22.1*   GLUCOSE 116*  --  320*   CALCIUM 8.9  --  8.6   MAGNESIUM  --  2.3 2.2   HEMOGLOBIN A1C  --   --  7.33*   TSH  --  0.630  --          Lab 07/01/25  0100   TOTAL PROTEIN 6.7   ALBUMIN 2.8*   GLOBULIN 3.9   ALT (SGPT) 37   AST (SGOT) 21   BILIRUBIN <0.2   ALK PHOS 70         Lab 07/01/25  0429 07/01/25  0100 06/27/25  1450   PROBNP 4,325.0*  --   --    HSTROP T 37* 34*  --    PROTIME 15.5*  --  10.8   INR 1.16*  --  1.06                 Brief Urine Lab Results       None            Microbiology Results Abnormal       None            CT Chest Hi Resolution Diagnostic  Result Date: 7/2/2025  CT CHEST HI RESOLUTION DIAGNOSTIC Date of Exam: 7/2/2025 3:48 PM EDT Indication: IPF. Comparison: Chest radiograph 7/2/2025. No prior chest CT scan Technique: CT scan of the chest with high-resolution protocol was performed without contrast administration.  Automated exposure control and iterative construction methods were used. Findings: There is advanced bullous change throughout the lungs, severe in the lung apices. There is a small to moderate free-flowing right pleural effusion and minimal left effusion. There is relatively mild discoid atelectasis of the posterior right lower lobe associated with effusion which largely clears on prone imaging.  Scattered areas of finely reticular interstitial change in the lungs are generally associated with bullous disease, and not confined to the periphery, not particularly suggesting interstitial septal thickening associated with IPF. Appearance is more suggestive of mild active inflammation or  residual postinflammatory scarring superimposed on a background of advanced emphysema. Lower lungs suggest there may be a component of mild residual interstitial edema as well. No airspace opacity particularly suspicious for a lobar pneumonia is seen. Images of the mediastinum show shotty normal-sized nodes and no evidence of adenopathy, dominant mass or pericardial effusion. There is dense coronary artery calcification. Images of the upper abdomen show mild fatty liver change, and previous cholecystectomy. Calcification of the left renal hilum image 113 is thought to be a vascular calcification, rather than in the renal collecting system. Bony structures appear grossly intact.     Impression: Impression: 1. Small to moderate free-flowing right pleural effusion, associated mild discoid atelectasis, and trace left pleural effusion. Mild diffuse basilar interstitial lung changes that may represent some residual interstitial edema. 2. Advanced changes of emphysema elsewhere, and patchy reticular interstitial lung disease in a nonspecific pattern, much of which may be related to patient's emphysema. Please see above discussion Electronically Signed: Kenny Aguilar MD  7/2/2025 4:20 PM EDT  Workstation ID: [id number]    NM Lung Ventilation Perfusion  Result Date: 7/2/2025  DATE OF EXAM: 7/2/2025 12:19 PM EDT PROCEDURE: NM LUNG VENTILATION PERFUSION INDICATIONS: hypoxemic respiratory failure, GERARD on CKD COMPARISON: Chest radiographs from 7/2/2025 and 7/1/2025 TECHNIQUE: The patient was ventilated with 30.0 mCi of technetium 99m pertechnetate aerosol and ventilation images were acquired in multiple obliquities. The patient then received 5.48 mCi of technetium 99m MAA intravenously and perfusion images were acquired in multiple obliquities. FINDINGS: Perfusion exam is heterogeneous, but without well-defined segmental, subsegmental, or even smaller peripheral defects. Ventilation portion of the exam is highly irregular, but in a  generalized pattern, as may be seen with multifocal airspace disease, severe bullous change, etc. Overall, the pattern appears to reflect diffuse pulmonary parenchymal disease such as pneumonia or pulmonary edema, rather than pulmonary embolic disease.     Impression: Asymmetric VQ findings, with much more abnormal diffuse ventilation pattern. Heterogeneous perfusion pattern with no definable defects. Findings are considered low to intermediate probability for pulmonary embolus, but are more likely to represent pneumonia or pulmonary edema. Electronically Signed: Kenny Aguilar MD  7/2/2025 1:39 PM EDT  Workstation ID: [id number]    XR Chest PA & Lateral  Result Date: 7/2/2025  XR CHEST PA AND LATERAL Date of Exam: 7/2/2025 1:04 PM EDT Indication: for VQ scan Comparison: 7/1/2025 chest radiograph Findings: Diffuse interstitial disease seen on yesterday's study is improving, now mostly seen in the lung bases, and right lateral midlung. Findings may reflect resolving interstitial edema. There are very small pleural effusions. No densely consolidated lung or pneumothorax is seen. Heart is enlarged. Vasculature is now upper limits of normal.     Impression: Impression: Chest x-ray appearance consistent with improving pulmonary edema. Small remaining pleural effusions. Electronically Signed: Kenny Aguilar MD  7/2/2025 1:28 PM EDT  Workstation ID: [id number]    OUTSIDE NON-INVASIVE CARDIOLOGY STUDY  Result Date: 7/1/2025  This procedure was auto-finalized with no dictation required.    Duplex Carotid Ultrasound CAR  Result Date: 7/1/2025    Right internal carotid artery demonstrates a less than 50% stenosis.   Antegrade right vertebral flow.   Left internal carotid artery demonstrates a less than 50% stenosis.   Antegrade left vertebral flow.       Results for orders placed during the hospital encounter of 06/30/25    Adult Transthoracic Echo Complete W/ Cont if Necessary Per Protocol 07/01/2025  3:03 PM    Interpretation Summary     Left ventricular systolic function is normal. Calculated left ventricular EF = 61.5%    Left ventricular wall thickness is consistent with hypertrophy.    Left ventricular diastolic function was normal.    The left atrial cavity is dilated.    Left atrial volume is mildly increased.    Moderate aortic valve stenosis is present.    Aortic valve maximum pressure gradient is 38 mmHg. Aortic valve mean pressure gradient is 20 mmHg.    Systolic anterior motion of the anterior mitral leaflet is present.      Current medications:  Scheduled Meds:arformoterol, 15 mcg, Nebulization, BID - RT  aspirin, 81 mg, Oral, Daily  budesonide, 0.5 mg, Nebulization, BID - RT  cefepime, 2,000 mg, Intravenous, Q12H  dilTIAZem CD, 360 mg, Oral, Q24H  ferrous sulfate, 325 mg, Oral, Daily With Breakfast  guaiFENesin, 1,200 mg, Oral, Q12H  heparin (porcine), 5,000 Units, Subcutaneous, Q12H  insulin lispro, 2-7 Units, Subcutaneous, 4x Daily AC & at Bedtime  methylPREDNISolone sodium succinate, 60 mg, Intravenous, Q24H  metoprolol tartrate, 25 mg, Oral, Q12H  NIFEdipine XL, 30 mg, Oral, Daily  pantoprazole, 40 mg, Oral, Q AM  pravastatin, 20 mg, Oral, Nightly  sodium chloride, 10 mL, Intravenous, Q12H  sodium chloride, 4 mL, Nebulization, BID - RT  [Held by provider] torsemide, 20 mg, Oral, Daily      Continuous Infusions:Pharmacy To Dose:,       PRN Meds:.  acetaminophen **OR** acetaminophen **OR** acetaminophen    albuterol    albuterol    senna-docusate sodium **AND** polyethylene glycol **AND** bisacodyl **AND** bisacodyl    dextrose    dextrose    glucagon (human recombinant)    nitroglycerin    Pharmacy To Dose:    sodium chloride    sodium chloride    Assessment & Plan   Assessment & Plan     Active Hospital Problems    Diagnosis  POA    **Acute diastolic heart failure [I50.31]  Yes    Acute respiratory failure with hypoxia [J96.01]  Yes    Anemia [D64.9]  Yes    CHF (congestive heart failure) [I50.9]  Yes    Chronic kidney disease  [N18.9]  Yes    COPD (chronic obstructive pulmonary disease) [J44.9]  Yes    Diabetes mellitus [E11.9]  Yes    Dyslipidemia [E78.5]  Yes    Hypertension [I10]  Yes      Resolved Hospital Problems   No resolved problems to display.        Brief Hospital Course to date:  Baldemar Anderson is a 75 y.o. male with a past medical history of COPD, type 2 diabetes, hypertension, CKD, former tobacco use disorder, chronic anemia, and dyslipidemia. He was transferred from [facility] for evaluation of a TAVR.  CT surgery and cardiology follow        Acute respiratory failure with hypoxia  Acute on chronic diastolic heart failure  Hypertension  Moderate aortic stenosis  - Currently on high flow, and comfortable  - strict I&O's  - daily weights  - Repeat echo on 7/1 showed EF of 61.5%, left ventricular hypertrophy, normal diastolic function, moderate aortic valve stenosis.  Cardiology has reviewed and does not suspect that aortic stenosis is the full picture for his hypoxemia since the aortic valve opens adequately on echo.  Planning for heart cath 7/7 and suggest pulmonary workup for pulmonary fibrosis but pulm believes all this to be COPD, fibrosis labs sent and are pending  --VQ scan without mismatch       COPD  Possible pneumonia  - continue DuoNebs and Pulmicort  - treated with steroids at OSH.  Steroids were DCd on 6/30  - symbicort  - continue cefepime for now  - BC x 2 in process  - sputum culture unremarkable  -pulm consult for w/u of possible underlying pulm fibrosis as driving force of hypoxemia, will order for AM        Acute on chronic renal failure, stage 3b.  - Baseline creatinine is 2.1  - Avoid nephrotoxic medications  - Monitor I's and O's and daily weights  - holding lisinopril, torsemide  --bc of heart cath tmrw will likely need fluids periprocedurally     Hyponatremia.-- resolved  - Sodium is 127 at OSH  - was given samsca at OSH  - Na 138 on arrival here and stable at 140     Type 2  diabetes.  - FSBG with SSI  - Holding glimepiride  --A1c is 7.3  -preprandial bolus added to regimen     Chronic anemia.  - Hgn 9.8, Hct 32.7  - no overt signs of bleeding  - continue to monitor       Expected Discharge Location and Transportation: TBD  Expected Discharge   Expected Discharge Date: 7/7/2025; Expected Discharge Time:      VTE Prophylaxis:  Pharmacologic VTE prophylaxis orders are present.         AM-PAC 6 Clicks Score (PT): 21 (07/02/25 2000)    CODE STATUS:   Code Status and Medical Interventions: CPR (Attempt to Resuscitate); Full Support   Ordered at: 07/01/25 0243     Code Status (Patient has no pulse and is not breathing):    CPR (Attempt to Resuscitate)     Medical Interventions (Patient has pulse or is breathing):    Full Support     Level Of Support Discussed With:    Patient       Elsa Titus MD  07/03/25

## 2025-07-03 NOTE — PLAN OF CARE
Goal Outcome Evaluation:              Outcome Evaluation: Pt A&Ox3 disoriented to time. Blood pressure elevated this morning 171/97- metoprolol switched to carvedilol, terzosin added nightly per cardiology. BNP 2,989- lasix added today per cardiology. Blood sugars continue to be elevated in the 200s today, 4units humalog  with meals added in addition to the slidding scale, 10 untis lantus added nightly per hospitalist. Patient now on 6LNC from highflow this morning- goal to keep sats above 88% per pulmonology. Pt currently in bed resting with call light in reach.

## 2025-07-03 NOTE — PROGRESS NOTES
"  [facility]  PROGRESS NOTE    Date of Admission: 6/30/2025  Date of Service: 07/03/25    Primary Care Physician: Aparna Mendoza MD    Chief Complaint: Aortic stenosis, acute diastolic heart failure due to valvular heart disease  Problem List:   Acute diastolic heart failure    Anemia    CHF (congestive heart failure)    Chronic kidney disease    COPD (chronic obstructive pulmonary disease)    Diabetes mellitus    Dyslipidemia    Hypertension    Acute respiratory failure with hypoxia      Subjective      HPI: Patient is sitting up in the chair on O2 at 6 L via nasal cannula.  He is reading the newspaper.  His family is at bedside.  He says he had a \"bad night\" last night.  He did not necessarily have chest pain but felt short of breath.      Objective   Vitals: /97 (BP Location: Left arm, Patient Position: Lying)   Pulse 63   Temp 97.7 °F (36.5 °C) (Oral)   Resp 18   Ht 180.3 cm (71\")   Wt 80.9 kg (178 lb 5.6 oz)   SpO2 100%   BMI 24.88 kg/m²     Physical Exam:  GENERAL: Alert, cooperative, in no acute distress.   HEENT: Normocephalic, no jugular venous distention  HEART: Regular rhythm, normal rate, and systolic murmur, gallops, or rubs.   LUNGS: Diminished lung sounds bilaterally, rhonchi  ABDOMEN: Soft, bowel sounds present, nontender   NEUROLOGIC: No focal abnormalities involving strength or sensation are noted.   EXTREMITIES: No clubbing, cyanosis, or edema noted.     Results:  Results from last 7 days   Lab Units 07/03/25  0644 07/02/25  0542 07/01/25  0100   WBC 10*3/mm3 17.97* 19.63* 21.10*   HEMOGLOBIN g/dL 10.9* 11.1* 9.8*   HEMATOCRIT % 36.1* 36.8* 32.7*   PLATELETS 10*3/mm3 403 409 406     Results from last 7 days   Lab Units 07/03/25  0644 07/02/25  0542 07/01/25  0100   SODIUM mmol/L 140 143 138   POTASSIUM mmol/L 5.0 4.7 4.6   CHLORIDE mmol/L 108* 110* 105   CO2 mmol/L 21.0* 24.0 18.5*   BUN mg/dL 82.4* 74.8* 75.5*   CREATININE mg/dL 2.08* 1.97* " 2.89*   GLUCOSE mg/dL 166* 116* 320*      Lab Results   Component Value Date    AST 21 07/01/2025    ALT 37 07/01/2025     Results from last 7 days   Lab Units 07/01/25  0100   HEMOGLOBIN A1C % 7.33*         Results from last 7 days   Lab Units 07/01/25  0429   TSH uIU/mL 0.630         Results from last 7 days   Lab Units 07/01/25  0429 06/27/25  1450   PROTIME Seconds 15.5* 10.8   INR  1.16* 1.06   APTT seconds  --  30.6     Results from last 7 days   Lab Units 07/01/25  0429 07/01/25  0100   HSTROP T ng/L 37* 34*     Results from last 7 days   Lab Units 07/01/25 0429   PROBNP pg/mL 4,325.0*         Intake/Output Summary (Last 24 hours) at 7/3/2025 1209  Last data filed at 7/3/2025 0353  Gross per 24 hour   Intake 480 ml   Output 300 ml   Net 180 ml       I personally reviewed the patient's EKG/Telemetry data      EKG 7/1/2025: Normal sinus rhythm    Radiology Data: CT of the chest 7/2/2025: Small to moderate free-flowing right pleural effusion associated with mild discoid atelectasis and trace left pleural effusion.  Interstitial lung changes.  Advanced changes of emphysema.    Current Medications:  arformoterol, 15 mcg, Nebulization, BID - RT  aspirin, 81 mg, Oral, Daily  budesonide, 0.5 mg, Nebulization, BID - RT  carvedilol, 12.5 mg, Oral, BID With Meals  cefepime, 2,000 mg, Intravenous, Q12H  dilTIAZem CD, 360 mg, Oral, Q24H  ferrous sulfate, 325 mg, Oral, Daily With Breakfast  guaiFENesin, 1,200 mg, Oral, Q12H  heparin (porcine), 5,000 Units, Subcutaneous, Q12H  insulin glargine, 10 Units, Subcutaneous, Nightly  insulin lispro, 2-7 Units, Subcutaneous, 4x Daily AC & at Bedtime  Insulin Lispro, 4 Units, Subcutaneous, TID With Meals  methylPREDNISolone sodium succinate, 60 mg, Intravenous, Q24H  pantoprazole, 40 mg, Oral, Q AM  pravastatin, 20 mg, Oral, Nightly  sodium chloride, 10 mL, Intravenous, Q12H  sodium chloride, 4 mL, Nebulization, BID - RT  terazosin, 2 mg, Oral, Nightly  [Held by provider]  torsemide, 20 mg, Oral, Daily      Pharmacy To Dose:,         Assessment:   Valvular heart disease/aortic stenosis  Echo 1/2025 moderate aortic stenosis  Echo at outside facility 6/30/2025 severe aortic stenosis with normal LVEF but mean aortic valve gradient only 22 mmHg  Aspirin 81 mg daily  Borderline for low-flow low gradient aortic stenosis with stroke-volume index of 35 mL/minute/M squared however aortic valve appears to open adequately on echo images  Acute on chronic diastolic heart failure likely due to valvular heart disease  Echo at outside facility states severe aortic stenosis but mean gradient here only 22 mmHg  proBNP elevated 4325 and pleural effusions noted on CT of the chest  Diuretic on hold due to creatinine over 2  Mildly elevated high-sensitivity troponin/type II mechanism secondary to CHF  Was dense coronary calcification noted on recent CT of the chest  Aspirin 81 mg daily  Interstitial lung disease/pneumonia  VQ scan 7/2/2025 showed asymmetric V/Q findings with much more abnormal diffuse ventilation pattern.  Findings considered low to intermediate probability for pulmonary embolism but are more likely to represent pneumonia or pulmonary edema  CT of the chest shows moderate right pleural effusion and trace left pleural effusion.  Advanced changes of emphysema  Pulmonology consulted and following possible pulmonary fibrosis  Started on steroids and antibiotic  Type 2 diabetes mellitus  A1c 7.33  Chronic kidney disease  Baseline creatinine around 2  On admission creatinine was 2.98 but current creatinine back to baseline at 2.08   Hypertension  Current /97  Diltiazem 360 mg daily  Nifedipine 30 mg daily  Metoprolol tartrate 25 mg twice daily  Hyperlipidemia  Pravastatin 20 mg daily    Plan:   Change metoprolol tartrate to carvedilol 12.5 mg twice daily for better blood pressure control  Discontinue nifedipine due to concomitant use of diltiazem  Start doxazosin 2 mg nightly for better  blood pressure control  Give 40 mg IV Lasix x 1 today.  Will reassess tomorrow need for further diuretics.  Monitor creatinine closely  NPO. after midnight on Sunday for cardiac cath Monday as workup for TAVR  Appreciate pulmonology assistance      HARRISON Briscoe

## 2025-07-03 NOTE — PROGRESS NOTES
[facility] Cardiothoracic Surgery In-Patient Progress Note     LOS: 2 days     Chief Complaint: Aortic stenosis    Subjective  Had a rough night. States the steroids kept him up. On HFNC 45L saturations 97%.     Objective  Vital Signs  Temp:  [97.6 °F (36.4 °C)-98.3 °F (36.8 °C)] 98.3 °F (36.8 °C)  Heart Rate:  [64-91] 70  Resp:  [18-20] 20  BP: (148-174)/(72-84) 150/74    Physical Exam:   General Appearance: alert, appears stated age and cooperative   Lungs: Diminished bases bilaterally   Heart: Grade II systolic murmur noted     Results     Results from last 7 days   Lab Units 07/02/25  0542   WBC 10*3/mm3 19.63*   HEMOGLOBIN g/dL 11.1*   HEMATOCRIT % 36.8*   PLATELETS 10*3/mm3 409     Results from last 7 days   Lab Units 07/02/25  0542   SODIUM mmol/L 143   POTASSIUM mmol/L 4.7   CHLORIDE mmol/L 110*   CO2 mmol/L 24.0   BUN mg/dL 74.8*   CREATININE mg/dL 1.97*   GLUCOSE mg/dL 116*   CALCIUM mg/dL 8.9     Carotid duplex    Right internal carotid artery demonstrates a less than 50% stenosis.    Antegrade right vertebral flow.    Left internal carotid artery demonstrates a less than 50% stenosis.    Antegrade left vertebral flow.    TTE: 7/1/2025    Left ventricular systolic function is normal. Calculated left ventricular EF = 61.5%    Left ventricular wall thickness is consistent with hypertrophy.    Left ventricular diastolic function was normal.    The left atrial cavity is dilated.    Left atrial volume is mildly increased.    Moderate aortic valve stenosis is present.    Aortic valve maximum pressure gradient is 38 mmHg. Aortic valve mean pressure gradient is 20 mmHg.    Systolic anterior motion of the anterior mitral leaflet is present.    Assessment    Acute diastolic heart failure    Anemia    CHF (congestive heart failure)    Chronic kidney disease    COPD (chronic obstructive pulmonary disease)    Diabetes mellitus    Dyslipidemia    Hypertension    Acute respiratory failure with hypoxia      Plan    Wean HFNC as tolerated  Scheduled for cardiac cath on Monday, 7/7 - will await results  Continue medical optimization        Taylor Hutchison, HARRISON  07/03/25  07:27 EDT

## 2025-07-04 LAB
ANION GAP SERPL CALCULATED.3IONS-SCNC: 11 MMOL/L (ref 5–15)
BUN SERPL-MCNC: 90.9 MG/DL (ref 8–23)
BUN/CREAT SERPL: 38.7 (ref 7–25)
CALCIUM SPEC-SCNC: 8.7 MG/DL (ref 8.6–10.5)
CHLORIDE SERPL-SCNC: 110 MMOL/L (ref 98–107)
CO2 SERPL-SCNC: 21 MMOL/L (ref 22–29)
CREAT SERPL-MCNC: 2.35 MG/DL (ref 0.76–1.27)
EGFRCR SERPLBLD CKD-EPI 2021: 28.3 ML/MIN/1.73
GLUCOSE BLDC GLUCOMTR-MCNC: 174 MG/DL (ref 70–130)
GLUCOSE BLDC GLUCOMTR-MCNC: 180 MG/DL (ref 70–130)
GLUCOSE BLDC GLUCOMTR-MCNC: 222 MG/DL (ref 70–130)
GLUCOSE BLDC GLUCOMTR-MCNC: 333 MG/DL (ref 70–130)
GLUCOSE SERPL-MCNC: 200 MG/DL (ref 65–99)
POTASSIUM SERPL-SCNC: 4.9 MMOL/L (ref 3.5–5.2)
SODIUM SERPL-SCNC: 142 MMOL/L (ref 136–145)

## 2025-07-04 PROCEDURE — 99232 SBSQ HOSP IP/OBS MODERATE 35: CPT | Performed by: INTERNAL MEDICINE

## 2025-07-04 PROCEDURE — 94799 UNLISTED PULMONARY SVC/PX: CPT

## 2025-07-04 PROCEDURE — 63710000001 INSULIN LISPRO (HUMAN) PER 5 UNITS: Performed by: NURSE PRACTITIONER

## 2025-07-04 PROCEDURE — 99232 SBSQ HOSP IP/OBS MODERATE 35: CPT

## 2025-07-04 PROCEDURE — 25010000002 METHYLPREDNISOLONE PER 125 MG: Performed by: INTERNAL MEDICINE

## 2025-07-04 PROCEDURE — 25010000002 HEPARIN (PORCINE) PER 1000 UNITS: Performed by: NURSE PRACTITIONER

## 2025-07-04 PROCEDURE — 94761 N-INVAS EAR/PLS OXIMETRY MLT: CPT

## 2025-07-04 PROCEDURE — 63710000001 INSULIN LISPRO (HUMAN) PER 5 UNITS: Performed by: INTERNAL MEDICINE

## 2025-07-04 PROCEDURE — 25010000002 CEFEPIME PER 500 MG: Performed by: INTERNAL MEDICINE

## 2025-07-04 PROCEDURE — 63710000001 INSULIN GLARGINE PER 5 UNITS: Performed by: INTERNAL MEDICINE

## 2025-07-04 PROCEDURE — 82948 REAGENT STRIP/BLOOD GLUCOSE: CPT

## 2025-07-04 PROCEDURE — 80048 BASIC METABOLIC PNL TOTAL CA: CPT | Performed by: NURSE PRACTITIONER

## 2025-07-04 RX ORDER — WATER 10 ML/10ML
INJECTION INTRAMUSCULAR; INTRAVENOUS; SUBCUTANEOUS
Status: COMPLETED
Start: 2025-07-04 | End: 2025-07-04

## 2025-07-04 RX ADMIN — PRAVASTATIN SODIUM 20 MG: 20 TABLET ORAL at 20:35

## 2025-07-04 RX ADMIN — SODIUM CHLORIDE SOLN NEBU 7% 4 ML: 7 NEBU SOLN at 07:15

## 2025-07-04 RX ADMIN — INSULIN LISPRO 5 UNITS: 100 INJECTION, SOLUTION INTRAVENOUS; SUBCUTANEOUS at 20:35

## 2025-07-04 RX ADMIN — INSULIN LISPRO 4 UNITS: 100 INJECTION, SOLUTION INTRAVENOUS; SUBCUTANEOUS at 17:12

## 2025-07-04 RX ADMIN — INSULIN LISPRO 3 UNITS: 100 INJECTION, SOLUTION INTRAVENOUS; SUBCUTANEOUS at 17:11

## 2025-07-04 RX ADMIN — TERAZOSIN HYDROCHLORIDE 2 MG: 2 CAPSULE ORAL at 20:36

## 2025-07-04 RX ADMIN — SODIUM CHLORIDE SOLN NEBU 7% 4 ML: 7 NEBU SOLN at 19:51

## 2025-07-04 RX ADMIN — DILTIAZEM HYDROCHLORIDE 360 MG: 180 CAPSULE, EXTENDED RELEASE ORAL at 08:37

## 2025-07-04 RX ADMIN — HEPARIN SODIUM 5000 UNITS: 5000 INJECTION INTRAVENOUS; SUBCUTANEOUS at 20:35

## 2025-07-04 RX ADMIN — ARFORMOTEROL TARTRATE 15 MCG: 15 SOLUTION RESPIRATORY (INHALATION) at 19:35

## 2025-07-04 RX ADMIN — CARVEDILOL 12.5 MG: 12.5 TABLET, FILM COATED ORAL at 08:37

## 2025-07-04 RX ADMIN — Medication 10 ML: at 08:38

## 2025-07-04 RX ADMIN — HEPARIN SODIUM 5000 UNITS: 5000 INJECTION INTRAVENOUS; SUBCUTANEOUS at 08:37

## 2025-07-04 RX ADMIN — INSULIN LISPRO 4 UNITS: 100 INJECTION, SOLUTION INTRAVENOUS; SUBCUTANEOUS at 12:17

## 2025-07-04 RX ADMIN — BUDESONIDE 0.5 MG: 0.5 SUSPENSION RESPIRATORY (INHALATION) at 19:43

## 2025-07-04 RX ADMIN — WATER 10 ML: 1 INJECTION INTRAMUSCULAR; INTRAVENOUS; SUBCUTANEOUS at 11:05

## 2025-07-04 RX ADMIN — ARFORMOTEROL TARTRATE 15 MCG: 15 SOLUTION RESPIRATORY (INHALATION) at 07:15

## 2025-07-04 RX ADMIN — INSULIN GLARGINE 10 UNITS: 100 INJECTION, SOLUTION SUBCUTANEOUS at 20:35

## 2025-07-04 RX ADMIN — Medication 10 ML: at 20:36

## 2025-07-04 RX ADMIN — INSULIN LISPRO 4 UNITS: 100 INJECTION, SOLUTION INTRAVENOUS; SUBCUTANEOUS at 08:38

## 2025-07-04 RX ADMIN — INSULIN LISPRO 2 UNITS: 100 INJECTION, SOLUTION INTRAVENOUS; SUBCUTANEOUS at 08:38

## 2025-07-04 RX ADMIN — PANTOPRAZOLE SODIUM 40 MG: 40 TABLET, DELAYED RELEASE ORAL at 05:13

## 2025-07-04 RX ADMIN — BUDESONIDE 0.5 MG: 0.5 SUSPENSION RESPIRATORY (INHALATION) at 07:16

## 2025-07-04 RX ADMIN — GUAIFENESIN 1200 MG: 600 TABLET, EXTENDED RELEASE ORAL at 08:37

## 2025-07-04 RX ADMIN — CEFEPIME 2000 MG: 2 INJECTION, POWDER, FOR SOLUTION INTRAVENOUS at 20:37

## 2025-07-04 RX ADMIN — CEFEPIME 2000 MG: 2 INJECTION, POWDER, FOR SOLUTION INTRAVENOUS at 08:38

## 2025-07-04 RX ADMIN — CARVEDILOL 12.5 MG: 12.5 TABLET, FILM COATED ORAL at 17:11

## 2025-07-04 RX ADMIN — FERROUS SULFATE TAB 325 MG (65 MG ELEMENTAL FE) 325 MG: 325 (65 FE) TAB at 08:37

## 2025-07-04 RX ADMIN — INSULIN LISPRO 2 UNITS: 100 INJECTION, SOLUTION INTRAVENOUS; SUBCUTANEOUS at 12:16

## 2025-07-04 RX ADMIN — GUAIFENESIN 1200 MG: 600 TABLET, EXTENDED RELEASE ORAL at 20:36

## 2025-07-04 RX ADMIN — METHYLPREDNISOLONE SODIUM SUCCINATE 60 MG: 125 INJECTION INTRAMUSCULAR; INTRAVENOUS at 11:05

## 2025-07-04 RX ADMIN — ASPIRIN 81 MG: 81 TABLET, COATED ORAL at 08:37

## 2025-07-04 NOTE — PROGRESS NOTES
[facility] Medicine Services  PROGRESS NOTE    Patient Name: Baldemar Anderson  : 1950  MRN: [id number]    Date of Admission: 2025  Primary Care Physician: Aparna Mendoza MD    Subjective   Subjective     CC:  F/u hypoxemia    HPI:  No new issues overnight. Getting ready to go for a walk, smiling and chewing gum      Objective   Objective     Vital Signs:   Temp:  [97.5 °F (36.4 °C)-98.2 °F (36.8 °C)] 97.8 °F (36.6 °C)  Heart Rate:  [58-74] 71  Resp:  [16-18] 18  BP: (141-166)/(49-77) 166/68  Flow (L/min) (Oxygen Therapy):  [4-45] 4     Physical Exam:  Constitutional: No acute distress, awake, alert, much brighter  HENT: NCAT, mucous membranes moist  Respiratory: good breath sounds, on 4 LPM  Cardiovascular: RRR, + systolic murmur  Gastrointestinal: Positive bowel sounds, soft, nontender, nondistended  Musculoskeletal: No bilateral ankle edema  Psychiatric: Appropriate affect, cooperative  Neurologic: Oriented x 3, no focal deficits  Skin: No rashes      Results Reviewed:  LAB RESULTS:      Lab 25  0644 25  1224 25  1148 25  0542 25  0429 25  0100 25  1007 25  1450   WBC 17.97*  --   --  19.63*  --  21.10*  --   --    HEMOGLOBIN 10.9*  --   --  11.1*  --  9.8*  --   --    HEMATOCRIT 36.1*  --   --  36.8*  --  32.7*  --   --    PLATELETS 403  --   --  409  --  406  --   --    NEUTROS ABS 15.89*  --   --  16.12*  --  19.57*  --   --    IMMATURE GRANS (ABS) 0.27*  --   --  0.37*  --  0.34*  --   --    LYMPHS ABS 0.93  --   --  1.67  --  0.61*  --   --    MONOS ABS 0.85  --   --  1.38*  --  0.54  --   --    EOS ABS 0.00  --   --  0.04  --  0.00  --   --    MCV 84.5  --   --  86.2  --  85.6  --   --    SED RATE  --  >130*  --   --   --   --   --   --    CRP  --   --  1.50*  --   --   --   --   --    PROCALCITONIN  --   --   --   --   --   --  0.72*  --    PROTIME  --   --   --   --  15.5*  --   --  10.8   APTT  --   --   --   --    --   --   --  30.6   HSTROP T  --   --   --   --  37* 34*  --   --          Lab 07/04/25  0523 07/03/25  0644 07/02/25  0542 07/01/25  0429 07/01/25  0100   SODIUM 142 140 143  --  138   POTASSIUM 4.9 5.0 4.7  --  4.6   CHLORIDE 110* 108* 110*  --  105   CO2 21.0* 21.0* 24.0  --  18.5*   ANION GAP 11.0 11.0 9.0  --  14.5   BUN 90.9* 82.4* 74.8*  --  75.5*   CREATININE 2.35* 2.08* 1.97*  --  2.89*   EGFR 28.3* 32.8* 35.0*  --  22.1*   GLUCOSE 200* 166* 116*  --  320*   CALCIUM 8.7 8.9 8.9  --  8.6   MAGNESIUM  --   --   --  2.3 2.2   HEMOGLOBIN A1C  --   --   --   --  7.33*   TSH  --   --   --  0.630  --          Lab 07/01/25  0100   TOTAL PROTEIN 6.7   ALBUMIN 2.8*   GLOBULIN 3.9   ALT (SGPT) 37   AST (SGOT) 21   BILIRUBIN <0.2   ALK PHOS 70         Lab 07/03/25  1301 07/01/25  0429 07/01/25  0100 06/27/25  1450   PROBNP 2,989.0* 4,325.0*  --   --    HSTROP T  --  37* 34*  --    PROTIME  --  15.5*  --  10.8   INR  --  1.16*  --  1.06                 Brief Urine Lab Results       None            Microbiology Results Abnormal       None            CT Chest Hi Resolution Diagnostic  Result Date: 7/2/2025  CT CHEST HI RESOLUTION DIAGNOSTIC Date of Exam: 7/2/2025 3:48 PM EDT Indication: IPF. Comparison: Chest radiograph 7/2/2025. No prior chest CT scan Technique: CT scan of the chest with high-resolution protocol was performed without contrast administration.  Automated exposure control and iterative construction methods were used. Findings: There is advanced bullous change throughout the lungs, severe in the lung apices. There is a small to moderate free-flowing right pleural effusion and minimal left effusion. There is relatively mild discoid atelectasis of the posterior right lower lobe associated with effusion which largely clears on prone imaging.  Scattered areas of finely reticular interstitial change in the lungs are generally associated with bullous disease, and not confined to the periphery, not particularly  suggesting interstitial septal thickening associated with IPF. Appearance is more suggestive of mild active inflammation or residual postinflammatory scarring superimposed on a background of advanced emphysema. Lower lungs suggest there may be a component of mild residual interstitial edema as well. No airspace opacity particularly suspicious for a lobar pneumonia is seen. Images of the mediastinum show shotty normal-sized nodes and no evidence of adenopathy, dominant mass or pericardial effusion. There is dense coronary artery calcification. Images of the upper abdomen show mild fatty liver change, and previous cholecystectomy. Calcification of the left renal hilum image 113 is thought to be a vascular calcification, rather than in the renal collecting system. Bony structures appear grossly intact.     Impression: Impression: 1. Small to moderate free-flowing right pleural effusion, associated mild discoid atelectasis, and trace left pleural effusion. Mild diffuse basilar interstitial lung changes that may represent some residual interstitial edema. 2. Advanced changes of emphysema elsewhere, and patchy reticular interstitial lung disease in a nonspecific pattern, much of which may be related to patient's emphysema. Please see above discussion Electronically Signed: Kenny Aguilar MD  7/2/2025 4:20 PM EDT  Workstation ID: [id number]    NM Lung Ventilation Perfusion  Result Date: 7/2/2025  DATE OF EXAM: 7/2/2025 12:19 PM EDT PROCEDURE: NM LUNG VENTILATION PERFUSION INDICATIONS: hypoxemic respiratory failure, GERARD on CKD COMPARISON: Chest radiographs from 7/2/2025 and 7/1/2025 TECHNIQUE: The patient was ventilated with 30.0 mCi of technetium 99m pertechnetate aerosol and ventilation images were acquired in multiple obliquities. The patient then received 5.48 mCi of technetium 99m MAA intravenously and perfusion images were acquired in multiple obliquities. FINDINGS: Perfusion exam is heterogeneous, but without well-defined  segmental, subsegmental, or even smaller peripheral defects. Ventilation portion of the exam is highly irregular, but in a generalized pattern, as may be seen with multifocal airspace disease, severe bullous change, etc. Overall, the pattern appears to reflect diffuse pulmonary parenchymal disease such as pneumonia or pulmonary edema, rather than pulmonary embolic disease.     Impression: Asymmetric VQ findings, with much more abnormal diffuse ventilation pattern. Heterogeneous perfusion pattern with no definable defects. Findings are considered low to intermediate probability for pulmonary embolus, but are more likely to represent pneumonia or pulmonary edema. Electronically Signed: Kenny Aguilar MD  7/2/2025 1:39 PM EDT  Workstation ID: [id number]    XR Chest PA & Lateral  Result Date: 7/2/2025  XR CHEST PA AND LATERAL Date of Exam: 7/2/2025 1:04 PM EDT Indication: for VQ scan Comparison: 7/1/2025 chest radiograph Findings: Diffuse interstitial disease seen on yesterday's study is improving, now mostly seen in the lung bases, and right lateral midlung. Findings may reflect resolving interstitial edema. There are very small pleural effusions. No densely consolidated lung or pneumothorax is seen. Heart is enlarged. Vasculature is now upper limits of normal.     Impression: Impression: Chest x-ray appearance consistent with improving pulmonary edema. Small remaining pleural effusions. Electronically Signed: Kenny Aguilar MD  7/2/2025 1:28 PM EDT  Workstation ID: [id number]      Results for orders placed during the hospital encounter of 06/30/25    Adult Transthoracic Echo Complete W/ Cont if Necessary Per Protocol 07/01/2025  3:03 PM    Interpretation Summary    Left ventricular systolic function is normal. Calculated left ventricular EF = 61.5%    Left ventricular wall thickness is consistent with hypertrophy.    Left ventricular diastolic function was normal.    The left atrial cavity is dilated.    Left atrial volume is  mildly increased.    Moderate aortic valve stenosis is present.    Aortic valve maximum pressure gradient is 38 mmHg. Aortic valve mean pressure gradient is 20 mmHg.    Systolic anterior motion of the anterior mitral leaflet is present.      Current medications:  Scheduled Meds:arformoterol, 15 mcg, Nebulization, BID - RT  aspirin, 81 mg, Oral, Daily  budesonide, 0.5 mg, Nebulization, BID - RT  carvedilol, 12.5 mg, Oral, BID With Meals  cefepime, 2,000 mg, Intravenous, Q12H  dilTIAZem CD, 360 mg, Oral, Q24H  ferrous sulfate, 325 mg, Oral, Daily With Breakfast  guaiFENesin, 1,200 mg, Oral, Q12H  heparin (porcine), 5,000 Units, Subcutaneous, Q12H  insulin glargine, 10 Units, Subcutaneous, Nightly  insulin lispro, 2-7 Units, Subcutaneous, 4x Daily AC & at Bedtime  Insulin Lispro, 4 Units, Subcutaneous, TID With Meals  methylPREDNISolone sodium succinate, 60 mg, Intravenous, Q24H  pantoprazole, 40 mg, Oral, Q AM  pravastatin, 20 mg, Oral, Nightly  sodium chloride, 10 mL, Intravenous, Q12H  sodium chloride, 4 mL, Nebulization, BID - RT  terazosin, 2 mg, Oral, Nightly  [Held by provider] torsemide, 20 mg, Oral, Daily      Continuous Infusions:Pharmacy To Dose:,       PRN Meds:.  acetaminophen **OR** acetaminophen **OR** acetaminophen    albuterol    albuterol    senna-docusate sodium **AND** polyethylene glycol **AND** bisacodyl **AND** bisacodyl    dextrose    dextrose    glucagon (human recombinant)    nitroglycerin    Pharmacy To Dose:    sodium chloride    sodium chloride    temazepam    Assessment & Plan   Assessment & Plan     Active Hospital Problems    Diagnosis  POA    **Acute diastolic heart failure [I50.31]  Yes    Acute respiratory failure with hypoxia [J96.01]  Yes    Anemia [D64.9]  Yes    CHF (congestive heart failure) [I50.9]  Yes    Chronic kidney disease [N18.9]  Yes    COPD (chronic obstructive pulmonary disease) [J44.9]  Yes    Diabetes mellitus [E11.9]  Yes    Dyslipidemia [E78.5]  Yes    Hypertension  [I10]  Yes      Resolved Hospital Problems   No resolved problems to display.        Brief Hospital Course to date:  Baldemar Anderson is a 75 y.o. male with a past medical history of COPD, type 2 diabetes, hypertension, CKD, former tobacco use disorder, chronic anemia, and dyslipidemia. He was transferred from [facility] for evaluation of a TAVR.  CT surgery and cardiology follow        Acute respiratory failure with hypoxia  Acute on chronic diastolic heart failure  Hypertension  Moderate aortic stenosis  - Currently on high flow, and comfortable  - strict I&O's  - daily weights  - Repeat echo on 7/1 showed EF of 61.5%, left ventricular hypertrophy, normal diastolic function, moderate aortic valve stenosis.  Cardiology has reviewed and does not suspect that aortic stenosis is the full picture for his hypoxemia since the aortic valve opens adequately on echo.  Planning for heart cath 7/7 and suggest pulmonary workup for pulmonary fibrosis but pulm believes all this to be COPD, fibrosis labs sent and are pending  --VQ scan without mismatch       COPD  Possible pneumonia  - continue DuoNebs and Pulmicort  - treated with steroids at OSH.  Steroids were DCd on 6/30  - symbicort  - continue cefepime through 7/5  - BC x 2 and sputum culture negative  -pulm consult for w/u of possible underlying pulm fibrosis as driving force of hypoxemia, will order for AM        Acute on chronic renal failure, stage 3b.  WATCH hyperkalemia  - Baseline creatinine is 2.1  - Avoid nephrotoxic medications  - Monitor I's and O's and daily weights  - holding lisinopril, torsemide     Hyponatremia.-- resolved  - Sodium is 127 at OSH  - was given samsca at OSH  - Na 138 on arrival here and stable at 140     Type 2 diabetes.  - FSBG with SSI  - Holding glimepiride  --A1c is 7.3  -preprandial bolus added to regimen     Chronic anemia.  - Hgn 9.8, Hct 32.7  - no overt signs of bleeding  - continue to monitor       Expected  Discharge Location and Transportation: TBD  Expected Discharge   Expected Discharge Date: 7/7/2025; Expected Discharge Time:      VTE Prophylaxis:  Pharmacologic VTE prophylaxis orders are present.         AM-PAC 6 Clicks Score (PT): 17 (07/03/25 5696)    CODE STATUS:   Code Status and Medical Interventions: CPR (Attempt to Resuscitate); Full Support   Ordered at: 07/01/25 0243     Code Status (Patient has no pulse and is not breathing):    CPR (Attempt to Resuscitate)     Medical Interventions (Patient has pulse or is breathing):    Full Support     Level Of Support Discussed With:    Patient       Elsa Titus MD  07/04/25

## 2025-07-04 NOTE — PLAN OF CARE
Goal Outcome Evaluation:              Outcome Evaluation: Patient is a&ox4. VSS. NSR on monitor. 4L NC with sats above 88%. Patient has been compliant with oxygen and incentive spirometer. No complaints of pain. Completed two walks, see flowsheet. Awating heart cath on Monday. Up in chair with alarm set, family at bedside. Call light in reach. Care ongoing.

## 2025-07-04 NOTE — PROGRESS NOTES
[facility] Cardiothoracic Surgery In-Patient Progress Note     LOS: 3 days     Chief Complaint: Aortic stenosis    Subjective  Up in chair in good spirits, eating breakfast. On 6L NC. States he needs a tooth filled or extracted prior to any valve replacement     Objective  Vital Signs  Temp:  [97.5 °F (36.4 °C)-98.2 °F (36.8 °C)] 97.8 °F (36.6 °C)  Heart Rate:  [58-74] 67  Resp:  [16-18] 18  BP: (141-166)/(49-77) 166/68    Physical Exam:   General Appearance: alert, appears stated age and cooperative   Lungs: Diminished bases bilaterally   Heart: Grade II systolic murmur noted     Results     Results from last 7 days   Lab Units 07/03/25  0644   WBC 10*3/mm3 17.97*   HEMOGLOBIN g/dL 10.9*   HEMATOCRIT % 36.1*   PLATELETS 10*3/mm3 403     Results from last 7 days   Lab Units 07/04/25  0523   SODIUM mmol/L 142   POTASSIUM mmol/L 4.9   CHLORIDE mmol/L 110*   CO2 mmol/L 21.0*   BUN mg/dL 90.9*   CREATININE mg/dL 2.35*   GLUCOSE mg/dL 200*   CALCIUM mg/dL 8.7     Carotid duplex    Right internal carotid artery demonstrates a less than 50% stenosis.    Antegrade right vertebral flow.    Left internal carotid artery demonstrates a less than 50% stenosis.    Antegrade left vertebral flow.    TTE: 7/1/2025    Left ventricular systolic function is normal. Calculated left ventricular EF = 61.5%    Left ventricular wall thickness is consistent with hypertrophy.    Left ventricular diastolic function was normal.    The left atrial cavity is dilated.    Left atrial volume is mildly increased.    Moderate aortic valve stenosis is present.    Aortic valve maximum pressure gradient is 38 mmHg. Aortic valve mean pressure gradient is 20 mmHg.    Systolic anterior motion of the anterior mitral leaflet is present.    Assessment    Acute diastolic heart failure    Anemia    CHF (congestive heart failure)    Chronic kidney disease    COPD (chronic obstructive pulmonary disease)    Diabetes mellitus    Dyslipidemia    Hypertension     Acute respiratory failure with hypoxia      Plan   Diuresis per Cardiology   Scheduled for cardiac cath on Monday, 7/7 - will await results  Continue medical optimization        Harper Benitez PA-C  07/04/25  07:53 EDT

## 2025-07-04 NOTE — PLAN OF CARE
Goal Outcome Evaluation:              Outcome Evaluation: Patient A&ox3, 4L NC, NSR, VSS. BP slightly elevated overnight. Patient has been extremely noncomplient with oxygen tonight.Continues to remove oxygen despite being educated on need for it. Other than that no acute events overnight. No complaints of pain. Call light in reach bed alarm set

## 2025-07-04 NOTE — PROGRESS NOTES
[facility] Cardiothoracic Surgery In-Patient Progress Note     LOS: 4 days     Chief Complaint: Aortic stenosis    Subjective  In bed.  Conversant.  Pleasant.  .  States he needs a tooth filled or extracted prior to any valve replacement     Objective  Vital Signs  Temp:  [97.6 °F (36.4 °C)-98.3 °F (36.8 °C)] 97.9 °F (36.6 °C)  Heart Rate:  [57-73] 63  Resp:  [18] 18  BP: (147-164)/(60-69) 150/63    Physical Exam:   General Appearance: alert, appears stated age and cooperative   Lungs: Diminished bases bilaterally   Heart: Grade II systolic murmur noted   Examined and unchanged  Results     Results from last 7 days   Lab Units 07/03/25  0644   WBC 10*3/mm3 17.97*   HEMOGLOBIN g/dL 10.9*   HEMATOCRIT % 36.1*   PLATELETS 10*3/mm3 403     Results from last 7 days   Lab Units 07/04/25  0523   SODIUM mmol/L 142   POTASSIUM mmol/L 4.9   CHLORIDE mmol/L 110*   CO2 mmol/L 21.0*   BUN mg/dL 90.9*   CREATININE mg/dL 2.35*   GLUCOSE mg/dL 200*   CALCIUM mg/dL 8.7     Carotid duplex    Right internal carotid artery demonstrates a less than 50% stenosis.    Antegrade right vertebral flow.    Left internal carotid artery demonstrates a less than 50% stenosis.    Antegrade left vertebral flow.    TTE: 7/1/2025    Left ventricular systolic function is normal. Calculated left ventricular EF = 61.5%    Left ventricular wall thickness is consistent with hypertrophy.    Left ventricular diastolic function was normal.    The left atrial cavity is dilated.    Left atrial volume is mildly increased.    Moderate aortic valve stenosis is present.    Aortic valve maximum pressure gradient is 38 mmHg. Aortic valve mean pressure gradient is 20 mmHg.    Systolic anterior motion of the anterior mitral leaflet is present.    Assessment    Acute diastolic heart failure    Anemia    CHF (congestive heart failure)    Chronic kidney disease    COPD (chronic obstructive pulmonary disease)    Diabetes mellitus    Dyslipidemia    Hypertension     Acute respiratory failure with hypoxia      Plan   Diuresis per Cardiology   Scheduled for cardiac cath on Monday, 7/7 - will await results  Continue medical optimization  Preoperative dental work      ALEXX Soto  07/05/25  08:17 EDT

## 2025-07-04 NOTE — PROGRESS NOTES
"  [facility]  PROGRESS NOTE    Date of Admission: 6/30/2025  Date of Service: 07/04/25    Primary Care Physician: Aparna Mendoza MD    Chief Complaint: Heart failure, aortic stenosis  Problem List:   Acute diastolic heart failure    Anemia    CHF (congestive heart failure)    Chronic kidney disease    COPD (chronic obstructive pulmonary disease)    Diabetes mellitus    Dyslipidemia    Hypertension    Acute respiratory failure with hypoxia      Subjective      No acute events overnight.  Reports significant short of breath with exertion yesterday.  Has not ambulated yet this morning.  No chest pain.  Breathing stable at rest.  States steroids made him shaky.  Hypertensive but just received a.m. medications prior to my visit.      Objective   Vitals: /68 (BP Location: Right arm, Patient Position: Lying)   Pulse 71   Temp 97.8 °F (36.6 °C) (Oral)   Resp 18   Ht 180.3 cm (71\")   Wt 80.9 kg (178 lb 5.6 oz)   SpO2 95%   BMI 24.88 kg/m²     Physical Exam:  GENERAL:  in no acute distress.   HEENT: no jugular venous distention  HEART: Regular rhythm, normal rate, 3/6 systolic ejection murmur  LUNGS: Diminished at bases  EXTREMITIES: No edema noted.     Results:  Results from last 7 days   Lab Units 07/03/25  0644 07/02/25  0542 07/01/25  0100   WBC 10*3/mm3 17.97* 19.63* 21.10*   HEMOGLOBIN g/dL 10.9* 11.1* 9.8*   HEMATOCRIT % 36.1* 36.8* 32.7*   PLATELETS 10*3/mm3 403 409 406     Results from last 7 days   Lab Units 07/04/25  0523 07/03/25  0644 07/02/25  0542   SODIUM mmol/L 142 140 143   POTASSIUM mmol/L 4.9 5.0 4.7   CHLORIDE mmol/L 110* 108* 110*   CO2 mmol/L 21.0* 21.0* 24.0   BUN mg/dL 90.9* 82.4* 74.8*   CREATININE mg/dL 2.35* 2.08* 1.97*   GLUCOSE mg/dL 200* 166* 116*      Lab Results   Component Value Date    AST 21 07/01/2025    ALT 37 07/01/2025     Results from last 7 days   Lab Units 07/01/25  0100   HEMOGLOBIN A1C % 7.33*         Results from last 7 days "   Lab Units 07/01/25  0429   TSH uIU/mL 0.630         Results from last 7 days   Lab Units 07/01/25  0429 06/27/25  1450   PROTIME Seconds 15.5* 10.8   INR  1.16* 1.06   APTT seconds  --  30.6     Results from last 7 days   Lab Units 07/01/25  0429 07/01/25  0100   HSTROP T ng/L 37* 34*     Results from last 7 days   Lab Units 07/03/25  1301   PROBNP pg/mL 2,989.0*         Intake/Output Summary (Last 24 hours) at 7/4/2025 0809  Last data filed at 7/3/2025 2300  Gross per 24 hour   Intake 820 ml   Output 1700 ml   Net -880 ml       I personally reviewed the patient's EKG/Telemetry data    Radiology Data:   Results for orders placed during the hospital encounter of 06/30/25    Adult Transthoracic Echo Complete W/ Cont if Necessary Per Protocol    Interpretation Summary    Left ventricular systolic function is normal. Calculated left ventricular EF = 61.5%    Left ventricular wall thickness is consistent with hypertrophy.    Left ventricular diastolic function was normal.    The left atrial cavity is dilated.    Left atrial volume is mildly increased.    Moderate aortic valve stenosis is present.    Aortic valve maximum pressure gradient is 38 mmHg. Aortic valve mean pressure gradient is 20 mmHg.    Systolic anterior motion of the anterior mitral leaflet is present.      Current Medications:  arformoterol, 15 mcg, Nebulization, BID - RT  aspirin, 81 mg, Oral, Daily  budesonide, 0.5 mg, Nebulization, BID - RT  carvedilol, 12.5 mg, Oral, BID With Meals  cefepime, 2,000 mg, Intravenous, Q12H  dilTIAZem CD, 360 mg, Oral, Q24H  ferrous sulfate, 325 mg, Oral, Daily With Breakfast  guaiFENesin, 1,200 mg, Oral, Q12H  heparin (porcine), 5,000 Units, Subcutaneous, Q12H  insulin glargine, 10 Units, Subcutaneous, Nightly  insulin lispro, 2-7 Units, Subcutaneous, 4x Daily AC & at Bedtime  Insulin Lispro, 4 Units, Subcutaneous, TID With Meals  methylPREDNISolone sodium succinate, 60 mg, Intravenous, Q24H  pantoprazole, 40 mg, Oral,  Q AM  pravastatin, 20 mg, Oral, Nightly  sodium chloride, 10 mL, Intravenous, Q12H  sodium chloride, 4 mL, Nebulization, BID - RT  terazosin, 2 mg, Oral, Nightly  [Held by provider] torsemide, 20 mg, Oral, Daily      Pharmacy To Dose:,         Assessment:   Valvular heart disease/aortic stenosis  Echo 1/2025 moderate aortic stenosis  Echo at outside facility 6/30/2025 severe aortic stenosis with normal LVEF but mean aortic valve gradient only 22 mmHg  Aspirin 81 mg daily  Borderline for low-flow low gradient aortic stenosis with stroke-volume index of 35 mL/minute/M squared however aortic valve appears to open adequately on echo images  Echo at our facility with EF 61%.  Moderate aortic stenosis with a max gradient of 38 and a mean gradient of 20.  Valve area 1.3 cm² and peak velocity 308 cm/s  Acute on chronic diastolic heart failure likely due to valvular heart disease  Echo at outside facility states severe aortic stenosis but mean gradient here only 22 mmHg  proBNP elevated 4325 and pleural effusions noted on CT of the chest  Diuretic on hold due to creatinine over 2  Mildly elevated high-sensitivity troponin/type II mechanism secondary to CHF  Was dense coronary calcification noted on recent CT of the chest  Aspirin 81 mg daily  Interstitial lung disease/pneumonia  VQ scan 7/2/2025 showed asymmetric V/Q findings with much more abnormal diffuse ventilation pattern.  Findings considered low to intermediate probability for pulmonary embolism but are more likely to represent pneumonia or pulmonary edema  CT of the chest shows moderate right pleural effusion and trace left pleural effusion.  Advanced changes of emphysema  Pulmonology consulted and following possible pulmonary fibrosis  Started on steroids and antibiotic  Type 2 diabetes mellitus  A1c 7.33  Chronic kidney disease  Baseline creatinine around 2  On admission creatinine was 2.98 but current creatinine back to baseline at 2.08. Increased to 2.3 after  further diuresis  Hypertension  Diltiazem 360 mg daily  Metoprolol tartrate 25 mg changed to carvedilol 12.5mg BID  doxazosin 2 mg nightly   Hyperlipidemia  Pravastatin 20 mg daily    Plan:   Hold further diuresis today due to increasing creatinine and plan for contrast based study on Monday.  Can determine need for diuresis on a day by day basis.  Low threshold to consult nephrology due to CKD and ongoing contrast studies.  Pulmonology following due to pulmonary fibrosis.  Keep n.p.o. after midnight on Sunday for cardiac catheterization on Monday.  Continue carvedilol, diltiazem and terazosin at current dose.  Consider addition of hydralazine if additional antihypertensive therapy is needed.  No ACE inhibitor/ARB/Entresto due to elevated creatinine.      Malika Villalpando PA-C

## 2025-07-04 NOTE — PROGRESS NOTES
Intensive Care Follow-up     Hospital:  LOS: 3 days   Mr. Baldemar Anderson, 75 y.o. male is followed for:   Acute diastolic heart failure            History of present illness:   75-year-old male with a past medical history significant for hypertension, hyperlipidemia, CHF, diabetes mellitus type 2, anemia, COPD and tobacco abuse. Patient was transferred to [facility] for T[facility]R evaluation currently on high flow nasal cannula. Pulmonary was consulted due to concern for possible pulmonary fibrosis. Patient has shortness of breath. Has been diuresed feels better than on admission. Still on high flow nasal cannula. Denies any chest pain, nausea, fever, or chills.       Subjective   Interval History:  Patient doing well today denies any chest pain, nausea, fever, or chills.             The patient's past medical, surgical and social history were reviewed and updated in Epic as appropriate.       Objective     Infusions:  Pharmacy To Dose:,       Medications:  arformoterol, 15 mcg, Nebulization, BID - RT  aspirin, 81 mg, Oral, Daily  budesonide, 0.5 mg, Nebulization, BID - RT  carvedilol, 12.5 mg, Oral, BID With Meals  cefepime, 2,000 mg, Intravenous, Q12H  dilTIAZem CD, 360 mg, Oral, Q24H  ferrous sulfate, 325 mg, Oral, Daily With Breakfast  guaiFENesin, 1,200 mg, Oral, Q12H  heparin (porcine), 5,000 Units, Subcutaneous, Q12H  insulin glargine, 10 Units, Subcutaneous, Nightly  insulin lispro, 2-7 Units, Subcutaneous, 4x Daily AC & at Bedtime  Insulin Lispro, 4 Units, Subcutaneous, TID With Meals  methylPREDNISolone sodium succinate, 60 mg, Intravenous, Q24H  pantoprazole, 40 mg, Oral, Q AM  pravastatin, 20 mg, Oral, Nightly  sodium chloride, 10 mL, Intravenous, Q12H  sodium chloride, 4 mL, Nebulization, BID - RT  terazosin, 2 mg, Oral, Nightly  [Held by provider] torsemide, 20 mg, Oral, Daily      I reviewed the patient's medications.    Vital Sign Min/Max for last 24 hours  Temp  Min: 97.5 °F (36.4 °C)   Max: 98.2 °F (36.8 °C)   BP  Min: 141/49  Max: 166/68   Pulse  Min: 58  Max: 74   Resp  Min: 16  Max: 18   SpO2  Min: 91 %  Max: 100 %   Flow (L/min) (Oxygen Therapy)  Min: 4  Max: 6       Input/Output for last 24 hour shift  07/03 0701 - 07/04 0700  In: 820 [P.O.:720]  Out: 1700 [Urine:1700]      GENERAL : NAD, conversant  RESPIRATORY/THORAX : normal respiratory effort and no intercostal retractions, Coarse crackles bilaterally  CARDIOVASCULAR : Normal S1/S2, RRR. 1+ lower ext edema.  GASTROINTESTINAL : Soft, NT/ND. BS x 4 normoactive. No hepatosplenomegaly.  MUSCULOSKELETAL : No cyanosis, clubbing, or ischemia  NEUROLOGICAL: alert and oriented to person, place and time  PSYCHOLOGICAL : Appropriate affect    Results from last 7 days   Lab Units 07/03/25  0644 07/02/25  0542 07/01/25  0100   WBC 10*3/mm3 17.97* 19.63* 21.10*   HEMOGLOBIN g/dL 10.9* 11.1* 9.8*   PLATELETS 10*3/mm3 403 409 406     Results from last 7 days   Lab Units 07/04/25  0523 07/03/25  0644 07/02/25  0542 07/01/25  0429 07/01/25  0100   SODIUM mmol/L 142 140 143  --  138   POTASSIUM mmol/L 4.9 5.0 4.7  --  4.6   CO2 mmol/L 21.0* 21.0* 24.0  --  18.5*   BUN mg/dL 90.9* 82.4* 74.8*  --  75.5*   CREATININE mg/dL 2.35* 2.08* 1.97*  --  2.89*   MAGNESIUM mg/dL  --   --   --  2.3 2.2   GLUCOSE mg/dL 200* 166* 116*  --  320*     Estimated Creatinine Clearance: 31.6 mL/min (A) (by C-G formula based on SCr of 2.35 mg/dL (H)).          I reviewed the patient's new clinical results.  I reviewed the patient's new imaging results/reports including actual images and agree with reports.           Assessment & Plan   Impression        Acute diastolic heart failure    Anemia    CHF (congestive heart failure)    Chronic kidney disease    COPD (chronic obstructive pulmonary disease)    Diabetes mellitus    Dyslipidemia    Hypertension    Acute respiratory failure with hypoxia       Plan        -I actually think the more likely scenario is that he has underlying  emphysema and the pulmonary edema is causing him to develop interstitial changes.  Is difficult to 100% tell and ideally once he was in a euvolemic state from a cardiac standpoint we can reevaluate in the future.  - Continue Solu-Medrol 60 mg daily, continue with diuretics to maintain a negative fluid balance.  Mainly this is being driven by cardiology.  - For COPD Brovana and budesonide nebs with albuterol as needed  - Continue antibiotics for possible pneumonia, consider 7 days of therapy  - Continue airway clearance regimen  - Follow-up ILD labs  - Wean oxygen to saturation greater than 88%  - Pulmonary follow-up on Monday      Francois Tong, DO  Pulmonary, Critical care and Sleep Medicine

## 2025-07-05 LAB
ANION GAP SERPL CALCULATED.3IONS-SCNC: 10.8 MMOL/L (ref 5–15)
BASOPHILS # BLD AUTO: 0.01 10*3/MM3 (ref 0–0.2)
BASOPHILS NFR BLD AUTO: 0.1 % (ref 0–1.5)
BUN SERPL-MCNC: 85.3 MG/DL (ref 8–23)
BUN/CREAT SERPL: 40 (ref 7–25)
C-ANCA TITR SER IF: NORMAL TITER
CALCIUM SPEC-SCNC: 8.7 MG/DL (ref 8.6–10.5)
CHLORIDE SERPL-SCNC: 114 MMOL/L (ref 98–107)
CO2 SERPL-SCNC: 18.2 MMOL/L (ref 22–29)
CREAT SERPL-MCNC: 2.13 MG/DL (ref 0.76–1.27)
DEPRECATED RDW RBC AUTO: 61 FL (ref 37–54)
EGFRCR SERPLBLD CKD-EPI 2021: 31.9 ML/MIN/1.73
EOSINOPHIL # BLD AUTO: 0 10*3/MM3 (ref 0–0.4)
EOSINOPHIL NFR BLD AUTO: 0 % (ref 0.3–6.2)
ERYTHROCYTE [DISTWIDTH] IN BLOOD BY AUTOMATED COUNT: 19.3 % (ref 12.3–15.4)
GLUCOSE BLDC GLUCOMTR-MCNC: 211 MG/DL (ref 70–130)
GLUCOSE BLDC GLUCOMTR-MCNC: 240 MG/DL (ref 70–130)
GLUCOSE BLDC GLUCOMTR-MCNC: 262 MG/DL (ref 70–130)
GLUCOSE BLDC GLUCOMTR-MCNC: 277 MG/DL (ref 70–130)
GLUCOSE SERPL-MCNC: 229 MG/DL (ref 65–99)
HCT VFR BLD AUTO: 32.8 % (ref 37.5–51)
HGB BLD-MCNC: 10 G/DL (ref 13–17.7)
IMM GRANULOCYTES # BLD AUTO: 0.19 10*3/MM3 (ref 0–0.05)
IMM GRANULOCYTES NFR BLD AUTO: 1.3 % (ref 0–0.5)
LYMPHOCYTES # BLD AUTO: 0.63 10*3/MM3 (ref 0.7–3.1)
LYMPHOCYTES NFR BLD AUTO: 4.4 % (ref 19.6–45.3)
MAGNESIUM SERPL-MCNC: 2 MG/DL (ref 1.6–2.4)
MCH RBC QN AUTO: 26.7 PG (ref 26.6–33)
MCHC RBC AUTO-ENTMCNC: 30.5 G/DL (ref 31.5–35.7)
MCV RBC AUTO: 87.7 FL (ref 79–97)
MONOCYTES # BLD AUTO: 0.64 10*3/MM3 (ref 0.1–0.9)
MONOCYTES NFR BLD AUTO: 4.5 % (ref 5–12)
MYELOPEROXIDASE AB SER IA-ACNC: <0.2 UNITS (ref 0–0.9)
NEUTROPHILS NFR BLD AUTO: 12.83 10*3/MM3 (ref 1.7–7)
NEUTROPHILS NFR BLD AUTO: 89.7 % (ref 42.7–76)
NRBC BLD AUTO-RTO: 0 /100 WBC (ref 0–0.2)
P-ANCA ATYPICAL TITR SER IF: NORMAL TITER
P-ANCA TITR SER IF: NORMAL TITER
PLATELET # BLD AUTO: 306 10*3/MM3 (ref 140–450)
PMV BLD AUTO: 9.2 FL (ref 6–12)
POTASSIUM SERPL-SCNC: 4.7 MMOL/L (ref 3.5–5.2)
PROTEINASE3 AB SER IA-ACNC: <0.2 UNITS (ref 0–0.9)
RBC # BLD AUTO: 3.74 10*6/MM3 (ref 4.14–5.8)
SODIUM SERPL-SCNC: 143 MMOL/L (ref 136–145)
WBC NRBC COR # BLD AUTO: 14.3 10*3/MM3 (ref 3.4–10.8)

## 2025-07-05 PROCEDURE — 63710000001 INSULIN LISPRO (HUMAN) PER 5 UNITS: Performed by: NURSE PRACTITIONER

## 2025-07-05 PROCEDURE — 25010000002 HEPARIN (PORCINE) PER 1000 UNITS: Performed by: NURSE PRACTITIONER

## 2025-07-05 PROCEDURE — 99232 SBSQ HOSP IP/OBS MODERATE 35: CPT | Performed by: NURSE PRACTITIONER

## 2025-07-05 PROCEDURE — 25010000002 METHYLPREDNISOLONE PER 125 MG: Performed by: INTERNAL MEDICINE

## 2025-07-05 PROCEDURE — 83735 ASSAY OF MAGNESIUM: CPT | Performed by: NURSE PRACTITIONER

## 2025-07-05 PROCEDURE — 94799 UNLISTED PULMONARY SVC/PX: CPT

## 2025-07-05 PROCEDURE — 94761 N-INVAS EAR/PLS OXIMETRY MLT: CPT

## 2025-07-05 PROCEDURE — 99232 SBSQ HOSP IP/OBS MODERATE 35: CPT | Performed by: INTERNAL MEDICINE

## 2025-07-05 PROCEDURE — 63710000001 INSULIN LISPRO (HUMAN) PER 5 UNITS: Performed by: INTERNAL MEDICINE

## 2025-07-05 PROCEDURE — 94664 DEMO&/EVAL PT USE INHALER: CPT

## 2025-07-05 PROCEDURE — 85025 COMPLETE CBC W/AUTO DIFF WBC: CPT | Performed by: NURSE PRACTITIONER

## 2025-07-05 PROCEDURE — 25010000002 CEFEPIME PER 500 MG: Performed by: INTERNAL MEDICINE

## 2025-07-05 PROCEDURE — 63710000001 INSULIN GLARGINE PER 5 UNITS: Performed by: INTERNAL MEDICINE

## 2025-07-05 PROCEDURE — 80048 BASIC METABOLIC PNL TOTAL CA: CPT | Performed by: NURSE PRACTITIONER

## 2025-07-05 PROCEDURE — 82948 REAGENT STRIP/BLOOD GLUCOSE: CPT

## 2025-07-05 RX ORDER — INSULIN LISPRO 100 [IU]/ML
8 INJECTION, SOLUTION INTRAVENOUS; SUBCUTANEOUS
Status: DISCONTINUED | OUTPATIENT
Start: 2025-07-05 | End: 2025-07-06

## 2025-07-05 RX ADMIN — Medication 10 ML: at 21:00

## 2025-07-05 RX ADMIN — PRAVASTATIN SODIUM 20 MG: 20 TABLET ORAL at 20:27

## 2025-07-05 RX ADMIN — INSULIN LISPRO 4 UNITS: 100 INJECTION, SOLUTION INTRAVENOUS; SUBCUTANEOUS at 20:27

## 2025-07-05 RX ADMIN — INSULIN LISPRO 8 UNITS: 100 INJECTION, SOLUTION INTRAVENOUS; SUBCUTANEOUS at 13:06

## 2025-07-05 RX ADMIN — METHYLPREDNISOLONE SODIUM SUCCINATE 60 MG: 125 INJECTION INTRAMUSCULAR; INTRAVENOUS at 10:39

## 2025-07-05 RX ADMIN — INSULIN LISPRO 4 UNITS: 100 INJECTION, SOLUTION INTRAVENOUS; SUBCUTANEOUS at 08:33

## 2025-07-05 RX ADMIN — ARFORMOTEROL TARTRATE 15 MCG: 15 SOLUTION RESPIRATORY (INHALATION) at 19:44

## 2025-07-05 RX ADMIN — INSULIN GLARGINE 10 UNITS: 100 INJECTION, SOLUTION SUBCUTANEOUS at 20:28

## 2025-07-05 RX ADMIN — CEFEPIME 2000 MG: 2 INJECTION, POWDER, FOR SOLUTION INTRAVENOUS at 08:31

## 2025-07-05 RX ADMIN — HEPARIN SODIUM 5000 UNITS: 5000 INJECTION INTRAVENOUS; SUBCUTANEOUS at 20:27

## 2025-07-05 RX ADMIN — SODIUM CHLORIDE SOLN NEBU 7% 4 ML: 7 NEBU SOLN at 09:08

## 2025-07-05 RX ADMIN — FERROUS SULFATE TAB 325 MG (65 MG ELEMENTAL FE) 325 MG: 325 (65 FE) TAB at 08:34

## 2025-07-05 RX ADMIN — HEPARIN SODIUM 5000 UNITS: 5000 INJECTION INTRAVENOUS; SUBCUTANEOUS at 08:33

## 2025-07-05 RX ADMIN — DILTIAZEM HYDROCHLORIDE 360 MG: 180 CAPSULE, EXTENDED RELEASE ORAL at 08:33

## 2025-07-05 RX ADMIN — PANTOPRAZOLE SODIUM 40 MG: 40 TABLET, DELAYED RELEASE ORAL at 05:03

## 2025-07-05 RX ADMIN — BUDESONIDE 0.5 MG: 0.5 SUSPENSION RESPIRATORY (INHALATION) at 19:45

## 2025-07-05 RX ADMIN — CARVEDILOL 12.5 MG: 12.5 TABLET, FILM COATED ORAL at 17:33

## 2025-07-05 RX ADMIN — BUDESONIDE 0.5 MG: 0.5 SUSPENSION RESPIRATORY (INHALATION) at 09:08

## 2025-07-05 RX ADMIN — ALBUTEROL SULFATE 2.5 MG: 2.5 SOLUTION RESPIRATORY (INHALATION) at 17:54

## 2025-07-05 RX ADMIN — INSULIN LISPRO 3 UNITS: 100 INJECTION, SOLUTION INTRAVENOUS; SUBCUTANEOUS at 08:32

## 2025-07-05 RX ADMIN — INSULIN LISPRO 4 UNITS: 100 INJECTION, SOLUTION INTRAVENOUS; SUBCUTANEOUS at 17:32

## 2025-07-05 RX ADMIN — SODIUM CHLORIDE SOLN NEBU 7% 4 ML: 7 NEBU SOLN at 19:46

## 2025-07-05 RX ADMIN — ARFORMOTEROL TARTRATE 15 MCG: 15 SOLUTION RESPIRATORY (INHALATION) at 09:08

## 2025-07-05 RX ADMIN — INSULIN LISPRO 8 UNITS: 100 INJECTION, SOLUTION INTRAVENOUS; SUBCUTANEOUS at 17:33

## 2025-07-05 RX ADMIN — GUAIFENESIN 1200 MG: 600 TABLET, EXTENDED RELEASE ORAL at 08:33

## 2025-07-05 RX ADMIN — INSULIN LISPRO 3 UNITS: 100 INJECTION, SOLUTION INTRAVENOUS; SUBCUTANEOUS at 13:05

## 2025-07-05 RX ADMIN — GUAIFENESIN 1200 MG: 600 TABLET, EXTENDED RELEASE ORAL at 20:27

## 2025-07-05 RX ADMIN — ASPIRIN 81 MG: 81 TABLET, COATED ORAL at 08:33

## 2025-07-05 RX ADMIN — Medication 10 ML: at 08:34

## 2025-07-05 RX ADMIN — CARVEDILOL 12.5 MG: 12.5 TABLET, FILM COATED ORAL at 08:33

## 2025-07-05 RX ADMIN — TERAZOSIN HYDROCHLORIDE 2 MG: 2 CAPSULE ORAL at 20:27

## 2025-07-05 NOTE — PROGRESS NOTES
[facility] Medicine Services  PROGRESS NOTE    Patient Name: Baldemar Anderson  : 1950  MRN: [id number]    Date of Admission: 2025  Primary Care Physician: Aparna Mendoza MD    Subjective   Subjective     CC:  Follow-up CHF, COPD    HPI:  Patient was seen resting in bed no apparent distress.  No acute events overnight per nursing.  Patient reports breathing is about the same today.  A.m. labs pending.  We discussed tentative plan for left heart cath on Monday.  No new complaints at this time.      Objective   Objective     Vital Signs:   Temp:  [97.6 °F (36.4 °C)-98.3 °F (36.8 °C)] 97.9 °F (36.6 °C)  Heart Rate:  [57-73] 63  Resp:  [18] 18  BP: (147-163)/(62-69) 150/63  Flow (L/min) (Oxygen Therapy):  [4] 4     Physical Exam:  Constitutional: No acute distress, awake, alert, chronically ill-appearing, pleasant, intermittent tremor  HENT: NCAT, mucous membranes moist  Respiratory: Grossly clear, diminished in bases, no wheezing noted, respiratory effort normal on 4 L  Cardiovascular: RRR, + murmur, cap refill brisk   Gastrointestinal: Positive bowel sounds, soft, nontender, nondistended  Musculoskeletal: No BLE edema   Psychiatric: Appropriate affect, cooperative  Neurologic: Oriented x 3, moves all extremities, speech clear  Skin: warm, dry, no visible rash     Results Reviewed:  LAB RESULTS:      Lab 25  0644 25  1224 25  1148 25  0542 25  0429 25  0100 25  1007   WBC 17.97*  --   --  19.63*  --  21.10*  --    HEMOGLOBIN 10.9*  --   --  11.1*  --  9.8*  --    HEMATOCRIT 36.1*  --   --  36.8*  --  32.7*  --    PLATELETS 403  --   --  409  --  406  --    NEUTROS ABS 15.89*  --   --  16.12*  --  19.57*  --    IMMATURE GRANS (ABS) 0.27*  --   --  0.37*  --  0.34*  --    LYMPHS ABS 0.93  --   --  1.67  --  0.61*  --    MONOS ABS 0.85  --   --  1.38*  --  0.54  --    EOS ABS 0.00  --   --  0.04  --  0.00  --    MCV 84.5  --   --  86.2  --   85.6  --    SED RATE  --  >130*  --   --   --   --   --    CRP  --   --  1.50*  --   --   --   --    PROCALCITONIN  --   --   --   --   --   --  0.72*   PROTIME  --   --   --   --  15.5*  --   --    HSTROP T  --   --   --   --  37* 34*  --          Lab 07/04/25  0523 07/03/25  0644 07/02/25  0542 07/01/25  0429 07/01/25  0100   SODIUM 142 140 143  --  138   POTASSIUM 4.9 5.0 4.7  --  4.6   CHLORIDE 110* 108* 110*  --  105   CO2 21.0* 21.0* 24.0  --  18.5*   ANION GAP 11.0 11.0 9.0  --  14.5   BUN 90.9* 82.4* 74.8*  --  75.5*   CREATININE 2.35* 2.08* 1.97*  --  2.89*   EGFR 28.3* 32.8* 35.0*  --  22.1*   GLUCOSE 200* 166* 116*  --  320*   CALCIUM 8.7 8.9 8.9  --  8.6   MAGNESIUM  --   --   --  2.3 2.2   HEMOGLOBIN A1C  --   --   --   --  7.33*   TSH  --   --   --  0.630  --          Lab 07/01/25  0100   TOTAL PROTEIN 6.7   ALBUMIN 2.8*   GLOBULIN 3.9   ALT (SGPT) 37   AST (SGOT) 21   BILIRUBIN <0.2   ALK PHOS 70         Lab 07/03/25  1301 07/01/25  0429 07/01/25  0100   PROBNP 2,989.0* 4,325.0*  --    HSTROP T  --  37* 34*   PROTIME  --  15.5*  --    INR  --  1.16*  --                  Brief Urine Lab Results       None            Microbiology Results Abnormal       None            No radiology results from the last 24 hrs    Results for orders placed during the hospital encounter of 06/30/25    Adult Transthoracic Echo Complete W/ Cont if Necessary Per Protocol 07/01/2025  3:03 PM    Interpretation Summary    Left ventricular systolic function is normal. Calculated left ventricular EF = 61.5%    Left ventricular wall thickness is consistent with hypertrophy.    Left ventricular diastolic function was normal.    The left atrial cavity is dilated.    Left atrial volume is mildly increased.    Moderate aortic valve stenosis is present.    Aortic valve maximum pressure gradient is 38 mmHg. Aortic valve mean pressure gradient is 20 mmHg.    Systolic anterior motion of the anterior mitral leaflet is present.      Current  medications:  Scheduled Meds:arformoterol, 15 mcg, Nebulization, BID - RT  aspirin, 81 mg, Oral, Daily  budesonide, 0.5 mg, Nebulization, BID - RT  carvedilol, 12.5 mg, Oral, BID With Meals  cefepime, 2,000 mg, Intravenous, Q12H  dilTIAZem CD, 360 mg, Oral, Q24H  ferrous sulfate, 325 mg, Oral, Daily With Breakfast  guaiFENesin, 1,200 mg, Oral, Q12H  heparin (porcine), 5,000 Units, Subcutaneous, Q12H  insulin glargine, 10 Units, Subcutaneous, Nightly  insulin lispro, 2-7 Units, Subcutaneous, 4x Daily AC & at Bedtime  Insulin Lispro, 8 Units, Subcutaneous, TID With Meals  methylPREDNISolone sodium succinate, 60 mg, Intravenous, Q24H  pantoprazole, 40 mg, Oral, Q AM  pravastatin, 20 mg, Oral, Nightly  sodium chloride, 10 mL, Intravenous, Q12H  sodium chloride, 4 mL, Nebulization, BID - RT  terazosin, 2 mg, Oral, Nightly  [Held by provider] torsemide, 20 mg, Oral, Daily      Continuous Infusions:Pharmacy To Dose:,       PRN Meds:.  acetaminophen **OR** acetaminophen **OR** acetaminophen    albuterol    albuterol    senna-docusate sodium **AND** polyethylene glycol **AND** bisacodyl **AND** bisacodyl    dextrose    dextrose    glucagon (human recombinant)    nitroglycerin    Pharmacy To Dose:    sodium chloride    sodium chloride    temazepam    Assessment & Plan   Assessment & Plan     Active Hospital Problems    Diagnosis  POA    **Acute diastolic heart failure [I50.31]  Yes    Acute respiratory failure with hypoxia [J96.01]  Yes    Anemia [D64.9]  Yes    CHF (congestive heart failure) [I50.9]  Yes    Chronic kidney disease [N18.9]  Yes    COPD (chronic obstructive pulmonary disease) [J44.9]  Yes    Diabetes mellitus [E11.9]  Yes    Dyslipidemia [E78.5]  Yes    Hypertension [I10]  Yes      Resolved Hospital Problems   No resolved problems to display.        Brief Hospital Course to date:  Baldemar Anderson is a 75 y.o. male with a past medical history of COPD, type 2 diabetes, hypertension, CKD, former tobacco use  disorder, chronic anemia, and dyslipidemia. He was transferred from [facility] for evaluation of a TAVR.  On presentation, he was found to have acute hypoxic respiratory failure.  Pulmonology was consulted and recommended treatment for COPD exacerbation with IV Solu-Medrol, antibiotics, and scheduled nebulizers treatments.  Cardiology was consulted and is tentatively planning for left heart catheterization on Monday, 7/7/2025    This patient's problems and plans were partially entered by my partner and updated as appropriate by me 07/05/25.     Acute respiratory failure with hypoxia  Acute on chronic diastolic heart failure  Hypertension  Moderate aortic stenosis  -s/p high flow NC, currently on 4L NC   - strict I&O's  - daily weights  -Cards following   -s/p intermittent diuresis, last dose 7/3   - Repeat echo on 7/1 showed EF of 61.5%, left ventricular hypertrophy, normal diastolic function, moderate aortic valve stenosis.  Cardiology has reviewed and does not suspect that aortic stenosis is the full picture for his hypoxemia since the aortic valve opens adequately on echo.    -Tentative plan for left heart cath on Monday 7/7 but pulm believes all this to be COPD,   -Pulmonology following as below, fibrosis labs sent and are pending  --VQ scan without mismatch  - AM labs      COPD  Possible pneumonia  - continue DuoNebs, Symbicort, and Pulmicort  - treated with steroids at OSH.  Steroids were DCd on 6/30  -continue Solu-Medrol 60 mg daily  - Added Brovana and budesonide nebs  - continue cefepime through 7/5  - BC x 2 and sputum culture negative  -fibrosis labs sent and are pending   -Pulmonary following, will see again on Monday      Acute on chronic renal failure, stage 3b.  WATCH hyperkalemia  - Baseline creatinine is 2.1, creatinine up to 2.35 at last check   - Avoid nephrotoxic medications  - Monitor I's and O's and daily weights  - holding lisinopril, torsemide  -Diuresis per cardiology,  last dose lasix 7/3  -AM labs pending, low threshhold to consult nephrology renal function worsens   -AM labs      Hyponatremia.-- resolved  - Sodium is 127 at OSH  - was given samsca at OSH  - Na 138 on arrival here and stable at 142 at last check     Type 2 diabetes.  - Holding glimepiride  --A1c is 7.3  -Continue Lantus, SSI  -Increase Preprandial humalog to 8 units TID      Chronic anemia.  - Hgb 10.9 at last check   - no overt signs of bleeding  - continue to monitor        Expected Discharge Location and Transportation: TBD  Expected Discharge   Expected Discharge Date: 7/7/2025; Expected Discharge Time:       VTE Prophylaxis:  Pharmacologic VTE prophylaxis orders are present.         AM-PAC 6 Clicks Score (PT): 19 (07/04/25 2000)    CODE STATUS:   Code Status and Medical Interventions: CPR (Attempt to Resuscitate); Full Support   Ordered at: 07/01/25 0243     Code Status (Patient has no pulse and is not breathing):    CPR (Attempt to Resuscitate)     Medical Interventions (Patient has pulse or is breathing):    Full Support     Level Of Support Discussed With:    Patient       Femi Jenkins, APRN  07/05/25

## 2025-07-05 NOTE — PROGRESS NOTES
"[facility] Cardiology Daily Note       LOS: 4 days   Patient Care Team:  Aparna Mendoza MD as PCP - General (Family Medicine)    Chief Complaint: Aortic stenosis/pulmonary edema    Subjective     Subjective: No complaints.  Has walked a long way up and down the halls this morning.  98% on 4.5 L nasal cannula.  Blood pressures have been slightly elevated overnight.  Heart rates in the 60s.  Afebrile.      Review of Systems:   As above.    Medications:  arformoterol, 15 mcg, Nebulization, BID - RT  aspirin, 81 mg, Oral, Daily  budesonide, 0.5 mg, Nebulization, BID - RT  carvedilol, 12.5 mg, Oral, BID With Meals  cefepime, 2,000 mg, Intravenous, Q12H  dilTIAZem CD, 360 mg, Oral, Q24H  ferrous sulfate, 325 mg, Oral, Daily With Breakfast  guaiFENesin, 1,200 mg, Oral, Q12H  heparin (porcine), 5,000 Units, Subcutaneous, Q12H  insulin glargine, 10 Units, Subcutaneous, Nightly  insulin lispro, 2-7 Units, Subcutaneous, 4x Daily AC & at Bedtime  Insulin Lispro, 8 Units, Subcutaneous, TID With Meals  methylPREDNISolone sodium succinate, 60 mg, Intravenous, Q24H  pantoprazole, 40 mg, Oral, Q AM  pravastatin, 20 mg, Oral, Nightly  sodium chloride, 10 mL, Intravenous, Q12H  sodium chloride, 4 mL, Nebulization, BID - RT  terazosin, 2 mg, Oral, Nightly  [Held by provider] torsemide, 20 mg, Oral, Daily        Objective     Vital Sign Min/Max for last 24 hours  Temp  Min: 97.6 °F (36.4 °C)  Max: 98.3 °F (36.8 °C)   BP  Min: 147/66  Max: 163/67   Pulse  Min: 57  Max: 73   Resp  Min: 18  Max: 18   SpO2  Min: 97 %  Max: 100 %   Flow (L/min) (Oxygen Therapy)  Min: 4  Max: 4.5   Weight  Min: 80.5 kg (177 lb 7.5 oz)  Max: 80.5 kg (177 lb 7.5 oz)      Intake/Output Summary (Last 24 hours) at 7/5/2025 1119  Last data filed at 7/5/2025 0831  Gross per 24 hour   Intake 100 ml   Output 1000 ml   Net -900 ml        Flowsheet Rows      Flowsheet Row First Filed Value   Admission Height 180.3 cm (71\") Documented at 06/30/2025 " Dr Jose Tan office called regarding pt MRI it says future on the referral but I do not see an MRI scheduled?     They close at 3, press option 1 "2119   Admission Weight 84.3 kg (185 lb 13.6 oz) Documented at 06/30/2025 2119            Physical Exam:    General: Alert and oriented.   Cardiovascular: Heart has a nondisplaced focal PMI. Regular rate and rhythm with 2/6 SE murmur, no gallop or rub.  Lungs: No rales or wheezes are heard.  Equal expansion is noted.   Abdomen: Soft, nontender.  Extremities: Show no edema.   Skin: warm and dry.     Results Review:    I reviewed the patient's new clinical results.  EKG:  Tele: Sinus rhythm    Labs:    Results from last 7 days   Lab Units 07/04/25  0523 07/03/25  0644 07/02/25  0542 07/01/25  0100   SODIUM mmol/L 142 140 143 138   POTASSIUM mmol/L 4.9 5.0 4.7 4.6   CHLORIDE mmol/L 110* 108* 110* 105   CO2 mmol/L 21.0* 21.0* 24.0 18.5*   BUN mg/dL 90.9* 82.4* 74.8* 75.5*   CREATININE mg/dL 2.35* 2.08* 1.97* 2.89*   CALCIUM mg/dL 8.7 8.9 8.9 8.6   BILIRUBIN mg/dL  --   --   --  <0.2   ALK PHOS U/L  --   --   --  70   ALT (SGPT) U/L  --   --   --  37   AST (SGOT) U/L  --   --   --  21   GLUCOSE mg/dL 200* 166* 116* 320*     Results from last 7 days   Lab Units 07/05/25  1022 07/03/25  0644 07/02/25  0542   WBC 10*3/mm3 14.30* 17.97* 19.63*   HEMOGLOBIN g/dL 10.0* 10.9* 11.1*   HEMATOCRIT % 32.8* 36.1* 36.8*   PLATELETS 10*3/mm3 306 403 409     Lab Results   Component Value Date    TROPONINT 37 (H) 07/01/2025    TROPONINT 34 (H) 07/01/2025     No results found for: \"CHOL\"  No results found for: \"TRIG\"  No results found for: \"HDL\"  No components found for: \"LDLCALC\"  Lab Results   Component Value Date    INR 1.16 (H) 07/01/2025    INR 1.06 06/27/2025    INR 1.04 06/21/2025    PROTIME 15.5 (H) 07/01/2025    PROTIME 10.8 06/27/2025    PROTIME 10.9 06/21/2025         Ejection Fraction:    Assessment   Assessment:    Valvular heart disease   Echo 1/2025: Moderate aortic stenosis  Echo 6/30/2025 (outside facility): Report states severe aortic valve stenosis, normal EF but the mean aortic valve gradient is only 22 " mmHg.  HFpEF  Echo 6/30/2025 (outside facility): Report states severe aortic valve stenosis, normal EF however the mean aortic valve gradient is only 22 mmHg.  Hypoxemia/possible interstitial lung disease/pneumonia  Hypertension  Hyperlipidemia  Diabetes mellitus  Chronic kidney disease  COPD.  Possible interstitial lung disease versus fluid  Chronic anemia    Plan:    He is borderline for low-flow low gradient aortic stenosis with a stroke-volume index of 35 mL/min/m2  however the aortic valve appears to open adequately on echo images.    He has multiple risk factors for coronary artery disease and needs to undergo cardiac catheterization prior to any additional workup for his aortic stenosis.  If no significant findings are present at the time of cardiac catheterization then further pulmonary workup should be performed for likely pulmonary fibrosis prior to considering him for AVR.  Hypoxemia may have been the driving force for his pulmonary edema.    Continue carvedilol diltiazem and terazosin at the current dose.    Left heart catheterization on Monday, 7/7/2025.  Further recommendations will follow.    Roseann Carrillo MD  07/05/25  11:19 EDT

## 2025-07-05 NOTE — PLAN OF CARE
Goal Outcome Evaluation:              Outcome Evaluation: Pt A&Ox3- forgetful to time. VSS. NSR. 3LNC with sat above 88%. Patient plan for heart cath Monday. Patient currently in chair relaxing with call light in reach.

## 2025-07-05 NOTE — PLAN OF CARE
Goal Outcome Evaluation:      Patient still non compliant with o2 overnight, but is easily redirectable/ A&Ox2 this shift, on baseline 4LNC. No acute overnight events. Call light within reach, bed alarm set.

## 2025-07-06 LAB
ANION GAP SERPL CALCULATED.3IONS-SCNC: 9 MMOL/L (ref 5–15)
BACTERIA SPEC AEROBE CULT: NORMAL
BACTERIA SPEC AEROBE CULT: NORMAL
BASOPHILS # BLD AUTO: 0.02 10*3/MM3 (ref 0–0.2)
BASOPHILS NFR BLD AUTO: 0.1 % (ref 0–1.5)
BUN SERPL-MCNC: 88.4 MG/DL (ref 8–23)
BUN/CREAT SERPL: 49.1 (ref 7–25)
CALCIUM SPEC-SCNC: 8.9 MG/DL (ref 8.6–10.5)
CHLORIDE SERPL-SCNC: 113 MMOL/L (ref 98–107)
CO2 SERPL-SCNC: 20 MMOL/L (ref 22–29)
CREAT SERPL-MCNC: 1.8 MG/DL (ref 0.76–1.27)
DEPRECATED RDW RBC AUTO: 59.7 FL (ref 37–54)
EGFRCR SERPLBLD CKD-EPI 2021: 39 ML/MIN/1.73
EOSINOPHIL # BLD AUTO: 0 10*3/MM3 (ref 0–0.4)
EOSINOPHIL NFR BLD AUTO: 0 % (ref 0.3–6.2)
ERYTHROCYTE [DISTWIDTH] IN BLOOD BY AUTOMATED COUNT: 19.3 % (ref 12.3–15.4)
FERRITIN SERPL-MCNC: 356 NG/ML (ref 30–400)
GLUCOSE BLDC GLUCOMTR-MCNC: 134 MG/DL (ref 70–130)
GLUCOSE BLDC GLUCOMTR-MCNC: 193 MG/DL (ref 70–130)
GLUCOSE BLDC GLUCOMTR-MCNC: 247 MG/DL (ref 70–130)
GLUCOSE BLDC GLUCOMTR-MCNC: 275 MG/DL (ref 70–130)
GLUCOSE BLDC GLUCOMTR-MCNC: 294 MG/DL (ref 70–130)
GLUCOSE SERPL-MCNC: 164 MG/DL (ref 65–99)
HCT VFR BLD AUTO: 33 % (ref 37.5–51)
HGB BLD-MCNC: 10.2 G/DL (ref 13–17.7)
IMM GRANULOCYTES # BLD AUTO: 0.28 10*3/MM3 (ref 0–0.05)
IMM GRANULOCYTES NFR BLD AUTO: 1.9 % (ref 0–0.5)
IRON 24H UR-MRATE: 116 MCG/DL (ref 59–158)
IRON SATN MFR SERPL: 36 % (ref 20–50)
LYMPHOCYTES # BLD AUTO: 0.58 10*3/MM3 (ref 0.7–3.1)
LYMPHOCYTES NFR BLD AUTO: 3.9 % (ref 19.6–45.3)
MCH RBC QN AUTO: 26.4 PG (ref 26.6–33)
MCHC RBC AUTO-ENTMCNC: 30.9 G/DL (ref 31.5–35.7)
MCV RBC AUTO: 85.5 FL (ref 79–97)
MONOCYTES # BLD AUTO: 0.38 10*3/MM3 (ref 0.1–0.9)
MONOCYTES NFR BLD AUTO: 2.5 % (ref 5–12)
NEUTROPHILS NFR BLD AUTO: 13.76 10*3/MM3 (ref 1.7–7)
NEUTROPHILS NFR BLD AUTO: 91.6 % (ref 42.7–76)
NRBC BLD AUTO-RTO: 0 /100 WBC (ref 0–0.2)
NT-PROBNP SERPL-MCNC: 1029 PG/ML (ref 0–900)
PLATELET # BLD AUTO: 314 10*3/MM3 (ref 140–450)
PMV BLD AUTO: 9.8 FL (ref 6–12)
POTASSIUM SERPL-SCNC: 4.8 MMOL/L (ref 3.5–5.2)
RBC # BLD AUTO: 3.86 10*6/MM3 (ref 4.14–5.8)
SODIUM SERPL-SCNC: 142 MMOL/L (ref 136–145)
TIBC SERPL-MCNC: 323 MCG/DL (ref 298–536)
TRANSFERRIN SERPL-MCNC: 217 MG/DL (ref 200–360)
WBC NRBC COR # BLD AUTO: 15.02 10*3/MM3 (ref 3.4–10.8)

## 2025-07-06 PROCEDURE — 63710000001 INSULIN GLARGINE PER 5 UNITS: Performed by: PHYSICIAN ASSISTANT

## 2025-07-06 PROCEDURE — 82948 REAGENT STRIP/BLOOD GLUCOSE: CPT

## 2025-07-06 PROCEDURE — 94664 DEMO&/EVAL PT USE INHALER: CPT

## 2025-07-06 PROCEDURE — 82728 ASSAY OF FERRITIN: CPT | Performed by: PHYSICIAN ASSISTANT

## 2025-07-06 PROCEDURE — 99232 SBSQ HOSP IP/OBS MODERATE 35: CPT | Performed by: PHYSICIAN ASSISTANT

## 2025-07-06 PROCEDURE — 25010000002 HYDRALAZINE PER 20 MG: Performed by: NURSE PRACTITIONER

## 2025-07-06 PROCEDURE — 85025 COMPLETE CBC W/AUTO DIFF WBC: CPT | Performed by: NURSE PRACTITIONER

## 2025-07-06 PROCEDURE — 83880 ASSAY OF NATRIURETIC PEPTIDE: CPT | Performed by: PHYSICIAN ASSISTANT

## 2025-07-06 PROCEDURE — 63710000001 INSULIN LISPRO (HUMAN) PER 5 UNITS: Performed by: NURSE PRACTITIONER

## 2025-07-06 PROCEDURE — 25010000002 HEPARIN (PORCINE) PER 1000 UNITS: Performed by: NURSE PRACTITIONER

## 2025-07-06 PROCEDURE — 84466 ASSAY OF TRANSFERRIN: CPT | Performed by: PHYSICIAN ASSISTANT

## 2025-07-06 PROCEDURE — 63710000001 INSULIN LISPRO (HUMAN) PER 5 UNITS: Performed by: INTERNAL MEDICINE

## 2025-07-06 PROCEDURE — 94761 N-INVAS EAR/PLS OXIMETRY MLT: CPT

## 2025-07-06 PROCEDURE — 80048 BASIC METABOLIC PNL TOTAL CA: CPT | Performed by: NURSE PRACTITIONER

## 2025-07-06 PROCEDURE — 94799 UNLISTED PULMONARY SVC/PX: CPT

## 2025-07-06 PROCEDURE — 25010000002 BUMETANIDE PER 0.5 MG: Performed by: PHYSICIAN ASSISTANT

## 2025-07-06 PROCEDURE — 99232 SBSQ HOSP IP/OBS MODERATE 35: CPT | Performed by: NURSE PRACTITIONER

## 2025-07-06 PROCEDURE — 83540 ASSAY OF IRON: CPT | Performed by: PHYSICIAN ASSISTANT

## 2025-07-06 PROCEDURE — 25010000002 METHYLPREDNISOLONE PER 125 MG: Performed by: PHYSICIAN ASSISTANT

## 2025-07-06 PROCEDURE — 99231 SBSQ HOSP IP/OBS SF/LOW 25: CPT | Performed by: PHYSICIAN ASSISTANT

## 2025-07-06 RX ORDER — HYDRALAZINE HYDROCHLORIDE 20 MG/ML
10 INJECTION INTRAMUSCULAR; INTRAVENOUS ONCE
Status: COMPLETED | OUTPATIENT
Start: 2025-07-06 | End: 2025-07-06

## 2025-07-06 RX ORDER — HYDRALAZINE HYDROCHLORIDE 25 MG/1
25 TABLET, FILM COATED ORAL EVERY 8 HOURS SCHEDULED
Status: DISCONTINUED | OUTPATIENT
Start: 2025-07-06 | End: 2025-07-09 | Stop reason: HOSPADM

## 2025-07-06 RX ORDER — METHYLPREDNISOLONE SODIUM SUCCINATE 125 MG/2ML
60 INJECTION, POWDER, LYOPHILIZED, FOR SOLUTION INTRAMUSCULAR; INTRAVENOUS EVERY 24 HOURS
Status: DISCONTINUED | OUTPATIENT
Start: 2025-07-06 | End: 2025-07-07

## 2025-07-06 RX ORDER — BUMETANIDE 0.25 MG/ML
1 INJECTION, SOLUTION INTRAMUSCULAR; INTRAVENOUS ONCE
Status: COMPLETED | OUTPATIENT
Start: 2025-07-06 | End: 2025-07-06

## 2025-07-06 RX ORDER — INSULIN LISPRO 100 [IU]/ML
12 INJECTION, SOLUTION INTRAVENOUS; SUBCUTANEOUS
Status: DISCONTINUED | OUTPATIENT
Start: 2025-07-06 | End: 2025-07-07

## 2025-07-06 RX ORDER — METHYLPREDNISOLONE SODIUM SUCCINATE 40 MG/ML
40 INJECTION, POWDER, LYOPHILIZED, FOR SOLUTION INTRAMUSCULAR; INTRAVENOUS EVERY 24 HOURS
Status: DISCONTINUED | OUTPATIENT
Start: 2025-07-06 | End: 2025-07-06

## 2025-07-06 RX ADMIN — GUAIFENESIN 1200 MG: 600 TABLET, EXTENDED RELEASE ORAL at 08:41

## 2025-07-06 RX ADMIN — PRAVASTATIN SODIUM 20 MG: 20 TABLET ORAL at 20:25

## 2025-07-06 RX ADMIN — INSULIN LISPRO 2 UNITS: 100 INJECTION, SOLUTION INTRAVENOUS; SUBCUTANEOUS at 12:33

## 2025-07-06 RX ADMIN — INSULIN LISPRO 4 UNITS: 100 INJECTION, SOLUTION INTRAVENOUS; SUBCUTANEOUS at 20:25

## 2025-07-06 RX ADMIN — GUAIFENESIN 1200 MG: 600 TABLET, EXTENDED RELEASE ORAL at 20:26

## 2025-07-06 RX ADMIN — HEPARIN SODIUM 5000 UNITS: 5000 INJECTION INTRAVENOUS; SUBCUTANEOUS at 08:39

## 2025-07-06 RX ADMIN — SODIUM CHLORIDE SOLN NEBU 7% 4 ML: 7 NEBU SOLN at 11:33

## 2025-07-06 RX ADMIN — HYDRALAZINE HYDROCHLORIDE 25 MG: 25 TABLET ORAL at 14:00

## 2025-07-06 RX ADMIN — Medication 10 ML: at 20:26

## 2025-07-06 RX ADMIN — BUDESONIDE 0.5 MG: 0.5 SUSPENSION RESPIRATORY (INHALATION) at 11:39

## 2025-07-06 RX ADMIN — SODIUM CHLORIDE SOLN NEBU 7% 4 ML: 7 NEBU SOLN at 20:59

## 2025-07-06 RX ADMIN — INSULIN LISPRO 12 UNITS: 100 INJECTION, SOLUTION INTRAVENOUS; SUBCUTANEOUS at 12:33

## 2025-07-06 RX ADMIN — CARVEDILOL 12.5 MG: 12.5 TABLET, FILM COATED ORAL at 17:14

## 2025-07-06 RX ADMIN — INSULIN LISPRO 3 UNITS: 100 INJECTION, SOLUTION INTRAVENOUS; SUBCUTANEOUS at 17:12

## 2025-07-06 RX ADMIN — INSULIN LISPRO 12 UNITS: 100 INJECTION, SOLUTION INTRAVENOUS; SUBCUTANEOUS at 17:13

## 2025-07-06 RX ADMIN — CARVEDILOL 12.5 MG: 12.5 TABLET, FILM COATED ORAL at 08:41

## 2025-07-06 RX ADMIN — INSULIN GLARGINE 12 UNITS: 100 INJECTION, SOLUTION SUBCUTANEOUS at 20:25

## 2025-07-06 RX ADMIN — ASPIRIN 81 MG: 81 TABLET, COATED ORAL at 08:41

## 2025-07-06 RX ADMIN — HYDRALAZINE HYDROCHLORIDE 25 MG: 25 TABLET ORAL at 22:12

## 2025-07-06 RX ADMIN — TERAZOSIN HYDROCHLORIDE 2 MG: 2 CAPSULE ORAL at 20:26

## 2025-07-06 RX ADMIN — BUDESONIDE 0.5 MG: 0.5 SUSPENSION RESPIRATORY (INHALATION) at 20:59

## 2025-07-06 RX ADMIN — Medication 10 ML: at 08:42

## 2025-07-06 RX ADMIN — ARFORMOTEROL TARTRATE 15 MCG: 15 SOLUTION RESPIRATORY (INHALATION) at 11:33

## 2025-07-06 RX ADMIN — DILTIAZEM HYDROCHLORIDE 360 MG: 180 CAPSULE, EXTENDED RELEASE ORAL at 08:41

## 2025-07-06 RX ADMIN — HYDRALAZINE HYDROCHLORIDE 25 MG: 25 TABLET ORAL at 08:41

## 2025-07-06 RX ADMIN — BUMETANIDE 1 MG: 0.25 INJECTION INTRAMUSCULAR; INTRAVENOUS at 16:06

## 2025-07-06 RX ADMIN — PANTOPRAZOLE SODIUM 40 MG: 40 TABLET, DELAYED RELEASE ORAL at 05:39

## 2025-07-06 RX ADMIN — HEPARIN SODIUM 5000 UNITS: 5000 INJECTION INTRAVENOUS; SUBCUTANEOUS at 20:26

## 2025-07-06 RX ADMIN — INSULIN LISPRO 12 UNITS: 100 INJECTION, SOLUTION INTRAVENOUS; SUBCUTANEOUS at 08:39

## 2025-07-06 RX ADMIN — ARFORMOTEROL TARTRATE 15 MCG: 15 SOLUTION RESPIRATORY (INHALATION) at 20:59

## 2025-07-06 RX ADMIN — HYDRALAZINE HYDROCHLORIDE 10 MG: 20 INJECTION INTRAMUSCULAR; INTRAVENOUS at 00:36

## 2025-07-06 RX ADMIN — FERROUS SULFATE TAB 325 MG (65 MG ELEMENTAL FE) 325 MG: 325 (65 FE) TAB at 08:41

## 2025-07-06 RX ADMIN — METHYLPREDNISOLONE SODIUM SUCCINATE 60 MG: 125 INJECTION INTRAMUSCULAR; INTRAVENOUS at 11:08

## 2025-07-06 NOTE — PROGRESS NOTES
[facility] Medicine Services  PROGRESS NOTE    Patient Name: Baldemar Anderson  : 1950  MRN: [id number]    Date of Admission: 2025  Primary Care Physician: Aparna Mendoza MD    Subjective     CC: f/u hypoxia     HPI:  In bed. Wife and two daughters at bedside. Dyspnea slowly improving - nursing has weaned O2 to 1L NC but unable to wean further. O2 sat 90-92% during my exam. Understands plan for [facility] tomorrow. Asks if he can go home afterwards - we discussed.     Objective     Vital Signs:   Temp:  [97.3 °F (36.3 °C)-98 °F (36.7 °C)] 97.9 °F (36.6 °C)  Heart Rate:  [53-69] 58  Resp:  [16-18] 18  BP: (154-182)/(58-87) 154/68  Flow (L/min) (Oxygen Therapy):  [1-4.5] 1     Physical Exam:  Constitutional: No acute distress, awake, alert and conversant. Sitting upright in bed with family at bedside.   HENT: NCAT, mucous membranes moist  Respiratory: R basilar rales, no wheezes or rhonchi, normal respiratory effort with sat 90-92% on 1L NC  Cardiovascular: RRR  Gastrointestinal: Positive bowel sounds, soft, nontender, nondistended  Musculoskeletal: No bilateral ankle edema  Psychiatric: Appropriate affect, cooperative with exam  Neurologic: Oriented x 3, moves all extremities spontaneously without focal deficits, speech clear    Results Reviewed:  LAB RESULTS:      Lab 25  0622 25  1022 25  0644 25  1224 25  1148 25  0542 25  0429 25  0100 25  1007   WBC 15.02* 14.30* 17.97*  --   --  19.63*  --  21.10*  --    HEMOGLOBIN 10.2* 10.0* 10.9*  --   --  11.1*  --  9.8*  --    HEMATOCRIT 33.0* 32.8* 36.1*  --   --  36.8*  --  32.7*  --    PLATELETS 314 306 403  --   --  409  --  406  --    NEUTROS ABS 13.76* 12.83* 15.89*  --   --  16.12*  --  19.57*  --    IMMATURE GRANS (ABS) 0.28* 0.19* 0.27*  --   --  0.37*  --  0.34*  --    LYMPHS ABS 0.58* 0.63* 0.93  --   --  1.67  --  0.61*  --    MONOS ABS 0.38 0.64 0.85  --   --  1.38*  --   0.54  --    EOS ABS 0.00 0.00 0.00  --   --  0.04  --  0.00  --    MCV 85.5 87.7 84.5  --   --  86.2  --  85.6  --    SED RATE  --   --   --  >130*  --   --   --   --   --    CRP  --   --   --   --  1.50*  --   --   --   --    PROCALCITONIN  --   --   --   --   --   --   --   --  0.72*   PROTIME  --   --   --   --   --   --  15.5*  --   --    HSTROP T  --   --   --   --   --   --  37* 34*  --          Lab 07/06/25  0622 07/05/25  1022 07/04/25  0523 07/03/25  0644 07/02/25  0542 07/01/25  0429 07/01/25  0100   SODIUM 142 143 142 140 143  --  138   POTASSIUM 4.8 4.7 4.9 5.0 4.7  --  4.6   CHLORIDE 113* 114* 110* 108* 110*  --  105   CO2 20.0* 18.2* 21.0* 21.0* 24.0  --  18.5*   ANION GAP 9.0 10.8 11.0 11.0 9.0  --  14.5   BUN 88.4* 85.3* 90.9* 82.4* 74.8*  --  75.5*   CREATININE 1.80* 2.13* 2.35* 2.08* 1.97*  --  2.89*   EGFR 39.0* 31.9* 28.3* 32.8* 35.0*  --  22.1*   GLUCOSE 164* 229* 200* 166* 116*  --  320*   CALCIUM 8.9 8.7 8.7 8.9 8.9  --  8.6   MAGNESIUM  --  2.0  --   --   --  2.3 2.2   HEMOGLOBIN A1C  --   --   --   --   --   --  7.33*   TSH  --   --   --   --   --  0.630  --          Lab 07/01/25  0100   TOTAL PROTEIN 6.7   ALBUMIN 2.8*   GLOBULIN 3.9   ALT (SGPT) 37   AST (SGOT) 21   BILIRUBIN <0.2   ALK PHOS 70         Lab 07/03/25  1301 07/01/25  0429 07/01/25  0100   PROBNP 2,989.0* 4,325.0*  --    HSTROP T  --  37* 34*   PROTIME  --  15.5*  --    INR  --  1.16*  --      Brief Urine Lab Results       None          Microbiology Results Abnormal       None          No radiology results from the last 24 hrs    Results for orders placed during the hospital encounter of 06/30/25    Adult Transthoracic Echo Complete W/ Cont if Necessary Per Protocol 07/01/2025  3:03 PM    Interpretation Summary    Left ventricular systolic function is normal. Calculated left ventricular EF = 61.5%    Left ventricular wall thickness is consistent with hypertrophy.    Left ventricular diastolic function was normal.    The left  atrial cavity is dilated.    Left atrial volume is mildly increased.    Moderate aortic valve stenosis is present.    Aortic valve maximum pressure gradient is 38 mmHg. Aortic valve mean pressure gradient is 20 mmHg.    Systolic anterior motion of the anterior mitral leaflet is present.    Current medications:  Scheduled Meds:arformoterol, 15 mcg, Nebulization, BID - RT  aspirin, 81 mg, Oral, Daily  budesonide, 0.5 mg, Nebulization, BID - RT  carvedilol, 12.5 mg, Oral, BID With Meals  dilTIAZem CD, 360 mg, Oral, Q24H  ferrous sulfate, 325 mg, Oral, Daily With Breakfast  guaiFENesin, 1,200 mg, Oral, Q12H  heparin (porcine), 5,000 Units, Subcutaneous, Q12H  hydrALAZINE, 25 mg, Oral, Q8H  insulin glargine, 10 Units, Subcutaneous, Nightly  Insulin Lispro, 12 Units, Subcutaneous, TID With Meals  insulin lispro, 2-7 Units, Subcutaneous, 4x Daily AC & at Bedtime  methylPREDNISolone sodium succinate, 60 mg, Intravenous, Q24H  pantoprazole, 40 mg, Oral, Q AM  pravastatin, 20 mg, Oral, Nightly  sodium chloride, 10 mL, Intravenous, Q12H  sodium chloride, 4 mL, Nebulization, BID - RT  terazosin, 2 mg, Oral, Nightly  [Held by provider] torsemide, 20 mg, Oral, Daily      Continuous Infusions:Pharmacy To Dose:,       PRN Meds:.  acetaminophen **OR** acetaminophen **OR** acetaminophen    albuterol    albuterol    senna-docusate sodium **AND** polyethylene glycol **AND** bisacodyl **AND** bisacodyl    dextrose    dextrose    glucagon (human recombinant)    nitroglycerin    Pharmacy To Dose:    sodium chloride    sodium chloride    temazepam    Assessment & Plan   Assessment & Plan     Active Hospital Problems    Diagnosis  POA    **Acute diastolic heart failure [I50.31]  Yes    Acute respiratory failure with hypoxia [J96.01]  Yes    Anemia [D64.9]  Yes    CHF (congestive heart failure) [I50.9]  Yes    Chronic kidney disease [N18.9]  Yes    COPD (chronic obstructive pulmonary disease) [J44.9]  Yes    Diabetes mellitus [E11.9]  Yes     Dyslipidemia [E78.5]  Yes    Hypertension [I10]  Yes      Resolved Hospital Problems   No resolved problems to display.     Brief Hospital Course to date:  Baldemar Anderson is a 75 y.o. male with PMH significant for HTN, HLD, chronic HFpEF, AS, PAF (not anticoagulated 2/2 history of GIB), chronic respiratory failure 2/2 COPD with prior tobacco abuse (baseline 3-4L NC), DMII, CKD III (baseline creatinine 1.8-2.1) and chronic anemia. He was admitted to [facility] on 6/29/25 for acute respiratory failure with hypoxia felt to be secondary to a/c HFpEF. Pulmonology evaluated and recommended transfer to tertiary center for TAVR evaluation given AS on ECHO. He was transferred to [facility] for higher level of care. Cardiology and pulmonology following      Acute on chronic respiratory failure with hypoxia  Acute on chronic diastolic heart failure  Hypertension  Moderate aortic stenosis  - Per review of records, baseline O2 requirement is 3-4L NC  - Required HFNC - had weaned to 4L NC but has since weaned further to 1L NC   - Cardiology, CT surgery and pulmonology following - considering TAVR work up   - VQ scan reassuring   - 7/1/25 ECHO showed EF 61%, LV hypertrophy with normal diastolic function, moderate aortic valve stenosis.   - Cardiology has reviewed ECHO - per cards, the aortic valve appears to open adequately   - s/p intermittent diuresis (was diuresed at [facility] as well)  - Pulmonology following - on IV Solumedrol, hypertonic saline nebs and Mucinex  - [facility] planned for 7/7     COPD  Possible pneumonia  - Continue DuoNebs, Pulmicort and Brovana nebs   - Continue Mucinex  - On IV Solumedrol 60mg daily per pulmonology  - s/p IV Cefepime (completed 7/5)  - Blood culture x 2 and sputum culture NGTD  - MRSA PCR / S. Phemo and Legionella urine Ags not collected  - Fibrosis labs send and are pending    GERARD on CKD IIIb, resolved  - Per review of records, baseline creatinine 1.8-2.1  -Creatinine 2.89 on  arrival to [facility]   - Home Lisinopril and Torsemide on hold  - s/p intermittent diuresis  -Watch renal function with impending LHC on 7/7    Uncontrolled HTN  - Cardiology adjusting meds  - Continue Carvedilol, Diltiazem, Hydralazine and Terazosin  - Torsemide on hold  - Watch BP when steroids tapered      Hyponatremia, resolved   - Sodium is 127 at OSH - s/p Tolvaptan  - Na 138 on arrival and has remained stable      Type 2 diabetes.  - A1c 7.3% - home Glimepiride on hold  - With expected steroid-induced hyperglycemia  - Increase Lantus to 12 10 units QHS  - Increase Lispro to 12 units TID AC + SSI  - Adjust insulin when steroids tapered     Chronic anemia.  - Hgb stable around ~10   - No overt bleeding noted   - On PO iron   - Check iron studies      Expected Discharge Location and Transportation: home vs SNF   Expected Discharge Expected Discharge Date: 7/9/2025; Expected Discharge Time:       VTE Prophylaxis:Pharmacologic VTE prophylaxis orders are present.    AM-PAC 6 Clicks Score (PT): 19 (07/05/25 2738)    CODE STATUS:   Code Status and Medical Interventions: CPR (Attempt to Resuscitate); Full Support   Ordered at: 07/01/25 0243     Code Status (Patient has no pulse and is not breathing):    CPR (Attempt to Resuscitate)     Medical Interventions (Patient has pulse or is breathing):    Full Support     Level Of Support Discussed With:    Patient     Heather Astorga PA-C  07/06/25

## 2025-07-06 NOTE — PLAN OF CARE
Goal Outcome Evaluation:              Outcome Evaluation: Pt A&Ox3- forgetful to time. VSS except SBP remaining elevated in the 160s- hydralazine added. 1 dose of IV bumex given today. NSR. 1LNC with sat above 88%. Patient plan for heart cath tomorrow NPO at midnight. Patient currently in bed relaxing with call light in reach.

## 2025-07-06 NOTE — PROGRESS NOTES
"  [facility]  PROGRESS NOTE    Date of Admission: 6/30/2025  Date of Service: 07/06/25    Primary Care Physician: Aparna Mendoza MD    Chief Complaint: Aortic stenosis, acute diastolic heart failure due to valvular heart disease  Problem List:   Acute diastolic heart failure    Anemia    CHF (congestive heart failure)    Chronic kidney disease    COPD (chronic obstructive pulmonary disease)    Diabetes mellitus    Dyslipidemia    Hypertension    Acute respiratory failure with hypoxia      Subjective      HPI: Patient is sitting up in the bed breathing easy on room air.  He is eating breakfast.  He denies any chest pain and reports his breathing has improved since admission.      Objective   Vitals: /64 (BP Location: Right arm, Patient Position: Lying)   Pulse 61   Temp 97.7 °F (36.5 °C) (Oral)   Resp 17   Ht 180.3 cm (71\")   Wt 80.5 kg (177 lb 7.5 oz)   SpO2 91%   BMI 24.75 kg/m²     Physical Exam:  GENERAL: Alert, cooperative, in no acute distress.   HEENT: Normocephalic, no jugular venous distention  HEART: Regular rhythm, normal rate, and systolic murmur, gallops, or rubs.   LUNGS: Diminished lung sounds bilaterally  ABDOMEN: Soft, bowel sounds present, nontender   NEUROLOGIC: No focal abnormalities involving strength or sensation are noted.   EXTREMITIES: No clubbing, cyanosis, or edema noted.     Results:  Results from last 7 days   Lab Units 07/06/25  0622 07/05/25  1022 07/03/25  0644   WBC 10*3/mm3 15.02* 14.30* 17.97*   HEMOGLOBIN g/dL 10.2* 10.0* 10.9*   HEMATOCRIT % 33.0* 32.8* 36.1*   PLATELETS 10*3/mm3 314 306 403     Results from last 7 days   Lab Units 07/05/25  1022 07/04/25  0523 07/03/25  0644   SODIUM mmol/L 143 142 140   POTASSIUM mmol/L 4.7 4.9 5.0   CHLORIDE mmol/L 114* 110* 108*   CO2 mmol/L 18.2* 21.0* 21.0*   BUN mg/dL 85.3* 90.9* 82.4*   CREATININE mg/dL 2.13* 2.35* 2.08*   GLUCOSE mg/dL 229* 200* 166*      Lab Results   Component Value " Date    AST 21 07/01/2025    ALT 37 07/01/2025     Results from last 7 days   Lab Units 07/01/25  0100   HEMOGLOBIN A1C % 7.33*         Results from last 7 days   Lab Units 07/01/25  0429   TSH uIU/mL 0.630         Results from last 7 days   Lab Units 07/01/25  0429   PROTIME Seconds 15.5*   INR  1.16*     Results from last 7 days   Lab Units 07/01/25  0429 07/01/25  0100   HSTROP T ng/L 37* 34*     Results from last 7 days   Lab Units 07/03/25  1301   PROBNP pg/mL 2,989.0*         Intake/Output Summary (Last 24 hours) at 7/6/2025 0715  Last data filed at 7/5/2025 1946  Gross per 24 hour   Intake 580 ml   Output 400 ml   Net 180 ml       I personally reviewed the patient's EKG/Telemetry data      EKG 7/1/2025: Normal sinus rhythm    Radiology Data: CT of the chest 7/2/2025: Small to moderate free-flowing right pleural effusion associated with mild discoid atelectasis and trace left pleural effusion.  Interstitial lung changes.  Advanced changes of emphysema.    Current Medications:  arformoterol, 15 mcg, Nebulization, BID - RT  aspirin, 81 mg, Oral, Daily  budesonide, 0.5 mg, Nebulization, BID - RT  carvedilol, 12.5 mg, Oral, BID With Meals  dilTIAZem CD, 360 mg, Oral, Q24H  ferrous sulfate, 325 mg, Oral, Daily With Breakfast  guaiFENesin, 1,200 mg, Oral, Q12H  heparin (porcine), 5,000 Units, Subcutaneous, Q12H  insulin glargine, 10 Units, Subcutaneous, Nightly  insulin lispro, 2-7 Units, Subcutaneous, 4x Daily AC & at Bedtime  Insulin Lispro, 8 Units, Subcutaneous, TID With Meals  methylPREDNISolone sodium succinate, 60 mg, Intravenous, Q24H  pantoprazole, 40 mg, Oral, Q AM  pravastatin, 20 mg, Oral, Nightly  sodium chloride, 10 mL, Intravenous, Q12H  sodium chloride, 4 mL, Nebulization, BID - RT  terazosin, 2 mg, Oral, Nightly  [Held by provider] torsemide, 20 mg, Oral, Daily      Pharmacy To Dose:,         Assessment:   Valvular heart disease/aortic stenosis  Echo 1/2025 moderate aortic stenosis  Echo at outside  facility 6/30/2025 severe aortic stenosis with normal LVEF but mean aortic valve gradient only 22 mmHg  Aspirin 81 mg daily  Borderline for low-flow low gradient aortic stenosis with stroke-volume index of 35 mL/minute/M squared however aortic valve appears to open adequately on echo images  Acute on chronic diastolic heart failure likely due to valvular heart disease  Echo at outside facility states severe aortic stenosis but mean gradient here only 22 mmHg  proBNP elevated 4325 and pleural effusions noted on CT of the chest  Diuretic on hold due to elevated creatinine/CKD  Mildly elevated high-sensitivity troponin/type II mechanism secondary to CHF  Was dense coronary calcification noted on recent CT of the chest  Aspirin 81 mg daily  Interstitial lung disease/pneumonia  VQ scan 7/2/2025 showed asymmetric V/Q findings with much more abnormal diffuse ventilation pattern.  Findings considered low to intermediate probability for pulmonary embolism but are more likely to represent pneumonia or pulmonary edema  CT of the chest shows moderate right pleural effusion and trace left pleural effusion.  Advanced changes of emphysema  Pulmonology consulted and following possible pulmonary fibrosis  Started on steroids and antibiotic  Type 2 diabetes mellitus  A1c 7.33  Chronic kidney disease  Baseline creatinine around 2  On admission creatinine was 2.98  Current creatinine 1.8  Hypertension  Current BP  168/64  Diltiazem 360 mg daily  Carvedilol 12.5 mg twice daily  Terazosin 2 mg nightly  Hyperlipidemia  Pravastatin 20 mg daily    Plan:   Start hydralazine 25 mg every 8 hours for uncontrolled hypertension  N.p.o. after midnight for cardiac cath in a.m. as part of TAVR workup  Avoid nephrotoxic medications      Chel Fabian, APRN

## 2025-07-07 LAB
ANION GAP SERPL CALCULATED.3IONS-SCNC: 10 MMOL/L (ref 5–15)
BUN SERPL-MCNC: 92.3 MG/DL (ref 8–23)
BUN/CREAT SERPL: 44 (ref 7–25)
CALCIUM SPEC-SCNC: 8.6 MG/DL (ref 8.6–10.5)
CHLORIDE SERPL-SCNC: 110 MMOL/L (ref 98–107)
CO2 SERPL-SCNC: 19 MMOL/L (ref 22–29)
CREAT SERPL-MCNC: 2.1 MG/DL (ref 0.76–1.27)
DEPRECATED RDW RBC AUTO: 62 FL (ref 37–54)
EGFRCR SERPLBLD CKD-EPI 2021: 32.4 ML/MIN/1.73
ERYTHROCYTE [DISTWIDTH] IN BLOOD BY AUTOMATED COUNT: 20.3 % (ref 12.3–15.4)
FOLATE SERPL-MCNC: >20 NG/ML (ref 4.78–24.2)
GLUCOSE BLDC GLUCOMTR-MCNC: 204 MG/DL (ref 70–130)
GLUCOSE BLDC GLUCOMTR-MCNC: 237 MG/DL (ref 70–130)
GLUCOSE BLDC GLUCOMTR-MCNC: 302 MG/DL (ref 70–130)
GLUCOSE SERPL-MCNC: 253 MG/DL (ref 65–99)
HCT VFR BLD AUTO: 33 % (ref 37.5–51)
HGB BLD-MCNC: 10.1 G/DL (ref 13–17.7)
MCH RBC QN AUTO: 26 PG (ref 26.6–33)
MCHC RBC AUTO-ENTMCNC: 30.6 G/DL (ref 31.5–35.7)
MCV RBC AUTO: 84.8 FL (ref 79–97)
PLATELET # BLD AUTO: 280 10*3/MM3 (ref 140–450)
PMV BLD AUTO: 9.7 FL (ref 6–12)
POTASSIUM SERPL-SCNC: 4.8 MMOL/L (ref 3.5–5.2)
RBC # BLD AUTO: 3.89 10*6/MM3 (ref 4.14–5.8)
SODIUM SERPL-SCNC: 139 MMOL/L (ref 136–145)
VIT B12 BLD-MCNC: 760 PG/ML (ref 211–946)
WBC NRBC COR # BLD AUTO: 13.59 10*3/MM3 (ref 3.4–10.8)

## 2025-07-07 PROCEDURE — C1769 GUIDE WIRE: HCPCS | Performed by: INTERNAL MEDICINE

## 2025-07-07 PROCEDURE — 80048 BASIC METABOLIC PNL TOTAL CA: CPT | Performed by: PHYSICIAN ASSISTANT

## 2025-07-07 PROCEDURE — 25510000001 IOPAMIDOL PER 1 ML: Performed by: INTERNAL MEDICINE

## 2025-07-07 PROCEDURE — 99232 SBSQ HOSP IP/OBS MODERATE 35: CPT

## 2025-07-07 PROCEDURE — 99232 SBSQ HOSP IP/OBS MODERATE 35: CPT | Performed by: INTERNAL MEDICINE

## 2025-07-07 PROCEDURE — 25010000002 METHYLPREDNISOLONE PER 125 MG: Performed by: PHYSICIAN ASSISTANT

## 2025-07-07 PROCEDURE — 25010000002 FENTANYL CITRATE (PF) 50 MCG/ML SOLUTION: Performed by: INTERNAL MEDICINE

## 2025-07-07 PROCEDURE — 82948 REAGENT STRIP/BLOOD GLUCOSE: CPT

## 2025-07-07 PROCEDURE — 94799 UNLISTED PULMONARY SVC/PX: CPT

## 2025-07-07 PROCEDURE — 85027 COMPLETE CBC AUTOMATED: CPT | Performed by: PHYSICIAN ASSISTANT

## 2025-07-07 PROCEDURE — 94761 N-INVAS EAR/PLS OXIMETRY MLT: CPT

## 2025-07-07 PROCEDURE — 82746 ASSAY OF FOLIC ACID SERUM: CPT | Performed by: PHYSICIAN ASSISTANT

## 2025-07-07 PROCEDURE — 4A023N7 MEASUREMENT OF CARDIAC SAMPLING AND PRESSURE, LEFT HEART, PERCUTANEOUS APPROACH: ICD-10-PCS | Performed by: INTERNAL MEDICINE

## 2025-07-07 PROCEDURE — 94664 DEMO&/EVAL PT USE INHALER: CPT

## 2025-07-07 PROCEDURE — 25010000002 MIDAZOLAM PER 1 MG: Performed by: INTERNAL MEDICINE

## 2025-07-07 PROCEDURE — 63710000001 INSULIN GLARGINE PER 5 UNITS: Performed by: INTERNAL MEDICINE

## 2025-07-07 PROCEDURE — 63710000001 INSULIN LISPRO (HUMAN) PER 5 UNITS: Performed by: INTERNAL MEDICINE

## 2025-07-07 PROCEDURE — C1894 INTRO/SHEATH, NON-LASER: HCPCS | Performed by: INTERNAL MEDICINE

## 2025-07-07 PROCEDURE — 93458 L HRT ARTERY/VENTRICLE ANGIO: CPT | Performed by: INTERNAL MEDICINE

## 2025-07-07 PROCEDURE — B2151ZZ FLUOROSCOPY OF LEFT HEART USING LOW OSMOLAR CONTRAST: ICD-10-PCS | Performed by: INTERNAL MEDICINE

## 2025-07-07 PROCEDURE — 25010000002 HYDRALAZINE PER 20 MG: Performed by: NURSE PRACTITIONER

## 2025-07-07 PROCEDURE — 99232 SBSQ HOSP IP/OBS MODERATE 35: CPT | Performed by: PHYSICIAN ASSISTANT

## 2025-07-07 PROCEDURE — B2111ZZ FLUOROSCOPY OF MULTIPLE CORONARY ARTERIES USING LOW OSMOLAR CONTRAST: ICD-10-PCS | Performed by: INTERNAL MEDICINE

## 2025-07-07 PROCEDURE — 25010000002 HEPARIN (PORCINE) PER 1000 UNITS: Performed by: INTERNAL MEDICINE

## 2025-07-07 PROCEDURE — 82607 VITAMIN B-12: CPT | Performed by: PHYSICIAN ASSISTANT

## 2025-07-07 PROCEDURE — 25010000002 LIDOCAINE PF 1% 1 % SOLUTION: Performed by: INTERNAL MEDICINE

## 2025-07-07 RX ORDER — IOPAMIDOL 755 MG/ML
INJECTION, SOLUTION INTRAVASCULAR
Status: DISCONTINUED | OUTPATIENT
Start: 2025-07-07 | End: 2025-07-07 | Stop reason: HOSPADM

## 2025-07-07 RX ORDER — LIDOCAINE HYDROCHLORIDE 10 MG/ML
INJECTION, SOLUTION EPIDURAL; INFILTRATION; INTRACAUDAL; PERINEURAL
Status: DISCONTINUED | OUTPATIENT
Start: 2025-07-07 | End: 2025-07-07 | Stop reason: HOSPADM

## 2025-07-07 RX ORDER — ALPRAZOLAM 0.25 MG
0.25 TABLET ORAL 3 TIMES DAILY PRN
Status: DISCONTINUED | OUTPATIENT
Start: 2025-07-07 | End: 2025-07-08

## 2025-07-07 RX ORDER — MIDAZOLAM HYDROCHLORIDE 1 MG/ML
INJECTION, SOLUTION INTRAMUSCULAR; INTRAVENOUS
Status: DISCONTINUED | OUTPATIENT
Start: 2025-07-07 | End: 2025-07-07 | Stop reason: HOSPADM

## 2025-07-07 RX ORDER — ACETAMINOPHEN 325 MG/1
650 TABLET ORAL EVERY 4 HOURS PRN
Status: DISCONTINUED | OUTPATIENT
Start: 2025-07-07 | End: 2025-07-09 | Stop reason: HOSPADM

## 2025-07-07 RX ORDER — PREDNISONE 20 MG/1
20 TABLET ORAL
Status: DISCONTINUED | OUTPATIENT
Start: 2025-07-08 | End: 2025-07-09 | Stop reason: HOSPADM

## 2025-07-07 RX ORDER — FENTANYL CITRATE 50 UG/ML
INJECTION, SOLUTION INTRAMUSCULAR; INTRAVENOUS
Status: DISCONTINUED | OUTPATIENT
Start: 2025-07-07 | End: 2025-07-07 | Stop reason: HOSPADM

## 2025-07-07 RX ORDER — SODIUM CHLORIDE 9 MG/ML
250 INJECTION, SOLUTION INTRAVENOUS ONCE AS NEEDED
Status: ACTIVE | OUTPATIENT
Start: 2025-07-07 | End: 2025-07-07

## 2025-07-07 RX ORDER — NALOXONE HCL 0.4 MG/ML
0.4 VIAL (ML) INJECTION
Status: DISCONTINUED | OUTPATIENT
Start: 2025-07-07 | End: 2025-07-08

## 2025-07-07 RX ORDER — NITROGLYCERIN 0.4 MG/1
0.4 TABLET SUBLINGUAL
Status: DISCONTINUED | OUTPATIENT
Start: 2025-07-07 | End: 2025-07-09 | Stop reason: HOSPADM

## 2025-07-07 RX ORDER — SODIUM CHLORIDE 9 MG/ML
50 INJECTION, SOLUTION INTRAVENOUS CONTINUOUS
Status: DISCONTINUED | OUTPATIENT
Start: 2025-07-07 | End: 2025-07-08

## 2025-07-07 RX ORDER — HYDROCODONE BITARTRATE AND ACETAMINOPHEN 7.5; 325 MG/1; MG/1
1 TABLET ORAL EVERY 4 HOURS PRN
Refills: 0 | Status: DISCONTINUED | OUTPATIENT
Start: 2025-07-07 | End: 2025-07-09 | Stop reason: HOSPADM

## 2025-07-07 RX ORDER — WATER 10 ML/10ML
INJECTION INTRAMUSCULAR; INTRAVENOUS; SUBCUTANEOUS
Status: COMPLETED
Start: 2025-07-07 | End: 2025-07-07

## 2025-07-07 RX ORDER — INSULIN LISPRO 100 [IU]/ML
5 INJECTION, SOLUTION INTRAVENOUS; SUBCUTANEOUS
Status: DISCONTINUED | OUTPATIENT
Start: 2025-07-08 | End: 2025-07-08

## 2025-07-07 RX ORDER — HYDRALAZINE HYDROCHLORIDE 20 MG/ML
10 INJECTION INTRAMUSCULAR; INTRAVENOUS ONCE
Status: COMPLETED | OUTPATIENT
Start: 2025-07-07 | End: 2025-07-07

## 2025-07-07 RX ORDER — MORPHINE SULFATE 2 MG/ML
1 INJECTION, SOLUTION INTRAMUSCULAR; INTRAVENOUS EVERY 4 HOURS PRN
Status: DISCONTINUED | OUTPATIENT
Start: 2025-07-07 | End: 2025-07-08

## 2025-07-07 RX ADMIN — PANTOPRAZOLE SODIUM 40 MG: 40 TABLET, DELAYED RELEASE ORAL at 05:52

## 2025-07-07 RX ADMIN — HYDRALAZINE HYDROCHLORIDE 10 MG: 20 INJECTION INTRAMUSCULAR; INTRAVENOUS at 01:23

## 2025-07-07 RX ADMIN — HYDRALAZINE HYDROCHLORIDE 25 MG: 25 TABLET ORAL at 14:25

## 2025-07-07 RX ADMIN — DILTIAZEM HYDROCHLORIDE 360 MG: 180 CAPSULE, EXTENDED RELEASE ORAL at 09:14

## 2025-07-07 RX ADMIN — CARVEDILOL 12.5 MG: 12.5 TABLET, FILM COATED ORAL at 09:14

## 2025-07-07 RX ADMIN — ARFORMOTEROL TARTRATE 15 MCG: 15 SOLUTION RESPIRATORY (INHALATION) at 08:50

## 2025-07-07 RX ADMIN — PRAVASTATIN SODIUM 20 MG: 20 TABLET ORAL at 20:39

## 2025-07-07 RX ADMIN — INSULIN LISPRO 5 UNITS: 100 INJECTION, SOLUTION INTRAVENOUS; SUBCUTANEOUS at 20:40

## 2025-07-07 RX ADMIN — INSULIN GLARGINE 12 UNITS: 100 INJECTION, SOLUTION SUBCUTANEOUS at 20:40

## 2025-07-07 RX ADMIN — Medication 10 ML: at 23:26

## 2025-07-07 RX ADMIN — METHYLPREDNISOLONE SODIUM SUCCINATE 60 MG: 125 INJECTION INTRAMUSCULAR; INTRAVENOUS at 10:38

## 2025-07-07 RX ADMIN — BUDESONIDE 0.5 MG: 0.5 SUSPENSION RESPIRATORY (INHALATION) at 08:50

## 2025-07-07 RX ADMIN — CARVEDILOL 12.5 MG: 12.5 TABLET, FILM COATED ORAL at 20:39

## 2025-07-07 RX ADMIN — ARFORMOTEROL TARTRATE 15 MCG: 15 SOLUTION RESPIRATORY (INHALATION) at 21:25

## 2025-07-07 RX ADMIN — BUDESONIDE 0.5 MG: 0.5 SUSPENSION RESPIRATORY (INHALATION) at 20:58

## 2025-07-07 RX ADMIN — HYDRALAZINE HYDROCHLORIDE 25 MG: 25 TABLET ORAL at 23:26

## 2025-07-07 RX ADMIN — WATER 10 ML: 1 INJECTION INTRAMUSCULAR; INTRAVENOUS; SUBCUTANEOUS at 10:38

## 2025-07-07 RX ADMIN — HEPARIN SODIUM 5000 UNITS: 5000 INJECTION INTRAVENOUS; SUBCUTANEOUS at 20:39

## 2025-07-07 RX ADMIN — Medication 10 ML: at 09:14

## 2025-07-07 RX ADMIN — TERAZOSIN HYDROCHLORIDE 2 MG: 2 CAPSULE ORAL at 20:39

## 2025-07-07 RX ADMIN — HYDRALAZINE HYDROCHLORIDE 25 MG: 25 TABLET ORAL at 05:46

## 2025-07-07 NOTE — PROGRESS NOTES
Nutrition Services    Patient Name:  Baldemar Anderson  YOB: 1950  MRN: [id number]  Admit Date:  6/30/2025    Patient screened for LOS. Chart reviewed. Patient endorsed PO intake. Denied any recent changes in appetite. Endorsed meal orders are being taken. Stated he is impressed with the food served here. Reported weight loss 2/2 fluid status Denied any chewing or swallowing difficulties. Reported food allergy to tuna - will place in EMR. Currently does not meet malnutrition criteria. No nutrition monitoring warranted at this time. RDN following peripherally. Available via consult.     Electronically signed by:  Anjelica Sylvester MS,RD,LD  07/07/25 12:11 EDT

## 2025-07-07 NOTE — PROGRESS NOTES
"[facility] Cardiology Daily Note       LOS: 6 days   Patient Care Team:  Aparna Mendoza MD as PCP - General (Family Medicine)    Chief Complaint: Aortic stenosis/pulmonary edema    Subjective     Subjective: Breathing is better.  94% on 1 L nasal cannula.  Blood pressures have been elevated overnight.  Heart rates in the 60s and 70s.      Review of Systems:   As above.    Medications:  arformoterol, 15 mcg, Nebulization, BID - RT  aspirin, 81 mg, Oral, Daily  budesonide, 0.5 mg, Nebulization, BID - RT  carvedilol, 12.5 mg, Oral, BID With Meals  dilTIAZem CD, 360 mg, Oral, Q24H  ferrous sulfate, 325 mg, Oral, Daily With Breakfast  heparin (porcine), 5,000 Units, Subcutaneous, Q12H  hydrALAZINE, 25 mg, Oral, Q8H  insulin glargine, 12 Units, Subcutaneous, Nightly  Insulin Lispro, 12 Units, Subcutaneous, TID With Meals  insulin lispro, 2-7 Units, Subcutaneous, 4x Daily AC & at Bedtime  methylPREDNISolone sodium succinate, 60 mg, Intravenous, Q24H  pantoprazole, 40 mg, Oral, Q AM  pravastatin, 20 mg, Oral, Nightly  sodium chloride, 10 mL, Intravenous, Q12H  terazosin, 2 mg, Oral, Nightly  [Held by provider] torsemide, 20 mg, Oral, Daily        Objective     Vital Sign Min/Max for last 24 hours  Temp  Min: 97.6 °F (36.4 °C)  Max: 98.1 °F (36.7 °C)   BP  Min: 154/68  Max: 179/74   Pulse  Min: 53  Max: 75   Resp  Min: 18  Max: 18   SpO2  Min: 86 %  Max: 100 %   Flow (L/min) (Oxygen Therapy)  Min: 1  Max: 1   Weight  Min: 81.5 kg (179 lb 10.8 oz)  Max: 81.5 kg (179 lb 10.8 oz)      Intake/Output Summary (Last 24 hours) at 7/7/2025 1001  Last data filed at 7/7/2025 0423  Gross per 24 hour   Intake 480 ml   Output 2450 ml   Net -1970 ml        Flowsheet Rows      Flowsheet Row First Filed Value   Admission Height 180.3 cm (71\") Documented at 06/30/2025 2119   Admission Weight 84.3 kg (185 lb 13.6 oz) Documented at 06/30/2025 2119            Physical Exam:    General: Alert and oriented.   Cardiovascular: " "Heart has a nondisplaced focal PMI. Regular rate and rhythm with 2/6 SE murmur, no gallop or rub.  Lungs: No rales or wheezes are heard.  Equal expansion is noted.   Abdomen: Soft, nontender.  Extremities: Show no edema.   Skin: warm and dry.     Results Review:    I reviewed the patient's new clinical results.  EKG:  Tele: Sinus rhythm    Labs:    Results from last 7 days   Lab Units 07/07/25  0616 07/06/25  0622 07/05/25  1022 07/02/25  0542 07/01/25  0100   SODIUM mmol/L 139 142 143   < > 138   POTASSIUM mmol/L 4.8 4.8 4.7   < > 4.6   CHLORIDE mmol/L 110* 113* 114*   < > 105   CO2 mmol/L 19.0* 20.0* 18.2*   < > 18.5*   BUN mg/dL 92.3* 88.4* 85.3*   < > 75.5*   CREATININE mg/dL 2.10* 1.80* 2.13*   < > 2.89*   CALCIUM mg/dL 8.6 8.9 8.7   < > 8.6   BILIRUBIN mg/dL  --   --   --   --  <0.2   ALK PHOS U/L  --   --   --   --  70   ALT (SGPT) U/L  --   --   --   --  37   AST (SGOT) U/L  --   --   --   --  21   GLUCOSE mg/dL 253* 164* 229*   < > 320*    < > = values in this interval not displayed.     Results from last 7 days   Lab Units 07/07/25  0616 07/06/25  0622 07/05/25  1022   WBC 10*3/mm3 13.59* 15.02* 14.30*   HEMOGLOBIN g/dL 10.1* 10.2* 10.0*   HEMATOCRIT % 33.0* 33.0* 32.8*   PLATELETS 10*3/mm3 280 314 306     Lab Results   Component Value Date    TROPONINT 37 (H) 07/01/2025    TROPONINT 34 (H) 07/01/2025     No results found for: \"CHOL\"  No results found for: \"TRIG\"  No results found for: \"HDL\"  No components found for: \"LDLCALC\"  Lab Results   Component Value Date    INR 1.16 (H) 07/01/2025    INR 1.06 06/27/2025    INR 1.04 06/21/2025    PROTIME 15.5 (H) 07/01/2025    PROTIME 10.8 06/27/2025    PROTIME 10.9 06/21/2025         Ejection Fraction:    Assessment   Assessment:    Valvular heart disease   Echo 1/2025: Moderate aortic stenosis  Echo 6/30/2025 (outside facility): Report states severe aortic valve stenosis, normal EF but the mean aortic valve gradient is only 22 mmHg.  HFpEF  Echo 6/30/2025 (outside " facility): Report states severe aortic valve stenosis, normal EF however the mean aortic valve gradient is only 22 mmHg.  Hypoxemia/possible interstitial lung disease/pneumonia  Hypertension  Hyperlipidemia  Diabetes mellitus  Chronic kidney disease  COPD.  Possible interstitial lung disease versus fluid  Chronic anemia    Plan:    He is borderline for low-flow low gradient aortic stenosis with a stroke-volume index of 35 mL/min/m2  however the aortic valve appears to open adequately on echo images.    He has multiple risk factors for coronary artery disease and needs to undergo cardiac catheterization prior to any additional workup for his aortic stenosis.  If no significant findings are present at the time of cardiac catheterization then further pulmonary workup should be performed for likely pulmonary fibrosis prior to considering him for AVR.  Hypoxemia may have been the driving force for his pulmonary edema.    Continue carvedilol diltiazem and terazosin at the current dose.    Left heart catheterization today.  Minimal contrast use due to low GFR.  Ulnar pulses are poor bilaterally.  Will be right femoral access.  The patient understands the risks and benefits and agrees to proceed.    Roseann Carrillo MD  07/07/25  10:01 EDT

## 2025-07-07 NOTE — CASE MANAGEMENT/SOCIAL WORK
Continued Stay Note   [facility]     Patient Name: Baldemar Anderson  MRN: [id number]  Today's Date: 7/7/2025    Admit Date: 6/30/2025    Plan: update   Discharge Plan       Row Name 07/07/25 1049       Plan    Plan update    Patient/Family in Agreement with Plan yes    Plan Comments Per MDR, scheduled for HC today.   Spoke with patient and family at bedside regarding discharge plan.  Patient currently on 1LO2NC but is [id number] at baseline.  Patient denies home needs at this time.  CM following.  Patient plan is to discharge home via car with family to transport.    Final Discharge Disposition Code 01 - home or self-care                   Discharge Codes    No documentation.                 Expected Discharge Date and Time       Expected Discharge Date Expected Discharge Time    Jul 9, 2025               Vivienne Wall RN

## 2025-07-07 NOTE — PROGRESS NOTES
Pulmonary/Critical Care Follow-up     LOS: 6 days   Patient Care Team:  Aparna Mendoza MD as PCP - General (Family Medicine)    Chief Complaint: Hypoxemic respiratory failure      Subjective     History reviewed and updated in EMR as indicated.    Interval History:     Patient is overall feeling much better.  He reports lower extremity edema has improved.  No fevers or chills.  He was on 1 L oxygen I was able to decrease him to room air with saturations of around 90%.  Patient had left heart catheterization today.  There was mild CAD and 70% proximal right iliac stenosis.    History taken from: Patient, chart, staff    PMH/FH/Social History were reviewed and updated appropriately in the electronic medical record.     Review of Systems:    Review of 14 systems was completed with positives and pertinent negatives noted in the subjective section.  All other systems reviewed and are negative.         Objective     Vital Signs  Temp:  [97.5 °F (36.4 °C)-98.1 °F (36.7 °C)] 97.5 °F (36.4 °C)  Heart Rate:  [59-75] 67  Resp:  [16-18] 16  BP: (134-193)/(51-83) 145/53  07/06 0701 - 07/07 0700  In: 960 [P.O.:960]  Out: 2450 [Urine:2450]  Body mass index is 25.06 kg/m².     IV drips:  niCARdipine  Pharmacy To Dose:  sodium chloride       Physical Exam:     Constitutional:   Alert, in no acute distress   Head:   Normocephalic, atraumatic   Eyes:           Lids and lashes normal, conjunctivae and sclerae normal.  PER   ENMT:  Ears appear intact with no abnormalities noted     Lips normal.     Neck:  Trachea midline, no JVD   Lungs/Resp:    Normal effort, symmetric chest rise, no crepitus, mild bibasilar rales.               Heart/CV:   Regular rhythm and normal rate, grade 3 out of 6 crescendo decrescendo murmur right upper sternal border.   Abdomen/GI:    Soft, nontender, nondistended   :    Deferred   Extremities/MSK:  No clubbing or cyanosis.  Trace lower extremity edema.     Pulses:  Pulses palpable and equal bilaterally    Skin:  No bleeding, bruising or rash   Heme/Lymph:  No cervical or supraclavicular adenopathy.   Neurologic:    Psychiatric:    Moves all extremities with no obvious focal motor deficit.  Cranial nerves 2 - 12 grossly intact  Non-agitated, normal affect.    The above physical exam findings were reviewed and reflect my exam findings as of today's exam.   Electronically signed by:  Yaya Fulton MD  07/07/25  19:01 EDT      Results Review:     I reviewed the patient's new clinical results.   Results from last 7 days   Lab Units 07/07/25  0616 07/06/25  0622 07/05/25  1022 07/02/25  0542 07/01/25  0100   SODIUM mmol/L 139 142 143   < > 138   POTASSIUM mmol/L 4.8 4.8 4.7   < > 4.6   CHLORIDE mmol/L 110* 113* 114*   < > 105   CO2 mmol/L 19.0* 20.0* 18.2*   < > 18.5*   BUN mg/dL 92.3* 88.4* 85.3*   < > 75.5*   CREATININE mg/dL 2.10* 1.80* 2.13*   < > 2.89*   CALCIUM mg/dL 8.6 8.9 8.7   < > 8.6   BILIRUBIN mg/dL  --   --   --   --  <0.2   ALK PHOS U/L  --   --   --   --  70   ALT (SGPT) U/L  --   --   --   --  37   AST (SGOT) U/L  --   --   --   --  21   GLUCOSE mg/dL 253* 164* 229*   < > 320*    < > = values in this interval not displayed.     Results from last 7 days   Lab Units 07/07/25  0616 07/06/25  0622 07/05/25  1022   WBC 10*3/mm3 13.59* 15.02* 14.30*   HEMOGLOBIN g/dL 10.1* 10.2* 10.0*   HEMATOCRIT % 33.0* 33.0* 32.8*   PLATELETS 10*3/mm3 280 314 306         Results from last 7 days   Lab Units 07/05/25  1022 07/01/25  0429 07/01/25  0100   MAGNESIUM mg/dL 2.0 2.3 2.2       I reviewed the patient's new imaging including images and reports.    HRCT chest 7/2/2025 reviewed and shows significant emphysema with pulmonary edema.    Medication Review:   arformoterol, 15 mcg, Nebulization, BID - RT  aspirin, 81 mg, Oral, Daily  budesonide, 0.5 mg, Nebulization, BID - RT  carvedilol, 12.5 mg, Oral, BID With Meals  dilTIAZem CD, 360 mg, Oral, Q24H  ferrous sulfate, 325 mg, Oral, Daily With Breakfast  heparin  (porcine), 5,000 Units, Subcutaneous, Q12H  hydrALAZINE, 25 mg, Oral, Q8H  insulin glargine, 12 Units, Subcutaneous, Nightly  Insulin Lispro, 12 Units, Subcutaneous, TID With Meals  insulin lispro, 2-7 Units, Subcutaneous, 4x Daily AC & at Bedtime  methylPREDNISolone sodium succinate, 60 mg, Intravenous, Q24H  pantoprazole, 40 mg, Oral, Q AM  pravastatin, 20 mg, Oral, Nightly  sodium chloride, 10 mL, Intravenous, Q12H  terazosin, 2 mg, Oral, Nightly  [Held by provider] torsemide, 20 mg, Oral, Daily      niCARdipine, 5-15 mg/hr  Pharmacy To Dose:,   sodium chloride, 50 mL/hr        Assessment & Plan         Acute diastolic heart failure    Anemia    CHF (congestive heart failure)    Chronic kidney disease    COPD (chronic obstructive pulmonary disease)    Diabetes mellitus    Dyslipidemia    Hypertension    Acute respiratory failure with hypoxia    75 y.o. male with history of HTN, HLD, CHF, T2DM, anemia, COPD, tobacco abuse, recently found to have severe aortic stenosis and transferred for TAVR evaluation requiring high flow nasal cannula oxygen.  Patient has been diuresed with significant improvement in oxygen saturation, currently tolerating room air with oxygen saturations of 90%.    Initial concern for interstitial lung disease based on imaging, which shows emphysema with an overlying pattern of pulmonary edema.  Given his significant clinical improvement with diuresis alone, the likelihood that his findings are primarily related to interstitial lung disease is extremely low.  A serologic evaluation was sent off and is currently pending.    Plan:  1.  For acute hypoxemic respiratory failure: This is due to pulmonary edema from valvular heart disease.  Recommend focusing management on addressing this issue.  He has gone from high flow nasal cannula to room air with diuresis, quite rapidly.  This is not consistent with hypoxia from interstitial lung disease, but is consistent with hypoxemia secondary to pulmonary  edema.  I will discontinue Solu-Medrol and changed to prednisone 20 mg daily for 7 days and then stop.  Recommend following up with Dr. Tong as an outpatient in 4 to 6 weeks to review labs and consider 6-minute walk testing at that time.  2.  For aortic stenosis: Patient undergoing TAVR workup.  3.  For acute hypoxemic respiratory failure: Essentially resolved, tolerating room air currently with oxygen saturations of 90%.  Recommend ongoing management of valvular heart disease.  4.  For COPD: Continue bronchodilator/inhaled corticosteroids.  I would recommend continuing home Symbicort.  Recommend PFTs as outpatient at follow-up.    Electronically signed by:    Yaya Fulton MD  07/07/25  19:01 EDT      *. Please note that portions of this note were completed with [employer] - a voice recognition program.

## 2025-07-07 NOTE — PLAN OF CARE
Goal Outcome Evaluation:      Patient A&Ox4 this shift, remained on 1LNC and was compliant with wearing it. Patient ambulated in the room to the BSC and had a bowel movement. NPO at midnight for heart cath planned 7/7. Call light within reach, bed alarm set, patient resting comfortably at this time.

## 2025-07-07 NOTE — PROGRESS NOTES
[facility] Cardiothoracic Surgery In-Patient Progress Note     LOS: 6 days     Chief Complaint: Aortic stenosis    Subjective  Denies chest pain or dyspnea. VSS on 1L saturations 95%.     Objective  Vital Signs  Temp:  [97.6 °F (36.4 °C)-98.1 °F (36.7 °C)] 98.1 °F (36.7 °C)  Heart Rate:  [53-71] 68  Resp:  [18] 18  BP: (154-179)/(56-87) 166/65    Physical Exam:   General Appearance: alert, appears stated age and cooperative   Lungs: Diminished bases bilaterally   Heart: Grade II systolic murmur noted   Ext: No edema  Results     Results from last 7 days   Lab Units 07/07/25  0616   WBC 10*3/mm3 13.59*   HEMOGLOBIN g/dL 10.1*   HEMATOCRIT % 33.0*   PLATELETS 10*3/mm3 280     Results from last 7 days   Lab Units 07/06/25  0622   SODIUM mmol/L 142   POTASSIUM mmol/L 4.8   CHLORIDE mmol/L 113*   CO2 mmol/L 20.0*   BUN mg/dL 88.4*   CREATININE mg/dL 1.80*   GLUCOSE mg/dL 164*   CALCIUM mg/dL 8.9     Carotid duplex    Right internal carotid artery demonstrates a less than 50% stenosis.    Antegrade right vertebral flow.    Left internal carotid artery demonstrates a less than 50% stenosis.    Antegrade left vertebral flow.    TTE: 7/1/2025    Left ventricular systolic function is normal. Calculated left ventricular EF = 61.5%    Left ventricular wall thickness is consistent with hypertrophy.    Left ventricular diastolic function was normal.    The left atrial cavity is dilated.    Left atrial volume is mildly increased.    Moderate aortic valve stenosis is present.    Aortic valve maximum pressure gradient is 38 mmHg. Aortic valve mean pressure gradient is 20 mmHg.    Systolic anterior motion of the anterior mitral leaflet is present.    Assessment    Acute diastolic heart failure    Anemia    CHF (congestive heart failure)    Chronic kidney disease    COPD (chronic obstructive pulmonary disease)    Diabetes mellitus    Dyslipidemia    Hypertension    Acute respiratory failure with hypoxia      Plan   Scheduled for  cardiac cath today - will await results  Continue medical optimization      Taylor Hutchison, HARRISON  07/07/25  07:12 EDT

## 2025-07-07 NOTE — PLAN OF CARE
Goal Outcome Evaluation:              Outcome Evaluation: Pt is a&ox4. VSS. Weaned to RA with sats greater than 88%. Had C today. Bilateral groin sites are clean, dry, intact and soft. Bedrest until 1930. No complaints of pain. Bed in lowest position, alarm set, and call light within reach. Care ongoing.

## 2025-07-07 NOTE — PROGRESS NOTES
[facility] Medicine Services  PROGRESS NOTE    Patient Name: Baldemar Anderson  : 1950  MRN: [id number]    Date of Admission: 2025  Primary Care Physician: Aparna Mendoza MD    Subjective     CC: f/u hypoxia     HPI:  In chair, just got back from a walk. Wife and daughter at bedside. Stable on 1L NC. [facility] planned for today    Objective     Vital Signs:   Temp:  [97.5 °F (36.4 °C)-98.1 °F (36.7 °C)] 97.5 °F (36.4 °C)  Heart Rate:  [53-75] 63  Resp:  [18] 18  BP: (159-179)/(56-83) 175/68  Flow (L/min) (Oxygen Therapy):  [1] 1     Physical Exam:  Constitutional: No acute distress, awake, alert and conversant. Sitting upright in bed with family at bedside.   HENT: NCAT, mucous membranes moist  Respiratory: R basilar rales, no wheezes or rhonchi, normal respiratory effort with sat 93% on 1L NC  Cardiovascular: RRR  Gastrointestinal: Positive bowel sounds, soft, nontender, nondistended  Musculoskeletal: No bilateral ankle edema  Psychiatric: Appropriate affect, cooperative with exam  Neurologic: Oriented x 3, moves all extremities spontaneously without focal deficits, speech clear    Results Reviewed:  LAB RESULTS:      Lab 25  0616 25  0622 25  1022 25  0644 25  1224 25  1148 25  0542 25  0429 25  0100   WBC 13.59* 15.02* 14.30* 17.97*  --   --  19.63*  --  21.10*   HEMOGLOBIN 10.1* 10.2* 10.0* 10.9*  --   --  11.1*  --  9.8*   HEMATOCRIT 33.0* 33.0* 32.8* 36.1*  --   --  36.8*  --  32.7*   PLATELETS 280 314 306 403  --   --  409  --  406   NEUTROS ABS  --  13.76* 12.83* 15.89*  --   --  16.12*  --  19.57*   IMMATURE GRANS (ABS)  --  0.28* 0.19* 0.27*  --   --  0.37*  --  0.34*   LYMPHS ABS  --  0.58* 0.63* 0.93  --   --  1.67  --  0.61*   MONOS ABS  --  0.38 0.64 0.85  --   --  1.38*  --  0.54   EOS ABS  --  0.00 0.00 0.00  --   --  0.04  --  0.00   MCV 84.8 85.5 87.7 84.5  --   --  86.2  --  85.6   SED RATE  --   --   --   --   >130*  --   --   --   --    CRP  --   --   --   --   --  1.50*  --   --   --    PROTIME  --   --   --   --   --   --   --  15.5*  --    HSTROP T  --   --   --   --   --   --   --  37* 34*         Lab 07/07/25  0616 07/06/25  0622 07/05/25  1022 07/04/25  0523 07/03/25  0644 07/02/25  0542 07/01/25  0429 07/01/25  0100   SODIUM 139 142 143 142 140   < >  --  138   POTASSIUM 4.8 4.8 4.7 4.9 5.0   < >  --  4.6   CHLORIDE 110* 113* 114* 110* 108*   < >  --  105   CO2 19.0* 20.0* 18.2* 21.0* 21.0*   < >  --  18.5*   ANION GAP 10.0 9.0 10.8 11.0 11.0   < >  --  14.5   BUN 92.3* 88.4* 85.3* 90.9* 82.4*   < >  --  75.5*   CREATININE 2.10* 1.80* 2.13* 2.35* 2.08*   < >  --  2.89*   EGFR 32.4* 39.0* 31.9* 28.3* 32.8*   < >  --  22.1*   GLUCOSE 253* 164* 229* 200* 166*   < >  --  320*   CALCIUM 8.6 8.9 8.7 8.7 8.9   < >  --  8.6   MAGNESIUM  --   --  2.0  --   --   --  2.3 2.2   HEMOGLOBIN A1C  --   --   --   --   --   --   --  7.33*   TSH  --   --   --   --   --   --  0.630  --     < > = values in this interval not displayed.         Lab 07/01/25  0100   TOTAL PROTEIN 6.7   ALBUMIN 2.8*   GLOBULIN 3.9   ALT (SGPT) 37   AST (SGOT) 21   BILIRUBIN <0.2   ALK PHOS 70         Lab 07/06/25  0622 07/03/25  1301 07/01/25  0429 07/01/25  0100   PROBNP 1,029.0* 2,989.0* 4,325.0*  --    HSTROP T  --   --  37* 34*   PROTIME  --   --  15.5*  --    INR  --   --  1.16*  --      Brief Urine Lab Results       None          Microbiology Results Abnormal       None          No radiology results from the last 24 hrs    Results for orders placed during the hospital encounter of 06/30/25    Adult Transthoracic Echo Complete W/ Cont if Necessary Per Protocol 07/01/2025  3:03 PM    Interpretation Summary    Left ventricular systolic function is normal. Calculated left ventricular EF = 61.5%    Left ventricular wall thickness is consistent with hypertrophy.    Left ventricular diastolic function was normal.    The left atrial cavity is dilated.     Left atrial volume is mildly increased.    Moderate aortic valve stenosis is present.    Aortic valve maximum pressure gradient is 38 mmHg. Aortic valve mean pressure gradient is 20 mmHg.    Systolic anterior motion of the anterior mitral leaflet is present.    Current medications:  Scheduled Meds:arformoterol, 15 mcg, Nebulization, BID - RT  aspirin, 81 mg, Oral, Daily  budesonide, 0.5 mg, Nebulization, BID - RT  carvedilol, 12.5 mg, Oral, BID With Meals  dilTIAZem CD, 360 mg, Oral, Q24H  ferrous sulfate, 325 mg, Oral, Daily With Breakfast  heparin (porcine), 5,000 Units, Subcutaneous, Q12H  hydrALAZINE, 25 mg, Oral, Q8H  insulin glargine, 12 Units, Subcutaneous, Nightly  Insulin Lispro, 12 Units, Subcutaneous, TID With Meals  insulin lispro, 2-7 Units, Subcutaneous, 4x Daily AC & at Bedtime  methylPREDNISolone sodium succinate, 60 mg, Intravenous, Q24H  pantoprazole, 40 mg, Oral, Q AM  pravastatin, 20 mg, Oral, Nightly  sodium chloride, 10 mL, Intravenous, Q12H  terazosin, 2 mg, Oral, Nightly  [Held by provider] torsemide, 20 mg, Oral, Daily      Continuous Infusions:Pharmacy To Dose:,       PRN Meds:.  acetaminophen **OR** acetaminophen **OR** acetaminophen    albuterol    albuterol    senna-docusate sodium **AND** polyethylene glycol **AND** bisacodyl **AND** bisacodyl    dextrose    dextrose    glucagon (human recombinant)    nitroglycerin    Pharmacy To Dose:    sodium chloride    sodium chloride    temazepam    Assessment & Plan   Assessment & Plan     Active Hospital Problems    Diagnosis  POA    **Acute diastolic heart failure [I50.31]  Yes    Acute respiratory failure with hypoxia [J96.01]  Yes    Anemia [D64.9]  Yes    CHF (congestive heart failure) [I50.9]  Yes    Chronic kidney disease [N18.9]  Yes    COPD (chronic obstructive pulmonary disease) [J44.9]  Yes    Diabetes mellitus [E11.9]  Yes    Dyslipidemia [E78.5]  Yes    Hypertension [I10]  Yes      Resolved Hospital Problems   No resolved problems to  display.     Brief Hospital Course to date:  Baldemar Anderson is a 75 y.o. male with PMH significant for HTN, HLD, chronic HFpEF, AS, PAF (not anticoagulated 2/2 history of GIB), chronic respiratory failure 2/2 COPD with prior tobacco abuse (baseline 3-4L NC), DMII, CKD III (baseline creatinine 1.8-2.1) and chronic anemia. He was admitted to [facility] on 6/29/25 for acute respiratory failure with hypoxia felt to be secondary to a/c HFpEF. Pulmonology evaluated and recommended transfer to tertiary center for TAVR evaluation given AS on ECHO. He was transferred to [facility] for higher level of care. Cardiology and pulmonology following      Acute on chronic respiratory failure with hypoxia  Acute on chronic diastolic heart failure  Hypertension  Moderate aortic stenosis  - Per review of records, baseline O2 requirement is 3-4L NC  - Required HFNC - had weaned to 4L NC but has since weaned further to 1L NC   - Cardiology, CT surgery and pulmonology following - considering TAVR work up   - VQ scan reassuring   - 7/1/25 ECHO showed EF 61%, LV hypertrophy with normal diastolic function, moderate aortic valve stenosis.   - Cardiology has reviewed ECHO - per cards, the aortic valve appears to open adequately   - s/p intermittent diuresis (was diuresed at [facility] as well)  - Pulmonology following - on IV Solumedrol, hypertonic saline nebs and Mucinex  - [facility] planned for 7/7     COPD  Possible bacterial pneumonia  - Continue DuoNebs, Pulmicort and Brovana nebs   - Continue Mucinex  - On IV Solumedrol 60mg daily per pulmonology  - s/p IV Cefepime (completed 7/5)  - Blood culture x 2 and sputum culture NGTD  - MRSA PCR / S. Phemo and Legionella urine Ags not collected  - Fibrosis labs send and are pending    GERARD on CKD IIIb, resolved  - Per review of records, baseline creatinine 1.8-2.1  - Creatinine 2.89 on arrival to [facility]   - Home Lisinopril and Torsemide on hold  - Ongoing intermittent diuresis  - Watch renal  function with impending LHC on 7/7  - Creatinine stable/at baseline     Uncontrolled HTN  - Cardiology adjusting meds  - Continue Carvedilol, Diltiazem, Hydralazine and Terazosin  - Torsemide on hold  - Watch BP when steroids tapered      Hyponatremia, resolved   - Sodium is 127 at OSH - s/p Tolvaptan  - Na 138 on arrival and has remained stable      Type 2 diabetes.  - A1c 7.3% - home Glimepiride on hold  - With expected steroid-induced hyperglycemia  - Lantus 12 units QHS, Lispro 12 units TID AC + SSI  - Adjust insulin when steroids tapered     Chronic anemia.  - Hgb stable around ~10   - No overt bleeding noted   - On PO iron   - Check iron studies      Expected Discharge Location and Transportation: home vs SNF   Expected Discharge Expected Discharge Date: 7/9/2025; Expected Discharge Time:       VTE Prophylaxis:Pharmacologic VTE prophylaxis orders are present.    AM-PAC 6 Clicks Score (PT): 19 (07/07/25 0800)    CODE STATUS:   Code Status and Medical Interventions: CPR (Attempt to Resuscitate); Full Support   Ordered at: 07/01/25 0243     Code Status (Patient has no pulse and is not breathing):    CPR (Attempt to Resuscitate)     Medical Interventions (Patient has pulse or is breathing):    Full Support     Level Of Support Discussed With:    Patient     Heather Astorga PA-C  07/07/25

## 2025-07-08 LAB
ANION GAP SERPL CALCULATED.3IONS-SCNC: 12 MMOL/L (ref 5–15)
BUN SERPL-MCNC: 99.2 MG/DL (ref 8–23)
BUN/CREAT SERPL: 39.8 (ref 7–25)
CALCIUM SPEC-SCNC: 8.4 MG/DL (ref 8.6–10.5)
CHLORIDE SERPL-SCNC: 109 MMOL/L (ref 98–107)
CO2 SERPL-SCNC: 16 MMOL/L (ref 22–29)
CREAT SERPL-MCNC: 2.49 MG/DL (ref 0.76–1.27)
DEPRECATED RDW RBC AUTO: 68.2 FL (ref 37–54)
EGFRCR SERPLBLD CKD-EPI 2021: 26.4 ML/MIN/1.73
ERYTHROCYTE [DISTWIDTH] IN BLOOD BY AUTOMATED COUNT: 20.8 % (ref 12.3–15.4)
GLUCOSE BLDC GLUCOMTR-MCNC: 196 MG/DL (ref 70–130)
GLUCOSE BLDC GLUCOMTR-MCNC: 310 MG/DL (ref 70–130)
GLUCOSE BLDC GLUCOMTR-MCNC: 327 MG/DL (ref 70–130)
GLUCOSE BLDC GLUCOMTR-MCNC: 334 MG/DL (ref 70–130)
GLUCOSE SERPL-MCNC: 297 MG/DL (ref 65–99)
HCT VFR BLD AUTO: 32.1 % (ref 37.5–51)
HGB BLD-MCNC: 9.4 G/DL (ref 13–17.7)
MCH RBC QN AUTO: 26.3 PG (ref 26.6–33)
MCHC RBC AUTO-ENTMCNC: 29.3 G/DL (ref 31.5–35.7)
MCV RBC AUTO: 89.9 FL (ref 79–97)
PLATELET # BLD AUTO: 238 10*3/MM3 (ref 140–450)
PMV BLD AUTO: 9.8 FL (ref 6–12)
POTASSIUM SERPL-SCNC: 5.2 MMOL/L (ref 3.5–5.2)
RBC # BLD AUTO: 3.57 10*6/MM3 (ref 4.14–5.8)
SODIUM SERPL-SCNC: 137 MMOL/L (ref 136–145)
WBC NRBC COR # BLD AUTO: 13.71 10*3/MM3 (ref 3.4–10.8)

## 2025-07-08 PROCEDURE — 94799 UNLISTED PULMONARY SVC/PX: CPT

## 2025-07-08 PROCEDURE — 99232 SBSQ HOSP IP/OBS MODERATE 35: CPT | Performed by: THORACIC SURGERY (CARDIOTHORACIC VASCULAR SURGERY)

## 2025-07-08 PROCEDURE — 85027 COMPLETE CBC AUTOMATED: CPT | Performed by: INTERNAL MEDICINE

## 2025-07-08 PROCEDURE — 99232 SBSQ HOSP IP/OBS MODERATE 35: CPT | Performed by: PHYSICIAN ASSISTANT

## 2025-07-08 PROCEDURE — 25810000003 SODIUM CHLORIDE 0.9 % SOLUTION: Performed by: PHYSICIAN ASSISTANT

## 2025-07-08 PROCEDURE — 99232 SBSQ HOSP IP/OBS MODERATE 35: CPT | Performed by: INTERNAL MEDICINE

## 2025-07-08 PROCEDURE — 80048 BASIC METABOLIC PNL TOTAL CA: CPT | Performed by: INTERNAL MEDICINE

## 2025-07-08 PROCEDURE — 63710000001 INSULIN GLARGINE PER 5 UNITS: Performed by: INTERNAL MEDICINE

## 2025-07-08 PROCEDURE — 63710000001 INSULIN LISPRO (HUMAN) PER 5 UNITS: Performed by: PHYSICIAN ASSISTANT

## 2025-07-08 PROCEDURE — 63710000001 INSULIN LISPRO (HUMAN) PER 5 UNITS: Performed by: INTERNAL MEDICINE

## 2025-07-08 PROCEDURE — 25010000002 HEPARIN (PORCINE) PER 1000 UNITS: Performed by: INTERNAL MEDICINE

## 2025-07-08 PROCEDURE — 94664 DEMO&/EVAL PT USE INHALER: CPT

## 2025-07-08 PROCEDURE — 63710000001 PREDNISONE PER 1 MG: Performed by: INTERNAL MEDICINE

## 2025-07-08 PROCEDURE — 82948 REAGENT STRIP/BLOOD GLUCOSE: CPT

## 2025-07-08 PROCEDURE — 94761 N-INVAS EAR/PLS OXIMETRY MLT: CPT

## 2025-07-08 PROCEDURE — 97116 GAIT TRAINING THERAPY: CPT

## 2025-07-08 RX ORDER — INSULIN LISPRO 100 [IU]/ML
6 INJECTION, SOLUTION INTRAVENOUS; SUBCUTANEOUS
Status: DISCONTINUED | OUTPATIENT
Start: 2025-07-08 | End: 2025-07-08

## 2025-07-08 RX ORDER — INSULIN LISPRO 100 [IU]/ML
8 INJECTION, SOLUTION INTRAVENOUS; SUBCUTANEOUS
Status: DISCONTINUED | OUTPATIENT
Start: 2025-07-08 | End: 2025-07-09 | Stop reason: HOSPADM

## 2025-07-08 RX ORDER — SODIUM CHLORIDE 9 MG/ML
50 INJECTION, SOLUTION INTRAVENOUS CONTINUOUS
Status: ACTIVE | OUTPATIENT
Start: 2025-07-08 | End: 2025-07-08

## 2025-07-08 RX ADMIN — DILTIAZEM HYDROCHLORIDE 360 MG: 180 CAPSULE, EXTENDED RELEASE ORAL at 08:18

## 2025-07-08 RX ADMIN — HEPARIN SODIUM 5000 UNITS: 5000 INJECTION INTRAVENOUS; SUBCUTANEOUS at 21:25

## 2025-07-08 RX ADMIN — ARFORMOTEROL TARTRATE 15 MCG: 15 SOLUTION RESPIRATORY (INHALATION) at 08:18

## 2025-07-08 RX ADMIN — BUDESONIDE 0.5 MG: 0.5 SUSPENSION RESPIRATORY (INHALATION) at 19:40

## 2025-07-08 RX ADMIN — Medication 10 ML: at 21:26

## 2025-07-08 RX ADMIN — PRAVASTATIN SODIUM 20 MG: 20 TABLET ORAL at 21:24

## 2025-07-08 RX ADMIN — INSULIN GLARGINE 12 UNITS: 100 INJECTION, SOLUTION SUBCUTANEOUS at 21:25

## 2025-07-08 RX ADMIN — Medication 10 ML: at 08:21

## 2025-07-08 RX ADMIN — HYDRALAZINE HYDROCHLORIDE 25 MG: 25 TABLET ORAL at 21:24

## 2025-07-08 RX ADMIN — SODIUM CHLORIDE 50 ML/HR: 9 INJECTION, SOLUTION INTRAVENOUS at 13:21

## 2025-07-08 RX ADMIN — INSULIN LISPRO 6 UNITS: 100 INJECTION, SOLUTION INTRAVENOUS; SUBCUTANEOUS at 08:20

## 2025-07-08 RX ADMIN — PREDNISONE 20 MG: 20 TABLET ORAL at 08:16

## 2025-07-08 RX ADMIN — INSULIN LISPRO 5 UNITS: 100 INJECTION, SOLUTION INTRAVENOUS; SUBCUTANEOUS at 08:20

## 2025-07-08 RX ADMIN — INSULIN LISPRO 5 UNITS: 100 INJECTION, SOLUTION INTRAVENOUS; SUBCUTANEOUS at 11:34

## 2025-07-08 RX ADMIN — FERROUS SULFATE TAB 325 MG (65 MG ELEMENTAL FE) 325 MG: 325 (65 FE) TAB at 08:16

## 2025-07-08 RX ADMIN — BUDESONIDE 0.5 MG: 0.5 SUSPENSION RESPIRATORY (INHALATION) at 08:18

## 2025-07-08 RX ADMIN — HYDRALAZINE HYDROCHLORIDE 25 MG: 25 TABLET ORAL at 05:12

## 2025-07-08 RX ADMIN — PANTOPRAZOLE SODIUM 40 MG: 40 TABLET, DELAYED RELEASE ORAL at 05:12

## 2025-07-08 RX ADMIN — TERAZOSIN HYDROCHLORIDE 2 MG: 2 CAPSULE ORAL at 21:24

## 2025-07-08 RX ADMIN — INSULIN LISPRO 8 UNITS: 100 INJECTION, SOLUTION INTRAVENOUS; SUBCUTANEOUS at 17:48

## 2025-07-08 RX ADMIN — CARVEDILOL 12.5 MG: 12.5 TABLET, FILM COATED ORAL at 17:47

## 2025-07-08 RX ADMIN — HEPARIN SODIUM 5000 UNITS: 5000 INJECTION INTRAVENOUS; SUBCUTANEOUS at 08:15

## 2025-07-08 RX ADMIN — CARVEDILOL 12.5 MG: 12.5 TABLET, FILM COATED ORAL at 08:16

## 2025-07-08 RX ADMIN — INSULIN LISPRO 2 UNITS: 100 INJECTION, SOLUTION INTRAVENOUS; SUBCUTANEOUS at 17:48

## 2025-07-08 RX ADMIN — ARFORMOTEROL TARTRATE 15 MCG: 15 SOLUTION RESPIRATORY (INHALATION) at 19:34

## 2025-07-08 RX ADMIN — INSULIN LISPRO 8 UNITS: 100 INJECTION, SOLUTION INTRAVENOUS; SUBCUTANEOUS at 11:35

## 2025-07-08 RX ADMIN — INSULIN LISPRO 5 UNITS: 100 INJECTION, SOLUTION INTRAVENOUS; SUBCUTANEOUS at 21:25

## 2025-07-08 RX ADMIN — ASPIRIN 81 MG: 81 TABLET, COATED ORAL at 08:15

## 2025-07-08 NOTE — CONSULTS
Consult for diabetes education . Chart reviewed. Pt was seen at bedside today. Permission given for visit.       Discussed and taught patient about type 2 diabetes self-management, risk factors, and importance of blood glucose control to reduce complications. Target blood glucose readings and A1c goals per ADA were reviewed. Reviewed with patient current A1c 7.3 and discussed its significance.       Reviewed the following ADA survival skill concepts with pt:     Meal planning: Discussed pts current eating pattern and potential strategies to help improve it.  Suggested “the plate method” as a potential healthy eating plan and reviewed this with pt.    Safe medication administration: Reviewed pts current medication regimen. Encouraged pt to take medications as prescribed and contact provider or pharmacist if they are experiencing side effects.      Monitoring (timing and technique): Encouraged pt to monitor blood sugar at home 2+  times per day and to call PCP if blood sugar is trending high. Discussed benefits of SMBG/CGMS to help guide pt and provider decision making. Encouraged to keep record of blood glucose readings to take to follow up appointment with PCP.     Prevention and treatment of hypoglycemia and hyperglycemia: Signs, symptoms, and treatment of hypoglycemia and hyperglycemia discussed with pt. Reviewed prevention strategies such as consistent eating pattern and taking medications as directed.  Pt is able to teach back appropriate treatment of hypoglycemia using the rule of 15s after receiving instruction.      Sick day management: Reviewed general sick day guidelines with pt, including drinking plenty of water, keeping simple carbs handy such as jell-o or popsicles, checking blood glucose more often (every 2-4 hours or as directed by provider). Encouraged pt to make a sick-day kit at home.      Physical activity: Reviewed benefits of physical activity for diabetes management and overall health.  "Encouraged pt to begin in 10 min increments to build up to recommended 150 minute per week after speaking with doctor about which activities may be right for them.      Follow-up care: Reviewed importance of ongoing follow-up with provider. Reviewed importance of annual physical exams, annual eye exams, regular dental exams, daily foot examination, and encouraged patient to discuss immunization needs with provider. Pt encouraged to attend scheduled appointments.   Provided patient with copy of [facility]'s \"Life with Diabetes\" handout.       Thank you for this consult.      Total time spent reviewing chart, preparing education/materials, providing education at bedside, and coordinating care approx 30 minutes.     "

## 2025-07-08 NOTE — CASE MANAGEMENT/SOCIAL WORK
Continued Stay Note   Talia     Patient Name: Baldemar Anderson  MRN: [id number]  Today's Date: 7/8/2025    Admit Date: 6/30/2025    Plan: update   Discharge Plan       Row Name 07/08/25 1116       Plan    Plan update    Patient/Family in Agreement with Plan yes    Plan Comments Per MDR, likely discharge today.  Spoke with patient at bedside regarding discharge plan, PCT at bedside to walk patient in hallway.  Patient hoping to go home today, denies home needs.  CM following.  Patient plan is to discharge home via car with family to transport.    Final Discharge Disposition Code 01 - home or self-care                   Discharge Codes    No documentation.                 Expected Discharge Date and Time       Expected Discharge Date Expected Discharge Time    Jul 9, 2025               Vivienne Wall RN

## 2025-07-08 NOTE — PLAN OF CARE
Goal Outcome Evaluation:  Plan of Care Reviewed With: patient        Progress: improving  Outcome Evaluation: patient ambulated 150' with CGA x1, tripod rolling walker and tech assist with equipment, ambulated on RA 84%-90% with cues for PLB, when patient focused on PLB O2 increased to 90%, whe returned to room placed 2L O2 increased to 94%, then removed back to RA 93% and discussed calling for nurse of SOA increased. Recommend IRF with focus on pulmonary rehab at D/C when medically appropriate.

## 2025-07-08 NOTE — PROGRESS NOTES
[facility] Medicine Services  PROGRESS NOTE    Patient Name: Baldemar Anderson  : 1950  MRN: [id number]    Date of Admission: 2025  Primary Care Physician: Aparna Mendoza MD    Subjective     CC: f/u hypoxia     HPI:  In chair. Has been off O2 but did wear it for while after walking in the halls. Does note exertional dyspnea. Creatinine up to 2.49 after C yesterday - agreeable to remain in hospital overnight to monitor renal function     Objective     Vital Signs:   Temp:  [97.3 °F (36.3 °C)-97.5 °F (36.4 °C)] 97.3 °F (36.3 °C)  Heart Rate:  [59-80] 73  Resp:  [16-18] 18  BP: (107-193)/() 181/69  Flow (L/min) (Oxygen Therapy):  [1] 1     Physical Exam:  Constitutional: No acute distress, awake, alert and conversant. Sitting upright in chair with family at bedside.   HENT: NCAT, mucous membranes moist  Respiratory: Diminished bilaterally with no overt wheezes, rales or rhonchi, normal respiratory effort on 1L NC  Cardiovascular: RRR  Gastrointestinal: Positive bowel sounds, soft, nontender, nondistended  Musculoskeletal: No bilateral ankle edema  Psychiatric: Appropriate affect, cooperative with exam  Neurologic: Oriented x 3, moves all extremities spontaneously without focal deficits, speech clear    Results Reviewed:  LAB RESULTS:      Lab 25  0730 25  0616 25  0622 25  1022 25  0644 25  1224 25  1148 25  0542   WBC 13.71* 13.59* 15.02* 14.30* 17.97*  --   --  19.63*   HEMOGLOBIN 9.4* 10.1* 10.2* 10.0* 10.9*  --   --  11.1*   HEMATOCRIT 32.1* 33.0* 33.0* 32.8* 36.1*  --   --  36.8*   PLATELETS 238 280 314 306 403  --   --  409   NEUTROS ABS  --   --  13.76* 12.83* 15.89*  --   --  16.12*   IMMATURE GRANS (ABS)  --   --  0.28* 0.19* 0.27*  --   --  0.37*   LYMPHS ABS  --   --  0.58* 0.63* 0.93  --   --  1.67   MONOS ABS  --   --  0.38 0.64 0.85  --   --  1.38*   EOS ABS  --   --  0.00 0.00 0.00  --   --  0.04   MCV 89.9  84.8 85.5 87.7 84.5  --   --  86.2   SED RATE  --   --   --   --   --  >130*  --   --    CRP  --   --   --   --   --   --  1.50*  --          Lab 07/08/25  0730 07/07/25  0616 07/06/25  0622 07/05/25  1022 07/04/25  0523   SODIUM 137 139 142 143 142   POTASSIUM 5.2 4.8 4.8 4.7 4.9   CHLORIDE 109* 110* 113* 114* 110*   CO2 16.0* 19.0* 20.0* 18.2* 21.0*   ANION GAP 12.0 10.0 9.0 10.8 11.0   BUN 99.2* 92.3* 88.4* 85.3* 90.9*   CREATININE 2.49* 2.10* 1.80* 2.13* 2.35*   EGFR 26.4* 32.4* 39.0* 31.9* 28.3*   GLUCOSE 297* 253* 164* 229* 200*   CALCIUM 8.4* 8.6 8.9 8.7 8.7   MAGNESIUM  --   --   --  2.0  --          Lab 07/06/25  0622 07/03/25  1301   PROBNP 1,029.0* 2,989.0*     Brief Urine Lab Results       None          Microbiology Results Abnormal       None          Cardiac Catheterization/Vascular Study  Result Date: 7/7/2025  FINAL     Impression: Mild coronary artery disease 70% proximal right iliac stenosis RECOMMENDATIONS: Further evaluation for pulmonary fibrosis versus significant aortic stenosis will be undertaken now that we know the patient has no significant coronary disease. Indications: Acute pulmonary edema in a patient with multiple risk factors for coronary artery disease, fibrosis versus pulmonary edema on chest x-ray and CT and borderline aortic stenosis by stroke-volume index. Access: Left femoral artery (the right femoral artery was accessed however due to the proximal iliac stenosis no wire would easily traverse the area. Estimated blood loss: Less than 15 mL Procedures: Left heart catheterization. Selective coronary angiography. Procedure narrative: The patient was brought to the catheterization lab in a fasting condition.  Access site was prepped and draped in standard sterile fashion.  Lidocaine was injected and arterial access was obtained by percutaneous anterior wall puncture technique.  A 6 Lithuanian arterial sheath was placed in the right femoral artery using a modified Seldinger technique.   As noted above the wire would not traverse the right iliac and therefore a second sheath was placed in the left femoral artery.  Selective coronary arteriography was performed using the Georgina technique with a 6 Irish 4 curved Georgina right catheter and a 6 Irish 4 curved Georgina left catheter.  Nonionic contrast was used and was injected manually.  No ventriculogram was performed however left heart pressures were obtained.  Following the procedure the patient's sheaths were Tegaderms in place and will be pulled in recovery.  There were no complications. Contrast: 60 ml Hemodynamic Findings: LV pressure: 200/5/8 mmHg, on pull back at 30 mmHg peak to peak gradient was recorded across the aortic valve. Ao pressure: 170/55 mmHg Left ventriculography: Not performed Angiographic Findings: LMCA: The left main coronary artery gives rise to LAD and circumflex vessels as well as a ramus intermedius branch.  The left main coronary contains no significant disease. Circumflex: The circumflex coronary artery is nondominant for the posterior circulation and gives rise to a small first obtuse marginal branch large second obtuse marginal branch and 2 small distal obtuse marginal branches.  Only minimal irregularities of less than 20% are seen in the circumflex. Ramus intermedius: The ramus intermedius is a large vessel which courses anterolaterally and contains no significant disease. LAD: LAD gives rise to a moderate first diagonal branch large second diagonal branch several additional small obtuse marginal branches and terminates near the LV apex.  Mild irregularities are seen throughout the LAD with no stenosis greater than 20 to 30%. RCA: The right coronary artery is dominant for the posterior circulation and gives rise to the PDA as well as several posterolateral branches.  The right coronary artery and its branches contain minimal irregularities with a maximal stenosis in the mid RCA estimated at 30%.     Results for  orders placed during the hospital encounter of 06/30/25    Adult Transthoracic Echo Complete W/ Cont if Necessary Per Protocol 07/01/2025  3:03 PM    Interpretation Summary    Left ventricular systolic function is normal. Calculated left ventricular EF = 61.5%    Left ventricular wall thickness is consistent with hypertrophy.    Left ventricular diastolic function was normal.    The left atrial cavity is dilated.    Left atrial volume is mildly increased.    Moderate aortic valve stenosis is present.    Aortic valve maximum pressure gradient is 38 mmHg. Aortic valve mean pressure gradient is 20 mmHg.    Systolic anterior motion of the anterior mitral leaflet is present.    Current medications:  Scheduled Meds:arformoterol, 15 mcg, Nebulization, BID - RT  aspirin, 81 mg, Oral, Daily  budesonide, 0.5 mg, Nebulization, BID - RT  carvedilol, 12.5 mg, Oral, BID With Meals  dilTIAZem CD, 360 mg, Oral, Q24H  ferrous sulfate, 325 mg, Oral, Daily With Breakfast  heparin (porcine), 5,000 Units, Subcutaneous, Q12H  hydrALAZINE, 25 mg, Oral, Q8H  insulin glargine, 12 Units, Subcutaneous, Nightly  insulin lispro, 2-7 Units, Subcutaneous, 4x Daily AC & at Bedtime  Insulin Lispro, 8 Units, Subcutaneous, TID With Meals  pantoprazole, 40 mg, Oral, Q AM  pravastatin, 20 mg, Oral, Nightly  predniSONE, 20 mg, Oral, Daily With Breakfast  sodium chloride, 10 mL, Intravenous, Q12H  terazosin, 2 mg, Oral, Nightly  [Held by provider] torsemide, 20 mg, Oral, Daily      Continuous Infusions:niCARdipine, 5-15 mg/hr  sodium chloride, 50 mL/hr, Last Rate: 50 mL/hr (07/08/25 1321)      PRN Meds:.  acetaminophen    albuterol    atropine    senna-docusate sodium **AND** polyethylene glycol **AND** bisacodyl **AND** bisacodyl    dextrose    dextrose    glucagon (human recombinant)    HYDROcodone-acetaminophen    nitroglycerin    sodium chloride    sodium chloride    Assessment & Plan     Active Hospital Problems    Diagnosis  POA    **Acute  diastolic heart failure [I50.31]  Yes    Acute respiratory failure with hypoxia [J96.01]  Yes    Anemia [D64.9]  Yes    CHF (congestive heart failure) [I50.9]  Yes    Chronic kidney disease [N18.9]  Yes    COPD (chronic obstructive pulmonary disease) [J44.9]  Yes    Diabetes mellitus [E11.9]  Yes    Dyslipidemia [E78.5]  Yes    Hypertension [I10]  Yes      Resolved Hospital Problems   No resolved problems to display.     Brief Hospital Course to date:  Baldemar Anderson is a 75 y.o. male with PMH significant for HTN, HLD, chronic HFpEF, AS, PAF (not anticoagulated 2/2 history of GIB), chronic respiratory failure 2/2 COPD with prior tobacco abuse (baseline 3-4L NC), DMII, CKD III (baseline creatinine 1.8-2.1) and chronic anemia. He was admitted to [facility] on 6/29/25 for acute respiratory failure with hypoxia felt to be secondary to a/c HFpEF. Pulmonology evaluated and recommended transfer to tertiary center for TAVR evaluation given AS on ECHO. He was transferred to [facility] for higher level of care. Cardiology and pulmonology following      Acute on chronic respiratory failure with hypoxia  Acute on chronic diastolic heart failure  Hypertension  Moderate aortic stenosis  - Per review of records, baseline O2 requirement is 3-4L NC. Per patient, has been on continuous O2 for ~6 months. Was 3L but had to turn up to 4L at some point and was wearing this amount prior to hospital admission   - Required HFNC - had weaned to 4L NC but have since weaned further to 1L NC - RA   - Cardiology, CT surgery and pulmonology following - TAVR work up planned. Needs dental work up prior to procedure. Has appointment to be evaluated on 7/10/25   - VQ scan reassuring   - 7/1/25 ECHO showed EF 61%, LV hypertrophy with normal diastolic function, moderate aortic valve stenosis.   - Cardiology has reviewed ECHO - per cards, the aortic valve appears to open adequately   - s/p intermittent diuresis (was diuresed at [facility]  as well)  - Pulmonology following - s/p IV Solumedrol, hypertonic saline nebs and Mucinex. Feels AS / pulmonary edema most likely culprit   - 7/7 [facility] with no significant CAD. Planning further evaluation for pulmonary fibrosis vs. AS    COPD  Possible bacterial pneumonia  - Continue DuoNebs, Pulmicort and Brovana nebs   - Continue Mucinex  - s/p IV Solumedrol - now on PO Prednisone 20mg daily x 7 days   - s/p IV Cefepime (completed 7/5)  - Blood culture x 2 and sputum culture NGTD  - MRSA PCR / S. Phemo and Legionella urine Ags not collected  - Fibrosis labs sent - notable for elevated CRP / ESR     GERARD on CKD IIIb, resolved  - Per review of records, baseline creatinine 1.8-2.1  - Creatinine 2.89 on arrival to [facility]   - Home Lisinopril and Torsemide on hold  - Ongoing intermittent diuresis  - Creatinine 2.49 after IV Bumex on 7/6 and [facility] on 7/7  - Gentle IVFs today - AM BMP     Uncontrolled HTN  - Cardiology adjusting meds  - Continue Carvedilol, Diltiazem, Hydralazine and Terazosin  - Torsemide on hold    Hyponatremia, resolved   - Sodium is 127 at OSH - s/p Tolvaptan  - Na 138 on arrival and has remained stable      Type 2 diabetes.  - A1c 7.3% - home Glimepiride on hold  - With expected steroid-induced hyperglycemia -   - Continue Lantus 12 units QHS, decrease Lispro to 8 units TID AC + SSI  - Adjust insulin when steroids tapered     Chronic anemia.  - Hgb stable around ~10   - No overt bleeding noted   - Iron normal, iron sat normal, low-normal TBIC and transferrin - likely ACOD     Expected Discharge Location and Transportation: home vs SNF   Expected Discharge Expected Discharge Date: 7/9/2025; Expected Discharge Time:       VTE Prophylaxis:Pharmacologic VTE prophylaxis orders are present.    AM-PAC 6 Clicks Score (PT): 17 (07/08/25 8682)    CODE STATUS:   Code Status and Medical Interventions: CPR (Attempt to Resuscitate); Full Support   Ordered at: 07/01/25 0702     Code Status (Patient has no pulse and is not  breathing):    CPR (Attempt to Resuscitate)     Medical Interventions (Patient has pulse or is breathing):    Full Support     Level Of Support Discussed With:    Patient     Heather Astorga PA-C  07/08/25

## 2025-07-08 NOTE — PROGRESS NOTES
"[facility] Cardiology Daily Note       LOS: 7 days   Patient Care Team:  Aparna Mendoza MD as PCP - General (Family Medicine)    Chief Complaint: Aortic stenosis/pulmonary edema    Subjective     Subjective: No complaints this morning.  Waiting to go home.  90% on room air.  Blood pressures have been labile overnight ranging from 108/81 to 162/51.  Heart rates in the 60s.      Review of Systems:   As above.    Medications:  arformoterol, 15 mcg, Nebulization, BID - RT  aspirin, 81 mg, Oral, Daily  budesonide, 0.5 mg, Nebulization, BID - RT  carvedilol, 12.5 mg, Oral, BID With Meals  dilTIAZem CD, 360 mg, Oral, Q24H  ferrous sulfate, 325 mg, Oral, Daily With Breakfast  heparin (porcine), 5,000 Units, Subcutaneous, Q12H  hydrALAZINE, 25 mg, Oral, Q8H  insulin glargine, 12 Units, Subcutaneous, Nightly  insulin lispro, 2-7 Units, Subcutaneous, 4x Daily AC & at Bedtime  Insulin Lispro, 8 Units, Subcutaneous, TID With Meals  pantoprazole, 40 mg, Oral, Q AM  pravastatin, 20 mg, Oral, Nightly  predniSONE, 20 mg, Oral, Daily With Breakfast  sodium chloride, 10 mL, Intravenous, Q12H  terazosin, 2 mg, Oral, Nightly  [Held by provider] torsemide, 20 mg, Oral, Daily        Objective     Vital Sign Min/Max for last 24 hours  Temp  Min: 97.5 °F (36.4 °C)  Max: 97.5 °F (36.4 °C)   BP  Min: 107/63  Max: 193/67   Pulse  Min: 59  Max: 80   Resp  Min: 16  Max: 18   SpO2  Min: 89 %  Max: 98 %   Flow (L/min) (Oxygen Therapy)  Min: 1  Max: 1   No data recorded      Intake/Output Summary (Last 24 hours) at 7/8/2025 1205  Last data filed at 7/7/2025 1300  Gross per 24 hour   Intake --   Output 400 ml   Net -400 ml        Flowsheet Rows      Flowsheet Row First Filed Value   Admission Height 180.3 cm (71\") Documented at 06/30/2025 2119   Admission Weight 84.3 kg (185 lb 13.6 oz) Documented at 06/30/2025 2119            Physical Exam:    General: Alert and oriented.   Cardiovascular: Heart has a nondisplaced focal PMI. " "Regular rate and rhythm with 2/6 SE murmur, no gallop or rub.  Lungs: No rales or wheezes are heard.  Equal expansion is noted.   Abdomen: Soft, nontender.  Extremities: Show no edema. No hematoma or bruit over the right groin.  Skin: warm and dry.     Results Review:    I reviewed the patient's new clinical results.  EKG:  Tele: Sinus rhythm    Labs:    Results from last 7 days   Lab Units 07/08/25  0730 07/07/25 0616 07/06/25 0622   SODIUM mmol/L 137 139 142   POTASSIUM mmol/L 5.2 4.8 4.8   CHLORIDE mmol/L 109* 110* 113*   CO2 mmol/L 16.0* 19.0* 20.0*   BUN mg/dL 99.2* 92.3* 88.4*   CREATININE mg/dL 2.49* 2.10* 1.80*   CALCIUM mg/dL 8.4* 8.6 8.9   GLUCOSE mg/dL 297* 253* 164*     Results from last 7 days   Lab Units 07/08/25  0730 07/07/25 0616 07/06/25 0622   WBC 10*3/mm3 13.71* 13.59* 15.02*   HEMOGLOBIN g/dL 9.4* 10.1* 10.2*   HEMATOCRIT % 32.1* 33.0* 33.0*   PLATELETS 10*3/mm3 238 280 314     Lab Results   Component Value Date    TROPONINT 37 (H) 07/01/2025    TROPONINT 34 (H) 07/01/2025     No results found for: \"CHOL\"  No results found for: \"TRIG\"  No results found for: \"HDL\"  No components found for: \"LDLCALC\"  Lab Results   Component Value Date    INR 1.16 (H) 07/01/2025    INR 1.06 06/27/2025    INR 1.04 06/21/2025    PROTIME 15.5 (H) 07/01/2025    PROTIME 10.8 06/27/2025    PROTIME 10.9 06/21/2025         Ejection Fraction:    Assessment   Assessment:    Valvular heart disease   Echo 1/2025: Moderate aortic stenosis  Echo 6/30/2025 (outside facility): Report states severe aortic valve stenosis, normal EF but the mean aortic valve gradient is only 22 mmHg.  No significant coronary artery disease at cath 7/7/2025  HFpEF  Echo 6/30/2025 (outside facility): Report states severe aortic valve stenosis, normal EF however the mean aortic valve gradient is only 22 mmHg.  Hypoxemia/possible interstitial lung disease/pneumonia  Hypertension  Hyperlipidemia  Diabetes mellitus  Chronic kidney disease  COPD.  " Possible interstitial lung disease versus fluid  Chronic anemia    Plan:    He is borderline for low-flow low gradient aortic stenosis with a stroke-volume index of 35 mL/min/m2  however the aortic valve appears to open adequately on echo images.  I think with his lack of significant interstitial findings on high-resolution CT and his lack of significant coronary disease that we should proceed with TAVR planning.  The patient will need a dental evaluation prior to TAVR.  Creatinine did increase overnight following contrast administration.  Will hydrate gently and plan discharge for tomorrow if all agree.  He will also need his right iliac stenosis addressed at some point but again that will require contrast and only 60 ml of contrast yesterday caused a bump in creatinine.        Roseann Carrillo MD  07/08/25  12:05 EDT

## 2025-07-08 NOTE — THERAPY TREATMENT NOTE
Patient Name: Baldemar Anderson  : 1950    MRN: [id number]                              Today's Date: 2025       Admit Date: 2025    Visit Dx: No diagnosis found.  Patient Active Problem List   Diagnosis    Anemia    CHF (congestive heart failure)    Chronic kidney disease    COPD (chronic obstructive pulmonary disease)    Diabetes mellitus    Dyslipidemia    Hypertension    Acute diastolic heart failure    Acute respiratory failure with hypoxia     Past Medical History:   Diagnosis Date    Anemia     CHF (congestive heart failure)     Chronic kidney disease     COPD (chronic obstructive pulmonary disease)     Diabetes mellitus     Dyslipidemia     Hypertension      Past Surgical History:   Procedure Laterality Date    BRONCHOSCOPY      CHOLECYSTECTOMY        General Information       Row Name 25 0845          Physical Therapy Time and Intention    Document Type therapy note (daily note)  -AS     Mode of Treatment physical therapy  -AS       Row Name 25 0845          General Information    Patient Profile Reviewed yes  -AS     Existing Precautions/Restrictions cardiac;fall;oxygen therapy device and L/min;other (see comments)  RA pre tx, 2L O2 then back on RA post tx  -AS     Barriers to Rehab medically complex  -AS       Row Name 25 0845          Cognition    Orientation Status (Cognition) oriented to;person;place  -AS       Row Name 25 0845          Safety Issues/Impairments Affecting Functional Mobility    Safety Issues Affecting Function (Mobility) awareness of need for assistance;insight into deficits/self-awareness;sequencing abilities;safety precautions follow-through/compliance;positioning of assistive device  -AS     Impairments Affecting Function (Mobility) balance;cognition;coordination;endurance/activity tolerance;postural/trunk control;shortness of breath;strength  -AS     Cognitive Impairments, Mobility Safety/Performance attention;awareness, need for  assistance;insight into deficits/self-awareness;judgment;problem-solving/reasoning;safety precaution awareness;safety precaution follow-through;sequencing abilities  -AS     Comment, Safety Issues/Impairments (Mobility) alert and following commands  -AS               User Key  (r) = Recorded By, (t) = Taken By, (c) = Cosigned By      Initials Name Provider Type    AS Sameera Ruelas PTA Physical Therapist Assistant                   Mobility       Row Name 07/08/25 0847          Bed Mobility    Comment, (Bed Mobility) UIC pre/post tx  -AS       Row Name 07/08/25 0847          Transfers    Comment, (Transfers) cues for sequencing  -AS       Row Name 07/08/25 0847          Sit-Stand Transfer    Sit-Stand [place] (Transfers) verbal cues;contact guard;1 person assist  -AS     Assistive Device (Sit-Stand Transfers) other (see comments)  -AS     Comment, (Sit-Stand Transfer) TRIPOD ROLLING WALKER  -AS       Row Name 07/08/25 0847          Gait/Stairs (Locomotion)    [place] Level (Gait) verbal cues;contact guard;1 person assist;1 person to manage equipment  -AS     Assistive Device (Gait) other (see comments)  tripod rolling walker  -AS     Distance in Feet (Gait) 150  -AS     Deviations/Abnormal Patterns (Gait) bilateral deviations;linn decreased;stride length decreased  -AS     Bilateral Gait Deviations forward flexed posture;heel strike decreased  -AS     Comment, (Gait/Stairs) patient ambulated 150' with CGA x1, tripod rolling walker and tech assist with equipment, ambulated on RA 84%-90% with cues for PLB, when patient focused on PLB O2 increased to 90%, whe returned to room placed 2L O2 increased to 94%, then removed back to RA 93% and discussed calling for nurse of SOA increased.  -AS               User Key  (r) = Recorded By, (t) = Taken By, (c) = Cosigned By      Initials Name Provider Type    AS Sameera Ruelas PTA Physical Therapist Assistant                   Obj/Interventions       Row  Name 07/08/25 0850          Balance    Dynamic Standing Balance verbal cues;contact guard;1-person assist;1 person to manage equipment  -AS     Position/Device Used, Standing Balance supported;other (see comments)  tripod rolling walker  -AS     Comment, Balance no unsteadiness or LOB noticed  -AS               User Key  (r) = Recorded By, (t) = Taken By, (c) = Cosigned By      Initials Name Provider Type    AS Sameera Ruelas PTA Physical Therapist Assistant                   Goals/Plan    No documentation.                  Clinical Impression       Row Name 07/08/25 0851          Pain    Pretreatment Pain Rating 0/10 - no pain  -AS     Posttreatment Pain Rating 0/10 - no pain  -AS       Row Name 07/08/25 0851          Plan of Care Review    Plan of Care Reviewed With patient  -AS     Progress improving  -AS     Outcome Evaluation patient ambulated 150' with CGA x1, tripod rolling walker and tech assist with equipment, ambulated on RA 84%-90% with cues for PLB, when patient focused on PLB O2 increased to 90%, whe returned to room placed 2L O2 increased to 94%, then removed back to RA 93% and discussed calling for nurse of SOA increased. Recommend IRF with focus on pulmonary rehab at D/C when medically appropriate.  -AS       Row Name 07/08/25 0851          Vital Signs    Pre SpO2 (%) 96  -AS     O2 Delivery Pre Treatment room air  -AS     Intra SpO2 (%) 84  -AS     O2 Delivery Intra Treatment room air  -AS     Post SpO2 (%) 93  -AS     O2 Delivery Post Treatment supplemental O2  -AS     Pre Patient Position Supine  -AS     Intra Patient Position Standing  -AS     Post Patient Position Sitting  -AS       Row Name 07/08/25 0851          Positioning and Restraints    Pre-Treatment Position in bed  -AS     Post Treatment Position chair  -AS     In Chair reclined;call light within reach;encouraged to call for assist;exit alarm on;with family/caregiver;waffle cushion;legs elevated  -AS               User Key  (r) =  Recorded By, (t) = Taken By, (c) = Cosigned By      Initials Name Provider Type    AS Sameera Ruelas PTA Physical Therapist Assistant                   Outcome Measures       Row Name 07/08/25 0852          How much help from another person do you currently need...    Turning from your back to your side while in flat bed without using bedrails? 3  -AS     Moving from lying on back to sitting on the side of a flat bed without bedrails? 3  -AS     Moving to and from a bed to a chair (including a wheelchair)? 3  -AS     Standing up from a chair using your arms (e.g., wheelchair, bedside chair)? 3  -AS     Climbing 3-5 steps with a railing? 2  -AS     To walk in hospital room? 3  -AS     AM-PAC 6 Clicks Score (PT) 17  -AS     Highest Level of Mobility Goal Stand (1 or More Minutes)-5  -AS       Row Name 07/08/25 0852          Functional Assessment    Outcome Measure Options AM-PAC 6 Clicks Basic Mobility (PT)  -AS               User Key  (r) = Recorded By, (t) = Taken By, (c) = Cosigned By      Initials Name Provider Type    AS Sameera Ruelas PTA Physical Therapist Assistant                                 Physical Therapy Education       Title: PT OT SLP Therapies (In Progress)       Topic: Physical Therapy (In Progress)       Point: Mobility training (In Progress)       Learning Progress Summary            Patient Acceptance, E, NR by AS at 7/8/2025 0853    Acceptance, E, NR by KG at 7/3/2025 1422                      Point: Home exercise program (In Progress)       Learning Progress Summary            Patient Acceptance, E, NR by AS at 7/8/2025 0853                      Point: Body mechanics (In Progress)       Learning Progress Summary            Patient Acceptance, E, NR by AS at 7/8/2025 0853    Acceptance, E, NR by KG at 7/3/2025 1422                      Point: Precautions (In Progress)       Learning Progress Summary            Patient Acceptance, E, NR by AS at 7/8/2025 0853    Acceptance, E, NR by  KG at 7/3/2025 1422                                      User Key       Initials Effective Dates Name Provider Type Discipline    AS 12/13/24 -  Sameera Ruelas PTA Physical Therapist Assistant PT    KG 05/22/20 -  Di Dale PT Physical Therapist PT                  PT Recommendation and Plan     Progress: improving  Outcome Evaluation: patient ambulated 150' with CGA x1, tripod rolling walker and tech assist with equipment, ambulated on RA 84%-90% with cues for PLB, when patient focused on PLB O2 increased to 90%, whe returned to room placed 2L O2 increased to 94%, then removed back to RA 93% and discussed calling for nurse of SOA increased. Recommend IRF with focus on pulmonary rehab at D/C when medically appropriate.     Time Calculation:         PT Charges       Row Name 07/08/25 0853             Time Calculation    Start Time 0830  -AS      PT Received On 07/08/25  -AS      PT Goal Re-Cert Due Date 07/13/25  -AS         Timed Charges    23231 - Gait Training Minutes  23  -AS         Total Minutes    Timed Charges Total Minutes 23  -AS       Total Minutes 23  -AS                User Key  (r) = Recorded By, (t) = Taken By, (c) = Cosigned By      Initials Name Provider Type    AS Sameera Ruelas PTA Physical Therapist Assistant                  Therapy Charges for Today       Code Description Service Date Service Provider Modifiers Qty    33207338762 HC GAIT TRAINING EA 15 MIN 7/8/2025 Sameera Ruelas PTA GP 2    41376465259 HC PT THER SUPP EA 15 MIN 7/8/2025 Sameera Ruelas PTA GP 2            PT G-Codes  Outcome Measure Options: AM-PAC 6 Clicks Basic Mobility (PT)  AM-PAC 6 Clicks Score (PT): 17       Sameera Ruelas PTA  7/8/2025

## 2025-07-08 NOTE — PLAN OF CARE
Goal Outcome Evaluation:           Progress: improving  Outcome Evaluation: Vital signs wnl. Oxygen level greater than 90% on room air. No complaints of pain or shortness of breath. Heart rate in 50's. Normal saline started per orders. Family at bedside. Call bell within reach.

## 2025-07-08 NOTE — PROGRESS NOTES
Pulmonary/Critical Care Follow-up     LOS: 7 days   Patient Care Team:  Aparna Mendoza MD as PCP - General (Family Medicine)    Chief Complaint: Hypoxemic respiratory failure      Subjective     History reviewed and updated in EMR as indicated.    Interval History:     Patient is overall feeling much better.  He is on 1 L supplemental oxygen currently.  He has been off and on 1 L in room air.  No fevers or chills.      History taken from: Patient, chart, staff    PMH/FH/Social History were reviewed and updated appropriately in the electronic medical record.     Review of Systems:    Review of 14 systems was completed with positives and pertinent negatives noted in the subjective section.  All other systems reviewed and are negative.         Objective     Vital Signs  Temp:  [97.3 °F (36.3 °C)-97.5 °F (36.4 °C)] 97.3 °F (36.3 °C)  Heart Rate:  [53-80] 54  Resp:  [16-18] 18  BP: (107-193)/() 181/69  07/07 0701 - 07/08 0700  In: -   Out: 400 [Urine:400]  Body mass index is 25.06 kg/m².     IV drips:  niCARdipine  sodium chloride, Last Rate: 50 mL/hr (07/08/25 1321)       Physical Exam:     Constitutional:   Alert, in no acute distress   Head:   Normocephalic, atraumatic   Eyes:           Lids and lashes normal, conjunctivae and sclerae normal.  PER   ENMT:  Ears appear intact with no abnormalities noted     Lips normal.     Neck:  Trachea midline, no JVD   Lungs/Resp:    Normal effort, symmetric chest rise, no crepitus, mild bibasilar rales.               Heart/CV:   Regular rhythm and normal rate, grade 3 out of 6 crescendo decrescendo murmur right upper sternal border.   Abdomen/GI:    Soft, nontender, nondistended   :    Deferred   Extremities/MSK:  No clubbing or cyanosis.  Trace lower extremity edema.     Pulses:  Pulses palpable and equal bilaterally   Skin:  No bleeding, bruising or rash   Heme/Lymph:  No cervical or supraclavicular adenopathy.   Neurologic:    Psychiatric:    Moves all extremities  with no obvious focal motor deficit.  Cranial nerves 2 - 12 grossly intact  Non-agitated, normal affect.    The above physical exam findings were reviewed and reflect my exam findings as of today's exam.   Electronically signed by:  Yaya Fulton MD  07/08/25  14:54 EDT      Results Review:     I reviewed the patient's new clinical results.   Results from last 7 days   Lab Units 07/08/25  0730 07/07/25  0616 07/06/25  0622   SODIUM mmol/L 137 139 142   POTASSIUM mmol/L 5.2 4.8 4.8   CHLORIDE mmol/L 109* 110* 113*   CO2 mmol/L 16.0* 19.0* 20.0*   BUN mg/dL 99.2* 92.3* 88.4*   CREATININE mg/dL 2.49* 2.10* 1.80*   CALCIUM mg/dL 8.4* 8.6 8.9   GLUCOSE mg/dL 297* 253* 164*     Results from last 7 days   Lab Units 07/08/25  0730 07/07/25  0616 07/06/25  0622   WBC 10*3/mm3 13.71* 13.59* 15.02*   HEMOGLOBIN g/dL 9.4* 10.1* 10.2*   HEMATOCRIT % 32.1* 33.0* 33.0*   PLATELETS 10*3/mm3 238 280 314         Results from last 7 days   Lab Units 07/05/25  1022   MAGNESIUM mg/dL 2.0       I reviewed the patient's new imaging including images and reports.    HRCT chest 7/2/2025 reviewed and shows significant emphysema with pulmonary edema.    Medication Review:   arformoterol, 15 mcg, Nebulization, BID - RT  aspirin, 81 mg, Oral, Daily  budesonide, 0.5 mg, Nebulization, BID - RT  carvedilol, 12.5 mg, Oral, BID With Meals  dilTIAZem CD, 360 mg, Oral, Q24H  heparin (porcine), 5,000 Units, Subcutaneous, Q12H  hydrALAZINE, 25 mg, Oral, Q8H  insulin glargine, 12 Units, Subcutaneous, Nightly  insulin lispro, 2-7 Units, Subcutaneous, 4x Daily AC & at Bedtime  Insulin Lispro, 8 Units, Subcutaneous, TID With Meals  pantoprazole, 40 mg, Oral, Q AM  pravastatin, 20 mg, Oral, Nightly  predniSONE, 20 mg, Oral, Daily With Breakfast  sodium chloride, 10 mL, Intravenous, Q12H  terazosin, 2 mg, Oral, Nightly  [Held by provider] torsemide, 20 mg, Oral, Daily      niCARdipine, 5-15 mg/hr  sodium chloride, 50 mL/hr, Last Rate: 50 mL/hr (07/08/25  1321)        Assessment & Plan         Acute diastolic heart failure    Anemia    CHF (congestive heart failure)    Chronic kidney disease    COPD (chronic obstructive pulmonary disease)    Diabetes mellitus    Dyslipidemia    Hypertension    Acute respiratory failure with hypoxia    75 y.o. male with history of HTN, HLD, CHF, T2DM, anemia, COPD, tobacco abuse, recently found to have severe aortic stenosis and transferred for TAVR evaluation requiring high flow nasal cannula oxygen.  Patient has been diuresed with significant improvement in oxygen saturation, currently tolerating room air with oxygen saturations of 90%.    Initial concern for interstitial lung disease based on imaging, which shows emphysema with an overlying pattern of pulmonary edema.  Given his significant clinical improvement with diuresis alone, the likelihood that his findings are primarily related to interstitial lung disease is extremely low.  A serologic evaluation was sent off and is currently pending.    I am told plan is ultimately for TAVR after patient has his teeth extracted.  From a pulmonary standpoint, he can go home and already has home oxygen in place.  He is currently alternating between room air and 1 L supplemental oxygen.  Patient snores and I would recommend an outpatient sleep study.  He should have PFTs at follow-up appointment as well as consideration for 6-minute walk test for oxygen titration.    Plan:  1.  For acute hypoxemic respiratory failure: This is due to pulmonary edema from valvular heart disease.  Recommend focusing management on addressing this issue.  He has gone from high flow nasal cannula to room air with diuresis, quite rapidly.  This is not consistent with hypoxia from interstitial lung disease, but is consistent with hypoxemia secondary to pulmonary edema.  I will discontinue Solu-Medrol and changed to prednisone 20 mg daily for 7 days and then stop.  Recommend following up with Dr. Tong as an  outpatient in 4 to 6 weeks to review labs and recommend PFTs and consideration of 6-minute walk testing at that time.  2.  For aortic stenosis: Patient undergoing TAVR workup.  Plan appears to be for TAVR once he has his teeth extracted.  3.  For acute hypoxemic respiratory failure: Essentially resolved, tolerating room air currently with oxygen saturations of 90%.  Recommend ongoing management of valvular heart disease.  Had a bump in creatinine in the setting of diuresis so staying in the hospital for now.  4.  For COPD: Continue bronchodilator/inhaled corticosteroids.  I would recommend continuing home Symbicort.  Recommend PFTs as outpatient at follow-up.    Call with questions.    Electronically signed by:    Yaya Fulton MD  07/08/25  14:54 EDT      *. Please note that portions of this note were completed with [employer] - a voice recognition program.

## 2025-07-08 NOTE — PROGRESS NOTES
[facility] Cardiothoracic Surgery In-Patient Progress Note     LOS: 7 days     Chief Complaint: Aortic stenosis    Subjective  Up in bed eating breakfast, no complaints     Objective  Vital Signs  Temp:  [97.5 °F (36.4 °C)] 97.5 °F (36.4 °C)  Heart Rate:  [59-80] 63  Resp:  [16-18] 16  BP: (107-193)/() 155/41    Physical Exam:   General Appearance: alert, appears stated age and cooperative   Lungs: Diminished bases bilaterally   Heart: Grade II systolic murmur noted   Ext: No edema  Results     Results from last 7 days   Lab Units 07/07/25  0616   WBC 10*3/mm3 13.59*   HEMOGLOBIN g/dL 10.1*   HEMATOCRIT % 33.0*   PLATELETS 10*3/mm3 280     Results from last 7 days   Lab Units 07/07/25  0616   SODIUM mmol/L 139   POTASSIUM mmol/L 4.8   CHLORIDE mmol/L 110*   CO2 mmol/L 19.0*   BUN mg/dL 92.3*   CREATININE mg/dL 2.10*   GLUCOSE mg/dL 253*   CALCIUM mg/dL 8.6     Carotid duplex    Right internal carotid artery demonstrates a less than 50% stenosis.    Antegrade right vertebral flow.    Left internal carotid artery demonstrates a less than 50% stenosis.    Antegrade left vertebral flow.    TTE: 7/1/2025    Left ventricular systolic function is normal. Calculated left ventricular EF = 61.5%    Left ventricular wall thickness is consistent with hypertrophy.    Left ventricular diastolic function was normal.    The left atrial cavity is dilated.    Left atrial volume is mildly increased.    Moderate aortic valve stenosis is present.    Aortic valve maximum pressure gradient is 38 mmHg. Aortic valve mean pressure gradient is 20 mmHg.    Systolic anterior motion of the anterior mitral leaflet is present.    Angiographic Findings:      LMCA: The left main coronary artery gives rise to LAD and circumflex vessels as well as a ramus intermedius branch.  The left main coronary contains no significant disease.     Circumflex: The circumflex coronary artery is nondominant for the posterior circulation and gives rise to a  small first obtuse marginal branch large second obtuse marginal branch and 2 small distal obtuse marginal branches.  Only minimal irregularities of less than 20% are seen in the circumflex.     Ramus intermedius: The ramus intermedius is a large vessel which courses anterolaterally and contains no significant disease.     LAD: LAD gives rise to a moderate first diagonal branch large second diagonal branch several additional small obtuse marginal branches and terminates near the LV apex.  Mild irregularities are seen throughout the LAD with no stenosis greater than 20 to 30%.     RCA: The right coronary artery is dominant for the posterior circulation and gives rise to the PDA as well as several posterolateral branches.  The right coronary artery and its branches contain minimal irregularities with a maximal stenosis in the mid RCA estimated at 30%.    Assessment    Acute diastolic heart failure    Anemia    CHF (congestive heart failure)    Chronic kidney disease    COPD (chronic obstructive pulmonary disease)    Diabetes mellitus    Dyslipidemia    Hypertension    Acute respiratory failure with hypoxia      Plan   Cardiac cath yesterday found mild coronary disease, 70% right iliac stenosis   Patient is needing dental work completed so TAVR will not likely to occur this admission  Discharge per primary service at any time per CT surgery concerns     Harper Benitez PA-C  07/08/25  07:05 EDT

## 2025-07-09 ENCOUNTER — READMISSION MANAGEMENT (OUTPATIENT)
Dept: CALL CENTER | Facility: HOSPITAL | Age: 75
End: 2025-07-09
Payer: MEDICARE

## 2025-07-09 VITALS
HEART RATE: 61 BPM | WEIGHT: 182.1 LBS | BODY MASS INDEX: 25.49 KG/M2 | TEMPERATURE: 98.5 F | DIASTOLIC BLOOD PRESSURE: 65 MMHG | RESPIRATION RATE: 18 BRPM | SYSTOLIC BLOOD PRESSURE: 161 MMHG | HEIGHT: 71 IN | OXYGEN SATURATION: 95 %

## 2025-07-09 LAB
ANION GAP SERPL CALCULATED.3IONS-SCNC: 7 MMOL/L (ref 5–15)
BUN SERPL-MCNC: 93.2 MG/DL (ref 8–23)
BUN/CREAT SERPL: 41.1 (ref 7–25)
CALCIUM SPEC-SCNC: 8.1 MG/DL (ref 8.6–10.5)
CHLORIDE SERPL-SCNC: 112 MMOL/L (ref 98–107)
CO2 SERPL-SCNC: 18 MMOL/L (ref 22–29)
CREAT SERPL-MCNC: 2.27 MG/DL (ref 0.76–1.27)
EGFRCR SERPLBLD CKD-EPI 2021: 29.5 ML/MIN/1.73
GLUCOSE BLDC GLUCOMTR-MCNC: 152 MG/DL (ref 70–130)
GLUCOSE BLDC GLUCOMTR-MCNC: 152 MG/DL (ref 70–130)
GLUCOSE SERPL-MCNC: 131 MG/DL (ref 65–99)
POTASSIUM SERPL-SCNC: 4.9 MMOL/L (ref 3.5–5.2)
SODIUM SERPL-SCNC: 137 MMOL/L (ref 136–145)

## 2025-07-09 PROCEDURE — 63710000001 INSULIN LISPRO (HUMAN) PER 5 UNITS: Performed by: PHYSICIAN ASSISTANT

## 2025-07-09 PROCEDURE — 99239 HOSP IP/OBS DSCHRG MGMT >30: CPT | Performed by: PHYSICIAN ASSISTANT

## 2025-07-09 PROCEDURE — 82948 REAGENT STRIP/BLOOD GLUCOSE: CPT

## 2025-07-09 PROCEDURE — 99232 SBSQ HOSP IP/OBS MODERATE 35: CPT | Performed by: INTERNAL MEDICINE

## 2025-07-09 PROCEDURE — 94761 N-INVAS EAR/PLS OXIMETRY MLT: CPT

## 2025-07-09 PROCEDURE — 63710000001 INSULIN LISPRO (HUMAN) PER 5 UNITS: Performed by: INTERNAL MEDICINE

## 2025-07-09 PROCEDURE — 25010000002 HEPARIN (PORCINE) PER 1000 UNITS: Performed by: INTERNAL MEDICINE

## 2025-07-09 PROCEDURE — 94799 UNLISTED PULMONARY SVC/PX: CPT

## 2025-07-09 PROCEDURE — 80048 BASIC METABOLIC PNL TOTAL CA: CPT | Performed by: INTERNAL MEDICINE

## 2025-07-09 PROCEDURE — 99231 SBSQ HOSP IP/OBS SF/LOW 25: CPT | Performed by: PHYSICIAN ASSISTANT

## 2025-07-09 PROCEDURE — 63710000001 PREDNISONE PER 1 MG: Performed by: INTERNAL MEDICINE

## 2025-07-09 RX ORDER — PREDNISONE 20 MG/1
20 TABLET ORAL
Qty: 5 TABLET | Refills: 0 | Status: SHIPPED | OUTPATIENT
Start: 2025-07-10 | End: 2025-07-15

## 2025-07-09 RX ORDER — TORSEMIDE 10 MG/1
TABLET ORAL
Qty: 60 TABLET | Refills: 2 | Status: SHIPPED | OUTPATIENT
Start: 2025-07-09

## 2025-07-09 RX ORDER — HYDRALAZINE HYDROCHLORIDE 25 MG/1
25 TABLET, FILM COATED ORAL EVERY 8 HOURS SCHEDULED
Qty: 30 TABLET | Refills: 2 | Status: SHIPPED | OUTPATIENT
Start: 2025-07-09

## 2025-07-09 RX ORDER — TERAZOSIN 2 MG/1
2 CAPSULE ORAL NIGHTLY
Qty: 30 CAPSULE | Refills: 2 | Status: SHIPPED | OUTPATIENT
Start: 2025-07-09

## 2025-07-09 RX ORDER — GLIMEPIRIDE 1 MG/1
1 TABLET ORAL
Start: 2025-07-09

## 2025-07-09 RX ORDER — CARVEDILOL 12.5 MG/1
12.5 TABLET ORAL 2 TIMES DAILY WITH MEALS
Qty: 60 TABLET | Refills: 2 | Status: SHIPPED | OUTPATIENT
Start: 2025-07-09

## 2025-07-09 RX ADMIN — HYDRALAZINE HYDROCHLORIDE 25 MG: 25 TABLET ORAL at 05:46

## 2025-07-09 RX ADMIN — BUDESONIDE 0.5 MG: 0.5 SUSPENSION RESPIRATORY (INHALATION) at 07:32

## 2025-07-09 RX ADMIN — ASPIRIN 81 MG: 81 TABLET, COATED ORAL at 09:06

## 2025-07-09 RX ADMIN — INSULIN LISPRO 8 UNITS: 100 INJECTION, SOLUTION INTRAVENOUS; SUBCUTANEOUS at 09:06

## 2025-07-09 RX ADMIN — HEPARIN SODIUM 5000 UNITS: 5000 INJECTION INTRAVENOUS; SUBCUTANEOUS at 09:06

## 2025-07-09 RX ADMIN — DILTIAZEM HYDROCHLORIDE 360 MG: 180 CAPSULE, EXTENDED RELEASE ORAL at 09:06

## 2025-07-09 RX ADMIN — INSULIN LISPRO 2 UNITS: 100 INJECTION, SOLUTION INTRAVENOUS; SUBCUTANEOUS at 09:07

## 2025-07-09 RX ADMIN — CARVEDILOL 12.5 MG: 12.5 TABLET, FILM COATED ORAL at 09:06

## 2025-07-09 RX ADMIN — INSULIN LISPRO 2 UNITS: 100 INJECTION, SOLUTION INTRAVENOUS; SUBCUTANEOUS at 12:07

## 2025-07-09 RX ADMIN — PANTOPRAZOLE SODIUM 40 MG: 40 TABLET, DELAYED RELEASE ORAL at 05:47

## 2025-07-09 RX ADMIN — HYDRALAZINE HYDROCHLORIDE 25 MG: 25 TABLET ORAL at 12:07

## 2025-07-09 RX ADMIN — ARFORMOTEROL TARTRATE 15 MCG: 15 SOLUTION RESPIRATORY (INHALATION) at 07:32

## 2025-07-09 RX ADMIN — INSULIN LISPRO 8 UNITS: 100 INJECTION, SOLUTION INTRAVENOUS; SUBCUTANEOUS at 12:06

## 2025-07-09 RX ADMIN — PREDNISONE 20 MG: 20 TABLET ORAL at 09:06

## 2025-07-09 NOTE — CASE MANAGEMENT/SOCIAL WORK
Case Management Discharge Note      Final Note: Patient has order for discharge. Spoke with patient and family at bedside regarding discharge plan. Patient reports he has his home O2 for transport and will call for his brother to come and take him home. No discharge needs verbalized. Patient plan is to discharge home today via car with family to transport.         Selected Continued Care - Admitted Since 6/30/2025       Destination    No services have been selected for the patient.                Durable Medical Equipment Coordination complete.      Service Provider Services Address Phone Fax Patient Preferred    [facility] [address] [place] [address] [phone] --       Internal Comment last updated by Sergio Parnell, RN 7/1/2025 1502    Home O2                         Dialysis/Infusion    No services have been selected for the patient.                Home Medical Care    No services have been selected for the patient.                Therapy    No services have been selected for the patient.                Community Resources    No services have been selected for the patient.                Community & DME    No services have been selected for the patient.                         Final Discharge Disposition Code: 01 - home or self-care

## 2025-07-09 NOTE — PROGRESS NOTES
"[facility] Cardiology Daily Note       LOS: 8 days   Patient Care Team:  Aparna Mendoza MD as PCP - General (Family Medicine)    Chief Complaint: Aortic stenosis/pulmonary edema    Subjective     Subjective:  No complaints.  Happy to be going home. 91% saturated on 1 L nasal cannula.  Blood pressures have been good overnight.  Heart rates in the 60s.      Review of Systems:   As above.    Medications:  arformoterol, 15 mcg, Nebulization, BID - RT  aspirin, 81 mg, Oral, Daily  budesonide, 0.5 mg, Nebulization, BID - RT  carvedilol, 12.5 mg, Oral, BID With Meals  dilTIAZem CD, 360 mg, Oral, Q24H  heparin (porcine), 5,000 Units, Subcutaneous, Q12H  hydrALAZINE, 25 mg, Oral, Q8H  insulin glargine, 12 Units, Subcutaneous, Nightly  insulin lispro, 2-7 Units, Subcutaneous, 4x Daily AC & at Bedtime  Insulin Lispro, 8 Units, Subcutaneous, TID With Meals  pantoprazole, 40 mg, Oral, Q AM  pravastatin, 20 mg, Oral, Nightly  predniSONE, 20 mg, Oral, Daily With Breakfast  sodium chloride, 10 mL, Intravenous, Q12H  terazosin, 2 mg, Oral, Nightly  [Held by provider] torsemide, 20 mg, Oral, Daily        Objective     Vital Sign Min/Max for last 24 hours  Temp  Min: 97.3 °F (36.3 °C)  Max: 98.7 °F (37.1 °C)   BP  Min: 132/45  Max: 181/69   Pulse  Min: 53  Max: 73   Resp  Min: 17  Max: 18   SpO2  Min: 89 %  Max: 100 %   Flow (L/min) (Oxygen Therapy)  Min: 1  Max: 1   Weight  Min: 82.6 kg (182 lb 1.6 oz)  Max: 82.6 kg (182 lb 1.6 oz)      Intake/Output Summary (Last 24 hours) at 7/9/2025 0614  Last data filed at 7/8/2025 2100  Gross per 24 hour   Intake 250 ml   Output --   Net 250 ml        Flowsheet Rows      Flowsheet Row First Filed Value   Admission Height 180.3 cm (71\") Documented at 06/30/2025 2119   Admission Weight 84.3 kg (185 lb 13.6 oz) Documented at 06/30/2025 2119            Physical Exam:    General: Alert and oriented.   Cardiovascular: Heart has a nondisplaced focal PMI. Regular rate and rhythm " "with 2/6 SE murmur, no gallop or rub.  Lungs: No rales or wheezes are heard.  Equal expansion is noted.   Abdomen: Soft, nontender.  Extremities: Show no edema.   Skin: warm and dry.     Results Review:    I reviewed the patient's new clinical results.  EKG:  Tele: Sinus rhythm    Labs:    Results from last 7 days   Lab Units 07/08/25  0730 07/07/25  0616 07/06/25  0622   SODIUM mmol/L 137 139 142   POTASSIUM mmol/L 5.2 4.8 4.8   CHLORIDE mmol/L 109* 110* 113*   CO2 mmol/L 16.0* 19.0* 20.0*   BUN mg/dL 99.2* 92.3* 88.4*   CREATININE mg/dL 2.49* 2.10* 1.80*   CALCIUM mg/dL 8.4* 8.6 8.9   GLUCOSE mg/dL 297* 253* 164*     Results from last 7 days   Lab Units 07/08/25  0730 07/07/25  0616 07/06/25  0622   WBC 10*3/mm3 13.71* 13.59* 15.02*   HEMOGLOBIN g/dL 9.4* 10.1* 10.2*   HEMATOCRIT % 32.1* 33.0* 33.0*   PLATELETS 10*3/mm3 238 280 314     Lab Results   Component Value Date    TROPONINT 37 (H) 07/01/2025    TROPONINT 34 (H) 07/01/2025     No results found for: \"CHOL\"  No results found for: \"TRIG\"  No results found for: \"HDL\"  No components found for: \"LDLCALC\"  Lab Results   Component Value Date    INR 1.16 (H) 07/01/2025    INR 1.06 06/27/2025    INR 1.04 06/21/2025    PROTIME 15.5 (H) 07/01/2025    PROTIME 10.8 06/27/2025    PROTIME 10.9 06/21/2025         Ejection Fraction:    Assessment   Assessment:    Valvular heart disease   Echo 1/2025: Moderate aortic stenosis  Echo 6/30/2025 (outside facility): Report states severe aortic valve stenosis, normal EF but the mean aortic valve gradient is only 22 mmHg.  No significant coronary artery disease at cath 7/7/2025  HFpEF  Echo 6/30/2025 (outside facility): Report states severe aortic valve stenosis, normal EF however the mean aortic valve gradient is only 22 mmHg.  Hypoxemia/possible interstitial lung disease/pneumonia  Hypertension  Hyperlipidemia  Diabetes mellitus  Chronic kidney disease  COPD.  Possible interstitial lung disease versus fluid  Chronic " anemia    Plan:    He is borderline for low-flow low gradient aortic stenosis with a stroke-volume index of 35 mL/min/m2  however the aortic valve appears to open adequately on echo images.  I think with his lack of significant interstitial findings on high-resolution CT and his lack of significant coronary disease that we should proceed with TAVR planning.  The patient will need a dental evaluation prior to TAVR.  Creatinine did increase following contrast administration with cath.    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  Renal function has improved today.  Anticipate discharge home later today.  Would recommend torsemide 10 mg daily with an extra 10 mg as needed for 3 pound weight gain.  The patient will follow-up with Dr. Rodriguez locally shortly after discharge.        Roseann Carrillo MD  07/09/25  06:14 EDT

## 2025-07-09 NOTE — DISCHARGE SUMMARY
[facility] Medicine Services  DISCHARGE SUMMARY    Patient Name: Baldemar Anderson  : 1950  MRN: [id number]    Date of Admission: 2025  9:01 PM  Date of Discharge:  2025  Primary Care Physician: Aparna Mendoza MD    Consults       Date and Time Order Name Status Description    2025  9:07 AM Inpatient Pulmonology Consult Completed     2025 10:54 AM Inpatient Cardiothoracic Surgery Consult Completed     2025  2:43 AM Inpatient Cardiology Consult Completed           Hospital Course     Active Hospital Problems    Diagnosis  POA    **Acute diastolic heart failure [I50.31]  Yes    Acute respiratory failure with hypoxia [J96.01]  Yes    Anemia [D64.9]  Yes    CHF (congestive heart failure) [I50.9]  Yes    Chronic kidney disease [N18.9]  Yes    COPD (chronic obstructive pulmonary disease) [J44.9]  Yes    Diabetes mellitus [E11.9]  Yes    Dyslipidemia [E78.5]  Yes    Hypertension [I10]  Yes      Resolved Hospital Problems   No resolved problems to display.      Hospital Course:  Baldemar Anderson is a 75 y.o. male with PMH significant for HTN, HLD, chronic HFpEF, AS, PAF (not anticoagulated 2/2 history of GIB), chronic respiratory failure 2/2 COPD with prior tobacco abuse (baseline 3-4L NC), DMII, CKD III (baseline creatinine 1.8-2.1) and chronic anemia. He was admitted to [facility] on 25 for acute respiratory failure with hypoxia felt to be secondary to a/c HFpEF. Pulmonology evaluated and recommended transfer to tertiary center for TAVR evaluation given AS on ECHO. He was transferred to [facility] for higher level of care. Cardiology and pulmonology following      Acute on chronic respiratory failure with hypoxia  Acute on chronic diastolic heart failure  Hypertension  Moderate aortic stenosis  - Per review of records, baseline O2 requirement is 3-4L NC. Per patient, has been on continuous O2 for ~6 months. Was 3L but had to turn up to 4L at  some point and was wearing this amount prior to hospital admission   - Required HFNC - had weaned to 4L NC but have since weaned further to 1L NC - RA. Plan to continue O2 at DC at lower rate   - VQ scan reassuring   - 7/1/25 ECHO showed EF 61%, LV hypertrophy with normal diastolic function, moderate aortic valve stenosis.   - Cardiology has reviewed ECHO - per cards, the aortic valve appears to open adequately   - s/p intermittent diuresis (was diuresed at [facility] as well)  - Pulmonology following - s/p IV Solumedrol, hypertonic saline nebs and Mucinex. Feels AS / pulmonary edema most likely culprit   - 7/7 [facility] with no significant CAD   - Cardiology, CT surgery and pulmonology have followed. Likely TAVR on an outpatient basis following completion of work up. Needs dental work up prior to procedure. Has appointment to be evaluated on 7/10/25. Will need OP CT chest.   - DC on Torsemide 10mg daily, additional 10mg daily PRN for dyspnea, weight gain or swelling  - Follow up with  [facility]     R iliac stenosis  - Incidental finding during LHC - will need to be addressed at some point but poor renal function may be prohibitive      COPD  Possible bacterial pneumonia  - Continue DuoNebs, Pulmicort and Brovana nebs   - Continue Mucinex  - s/p IV Solumedrol - now on PO Prednisone 20mg daily x 7 days   - s/p IV Cefepime (completed 7/5)  - Blood culture x 2 and sputum culture NGTD  - MRSA PCR / S. Phemo and Legionella urine Ags not collected  - Fibrosis labs sent - notable for elevated CRP / ESR      GERARD on CKD IIIb, resolved  - Per review of records, baseline creatinine 1.8-2.1  - Creatinine 2.89 on arrival to [facility]   - Ongoing intermittent diuresis  - Creatinine 2.49 after IV Bumex on 7/6 and LHC on 7/7  - s/p gentle IVFs (500mL) on 7/8 with improvement to 2.2 by day of discharge  - Needs close follow up with his primary nephrology outpatient      Uncontrolled HTN  - Cardiology adjusting meds  - Continue Carvedilol,  Diltiazem, Hydralazine and Terazosin at DC  - Stop Nifedecipine  - Low dose daily Torsemide to start at DC as above      Hyponatremia, resolved   - Sodium is 127 at OSH - s/p Tolvaptan  - Na 138 on arrival and has remained stable      Type 2 diabetes.  - A1c 7.3% - can resume home Glimepiride at DC on hold  - Did have expected steroid-induced hyperglycemia while on Solumedrol      Chronic anemia.  - Hgb stable around ~10   - No overt bleeding noted   - Iron normal, iron sat normal, low-normal TBIC and transferrin - likely ACOD     Day of Discharge     HPI:   Sitting in chair. Feeling good and very excited to be going home today. We discussed discharge instructions at bedside    Review of Systems  Gen- No fevers, chills  CV- No chest pain, palpitations  Resp- No cough, dyspnea  GI- No N/V/D, abd pain    Vital Signs:   Temp:  [97.8 °F (36.6 °C)-98.7 °F (37.1 °C)] 98.5 °F (36.9 °C)  Heart Rate:  [53-71] 61  Resp:  [17-18] 18  BP: (132-165)/(39-99) 161/65  Flow (L/min) (Oxygen Therapy):  [1] 1    Physical Exam:  Constitutional: No acute distress, awake, alert and conversant. Sitting upright in chair with family at bedside.   HENT: NCAT, mucous membranes moist  Respiratory: Diminished bilaterally with no overt wheezes, rales or rhonchi, normal respiratory effort on 1L NC  Cardiovascular: RRR  Gastrointestinal: Positive bowel sounds, soft, nontender, nondistended  Musculoskeletal: No bilateral ankle edema  Psychiatric: Appropriate affect, cooperative with exam  Neurologic: Oriented x 3, moves all extremities spontaneously without focal deficits, speech clear    Pertinent  and/or Most Recent Results     LAB RESULTS:      Lab 07/08/25  0730 07/07/25  0616 07/06/25  0622 07/05/25  1022 07/03/25  0644   WBC 13.71* 13.59* 15.02* 14.30* 17.97*   HEMOGLOBIN 9.4* 10.1* 10.2* 10.0* 10.9*   HEMATOCRIT 32.1* 33.0* 33.0* 32.8* 36.1*   PLATELETS 238 280 314 306 403   NEUTROS ABS  --   --  13.76* 12.83* 15.89*   IMMATURE GRANS (ABS)   --   --  0.28* 0.19* 0.27*   LYMPHS ABS  --   --  0.58* 0.63* 0.93   MONOS ABS  --   --  0.38 0.64 0.85   EOS ABS  --   --  0.00 0.00 0.00   MCV 89.9 84.8 85.5 87.7 84.5         Lab 07/09/25  0638 07/08/25  0730 07/07/25  0616 07/06/25  0622 07/05/25  1022   SODIUM 137 137 139 142 143   POTASSIUM 4.9 5.2 4.8 4.8 4.7   CHLORIDE 112* 109* 110* 113* 114*   CO2 18.0* 16.0* 19.0* 20.0* 18.2*   ANION GAP 7.0 12.0 10.0 9.0 10.8   BUN 93.2* 99.2* 92.3* 88.4* 85.3*   CREATININE 2.27* 2.49* 2.10* 1.80* 2.13*   EGFR 29.5* 26.4* 32.4* 39.0* 31.9*   GLUCOSE 131* 297* 253* 164* 229*   CALCIUM 8.1* 8.4* 8.6 8.9 8.7   MAGNESIUM  --   --   --   --  2.0         Lab 07/06/25  0622 07/03/25  1301   PROBNP 1,029.0* 2,989.0*         Lab 07/07/25  0616 07/06/25  0622   IRON  --  116   IRON SATURATION (TSAT)  --  36   TIBC  --  323   TRANSFERRIN  --  217   FERRITIN  --  356.00   FOLATE >20.00  --    VITAMIN B 12 760  --      Brief Urine Lab Results       None          Microbiology Results (last 10 days)       Procedure Component Value - Date/Time    Respiratory Culture - Sputum, Cough [[id number]] Collected: 07/01/25 0848    Lab Status: Final result Specimen: Sputum from Cough Updated: 07/03/25 1053     Respiratory Culture Rare growth Normal respiratory kadi. No S. aureus or Pseudomonas aeruginosa detected. Final report.     Gram Stain Moderate (3+) WBCs per low power field      Rare (1+) Epithelial cells per low power field      No organisms seen      Occasional Yeast    Blood Culture - Blood, Hand, Right [[id number]]  (Normal) Collected: 07/01/25 0429    Lab Status: Final result Specimen: Blood from Hand, Right Updated: 07/06/25 0830     Blood Culture No growth at 5 days    Blood Culture - Blood, Hand, Left [[id number]]  (Normal) Collected: 07/01/25 0425    Lab Status: Final result Specimen: Blood from Hand, Left Updated: 07/06/25 0830     Blood Culture No growth at 5 days          Cardiac Catheterization/Vascular Study  Result Date:  7/7/2025  FINAL     Mild coronary artery disease 70% proximal right iliac stenosis RECOMMENDATIONS: Further evaluation for pulmonary fibrosis versus significant aortic stenosis will be undertaken now that we know the patient has no significant coronary disease. Indications: Acute pulmonary edema in a patient with multiple risk factors for coronary artery disease, fibrosis versus pulmonary edema on chest x-ray and CT and borderline aortic stenosis by stroke-volume index. Access: Left femoral artery (the right femoral artery was accessed however due to the proximal iliac stenosis no wire would easily traverse the area. Estimated blood loss: Less than 15 mL Procedures: Left heart catheterization. Selective coronary angiography. Procedure narrative: The patient was brought to the catheterization lab in a fasting condition.  Access site was prepped and draped in standard sterile fashion.  Lidocaine was injected and arterial access was obtained by percutaneous anterior wall puncture technique.  A 6 Canadian arterial sheath was placed in the right femoral artery using a modified Seldinger technique.  As noted above the wire would not traverse the right iliac and therefore a second sheath was placed in the left femoral artery.  Selective coronary arteriography was performed using the Georgina technique with a 6 Canadian 4 curved Georgina right catheter and a 6 Canadian 4 curved Georgina left catheter.  Nonionic contrast was used and was injected manually.  No ventriculogram was performed however left heart pressures were obtained.  Following the procedure the patient's sheaths were Tegaderms in place and will be pulled in recovery.  There were no complications. Contrast: 60 ml Hemodynamic Findings: LV pressure: 200/5/8 mmHg, on pull back at 30 mmHg peak to peak gradient was recorded across the aortic valve. Ao pressure: 170/55 mmHg Left ventriculography: Not performed Angiographic Findings: LMCA: The left main coronary artery gives  rise to LAD and circumflex vessels as well as a ramus intermedius branch.  The left main coronary contains no significant disease. Circumflex: The circumflex coronary artery is nondominant for the posterior circulation and gives rise to a small first obtuse marginal branch large second obtuse marginal branch and 2 small distal obtuse marginal branches.  Only minimal irregularities of less than 20% are seen in the circumflex. Ramus intermedius: The ramus intermedius is a large vessel which courses anterolaterally and contains no significant disease. LAD: LAD gives rise to a moderate first diagonal branch large second diagonal branch several additional small obtuse marginal branches and terminates near the LV apex.  Mild irregularities are seen throughout the LAD with no stenosis greater than 20 to 30%. RCA: The right coronary artery is dominant for the posterior circulation and gives rise to the PDA as well as several posterolateral branches.  The right coronary artery and its branches contain minimal irregularities with a maximal stenosis in the mid RCA estimated at 30%.     CT Chest Hi Resolution Diagnostic  Result Date: 7/2/2025  CT CHEST HI RESOLUTION DIAGNOSTIC Date of Exam: 7/2/2025 3:48 PM EDT Indication: IPF. Comparison: Chest radiograph 7/2/2025. No prior chest CT scan Technique: CT scan of the chest with high-resolution protocol was performed without contrast administration.  Automated exposure control and iterative construction methods were used. Findings: There is advanced bullous change throughout the lungs, severe in the lung apices. There is a small to moderate free-flowing right pleural effusion and minimal left effusion. There is relatively mild discoid atelectasis of the posterior right lower lobe associated with effusion which largely clears on prone imaging.  Scattered areas of finely reticular interstitial change in the lungs are generally associated with bullous disease, and not confined to the  periphery, not particularly suggesting interstitial septal thickening associated with IPF. Appearance is more suggestive of mild active inflammation or residual postinflammatory scarring superimposed on a background of advanced emphysema. Lower lungs suggest there may be a component of mild residual interstitial edema as well. No airspace opacity particularly suspicious for a lobar pneumonia is seen. Images of the mediastinum show shotty normal-sized nodes and no evidence of adenopathy, dominant mass or pericardial effusion. There is dense coronary artery calcification. Images of the upper abdomen show mild fatty liver change, and previous cholecystectomy. Calcification of the left renal hilum image 113 is thought to be a vascular calcification, rather than in the renal collecting system. Bony structures appear grossly intact.     Impression: 1. Small to moderate free-flowing right pleural effusion, associated mild discoid atelectasis, and trace left pleural effusion. Mild diffuse basilar interstitial lung changes that may represent some residual interstitial edema. 2. Advanced changes of emphysema elsewhere, and patchy reticular interstitial lung disease in a nonspecific pattern, much of which may be related to patient's emphysema. Please see above discussion Electronically Signed: Kenny Aguilar MD  7/2/2025 4:20 PM EDT  Workstation ID: [id number]    NM Lung Ventilation Perfusion  Result Date: 7/2/2025  DATE OF EXAM: 7/2/2025 12:19 PM EDT PROCEDURE: NM LUNG VENTILATION PERFUSION INDICATIONS: hypoxemic respiratory failure, GERARD on CKD COMPARISON: Chest radiographs from 7/2/2025 and 7/1/2025 TECHNIQUE: The patient was ventilated with 30.0 mCi of technetium 99m pertechnetate aerosol and ventilation images were acquired in multiple obliquities. The patient then received 5.48 mCi of technetium 99m MAA intravenously and perfusion images were acquired in multiple obliquities. FINDINGS: Perfusion exam is heterogeneous, but  without well-defined segmental, subsegmental, or even smaller peripheral defects. Ventilation portion of the exam is highly irregular, but in a generalized pattern, as may be seen with multifocal airspace disease, severe bullous change, etc. Overall, the pattern appears to reflect diffuse pulmonary parenchymal disease such as pneumonia or pulmonary edema, rather than pulmonary embolic disease.     Asymmetric VQ findings, with much more abnormal diffuse ventilation pattern. Heterogeneous perfusion pattern with no definable defects. Findings are considered low to intermediate probability for pulmonary embolus, but are more likely to represent pneumonia or pulmonary edema. Electronically Signed: Kenny Aguilar MD  7/2/2025 1:39 PM EDT  Workstation ID: [id number]    XR Chest PA & Lateral  Result Date: 7/2/2025  XR CHEST PA AND LATERAL Date of Exam: 7/2/2025 1:04 PM EDT Indication: for VQ scan Comparison: 7/1/2025 chest radiograph Findings: Diffuse interstitial disease seen on yesterday's study is improving, now mostly seen in the lung bases, and right lateral midlung. Findings may reflect resolving interstitial edema. There are very small pleural effusions. No densely consolidated lung or pneumothorax is seen. Heart is enlarged. Vasculature is now upper limits of normal.     Impression: Chest x-ray appearance consistent with improving pulmonary edema. Small remaining pleural effusions. Electronically Signed: Kenny Aguilar MD  7/2/2025 1:28 PM EDT  Workstation ID: [id number]    OUTSIDE NON-INVASIVE CARDIOLOGY STUDY  Result Date: 7/1/2025  This procedure was auto-finalized with no dictation required.    Duplex Carotid Ultrasound CAR  Result Date: 7/1/2025    Right internal carotid artery demonstrates a less than 50% stenosis.   Antegrade right vertebral flow.   Left internal carotid artery demonstrates a less than 50% stenosis.   Antegrade left vertebral flow.     XR Chest 1 View  Result Date: 7/1/2025  XR CHEST 1 VW Date of Exam:  2025 2:21 AM EDT Indication: hypoxia, CHF, ? PNA Comparison: None available. Findings: There are coarse interstitial opacities noted throughout both lungs. There is a small right-sided pleural effusion versus basilar scarring. Heart size is normal. No pneumothorax. No lobar consolidation. No acute osseous abnormality.     Impression: Interstitial disease in the lungs is favored to reflect chronic interstitial disease although atypical pneumonia and interstitial pulmonary edema could have a similar appearance. Stability is not assessed without comparison. There is also a small right-sided pleural effusion. Electronically Signed: Mahesh Novoa MD  2025 2:52 AM EDT  Workstation ID: [id number]    ECHO COMPLETE (DOPPLER / COLOR) W OR WO CONTRAST  Result Date: 2025  TRANSTHORACIC ECHOCARDIOGRAPHY REPORT  Demographics  Patient Name:            ERMA THOMPSON   :     1950  Medical Record Number:   [id number]                Age:     75 year(s)  Corporate ID Number:     [id number]                Gender   Male  Account Number:          [id number]  Sonographer:             Ger TREJO           Height:  71 inches  Referring Physician:                               Weight:  179 pounds  Interpreting Physician:  AILYN MANCINI MD         BMI:     24.97 kg/m^2  Date of Service:         2025  Room Number:             4004 Type of Study:  TTE procedure: ECHO COMPLETE (DOPPLER / COLOR) W OR WO CONTRAST. Measurements Summary:  LVEDd: 5.02 cm        LVESd: 3.63 cm           IVSEd: 1.12 cm  AO Root:2.9 cm        LVPWd: 1.24 cm  Left Ventricle  Peak E-wave: 1.13 m/s Peak A-wave: 1.21 m/s  E/A ratio: 0.93  E' Velocity: 0.06 m/s Volume [id number] ml  Volume mrtoddox49.8 ml  LVOT diameter: 2 cm  Normal sized left ventricle.  Concentric LVH: mild to moderate  Normal EF  Right Ventricle  Diastolic dimension: 3.07 cm  Normal sized right ventricle and function  Left Atrium  LA dimension: 3.8 cm                  LA/Aorta: 1.31  Left atrial enlargement mild  Right Atrium  Normal sized right atrium.  Mitral Valve  Deceleration time: 150 msec           Area PHT: 5 cm^2  P1/2t: 44 msec  MAC  Trivial to mild mitral regurgitration.  Difficult to exclude mild COLT, not definitive by any means  No mitral stenosis.  Aortic Valve  AV VTI: 69.1 cm     Area continuity: 1.34 Peak velocity: 3.03  LVOT VTI: 29.5 cm   cm^2                  m/s  Cusp separation:    Mean velocity: 2.13   Peak gradient: 36.72  0.8 cm              m/s                   mmHg  Mean gradient: 22  mmHg   Severe aortic valve stenosis  Mild aortic regurgitation  Sclerotic AV  Tricuspid Valve  Structurally normal tricuspid valve.  Trivial to mild tricuspid regurgitation  Pulmonic Valve  Grossly normal. Great Vessels Aorta  Aortic Root: 2.9 cm  LVOT Diameter: 2 cm Ao root normal size.  Pericardium / Pleura Trivial pericardial effusion  Other  Agitated saline study demionstrates no right to left shunt  ----------------------------------------------------------------  Electronically signed by AILYN MANCINI MD(Interpreting  Physician) on 2025 18:01  ----------------------------------------------------------------    US renal complete bilateral  Result Date: 2025  RENAL ULTRASOUND HISTORY: Acute kidney injury. COMPARISON: None. PROCEDURE: Ultrasound images of the kidneys were obtained. FINDINGS:  Limited images of the liver parenchyma demonstrate normal echogenicity.   The right kidney measures 10.1 cm in length. It is normal echogenicity. There is no hydronephrosis. The left kidney measures 10.1 cm in length. It is normal echogenicity. There is no hydronephrosis.     Unremarkable renal ultrasound.  : 1950 Images reviewed, interpreted, and dictated by Sonam Nicole MD    XR Chest 1 View  Result Date: 2025  PORTABLE CHEST      2025 5:40 AM HISTORY: Acute shortness of breath. COMPARISON: 2025. FINDINGS: The heart is stable in size.   There has been interval improvement  in the diffuse interstitial opacities throughout the lungs. There are persistent small bilateral pleural effusions. There is no pneumothorax.    There has been interval improvement . Continued follow up recommended. Images reviewed, interpreted, and dictated by Dr. JOSELITO Ortiz. Transcribed by Jared Taylor PA-C    XR Chest 1 View  Result Date: 6/29/2025  PORTABLE CHEST     6/29/2025 10:52 AM HISTORY: Acute shortness of breath. COMPARISON: June 27, 2025. FINDINGS: The heart is stable in size. There has been interval worsening in the perihilar and bibasilar opacities. There are small bilateral pleural effusions. There is no pneumothorax.    Interval worsening as above. Continued follow up recommended. Images reviewed, interpreted, and dictated by Dr. JOSELITO Ortiz. Transcribed by Jared Taylor PA-C      Results for orders placed during the hospital encounter of 06/30/25    Duplex Carotid Ultrasound CAR 07/01/2025  1:26 PM    Interpretation Summary    Right internal carotid artery demonstrates a less than 50% stenosis.    Antegrade right vertebral flow.    Left internal carotid artery demonstrates a less than 50% stenosis.    Antegrade left vertebral flow.    Results for orders placed during the hospital encounter of 06/30/25    Adult Transthoracic Echo Complete W/ Cont if Necessary Per Protocol 07/01/2025  3:03 PM    Interpretation Summary    Left ventricular systolic function is normal. Calculated left ventricular EF = 61.5%    Left ventricular wall thickness is consistent with hypertrophy.    Left ventricular diastolic function was normal.    The left atrial cavity is dilated.    Left atrial volume is mildly increased.    Moderate aortic valve stenosis is present.    Aortic valve maximum pressure gradient is 38 mmHg. Aortic valve mean pressure gradient is 20 mmHg.    Systolic anterior motion of the anterior mitral leaflet is present.    Discharge Details         Discharge Medications        New Medications        Instructions Start Date   hydrALAZINE 25 MG tablet  Commonly known as: APRESOLINE   25 mg, Oral, Every 8 Hours Scheduled, Additional refills per PCP or cardiology      predniSONE 20 MG tablet  Commonly known as: DELTASONE   20 mg, Oral, Daily With Breakfast, 7/10 - 7/14/25   Start Date: July 10, 2025     terazosin 2 MG capsule  Commonly known as: HYTRIN   2 mg, Oral, Nightly, Additional refills per PCP or cardiology             Changes to Medications        Instructions Start Date   carvedilol 12.5 MG tablet  Commonly known as: COREG  What changed:   medication strength  how much to take  additional instructions   12.5 mg, Oral, 2 Times Daily With Meals, Additional refills per PCP or cardiology      glimepiride 1 MG tablet  Commonly known as: AMARYL  What changed: additional instructions   1 mg, Oral, Every Morning Before Breakfast, May take 2 tablets by mouth daily while on Prednisone if blood sugars are high      torsemide 10 MG tablet  Commonly known as: DEMADEX  What changed:   medication strength  See the new instructions.   Take 1 tablet by mouth Daily. May also take 1 tablet Daily As Needed (lower extremity swelling, shortness of breath or weight gain >3lb in 24H or 5lbs in 72H).             Continue These Medications        Instructions Start Date   albuterol (2.5 MG/3ML) 0.083% nebulizer solution  Commonly known as: PROVENTIL   2.5 mg, Nebulization, Every 6 Hours PRN      aspirin 81 MG EC tablet   81 mg, Daily      budesonide-formoterol 160-4.5 MCG/ACT inhaler  Commonly known as: SYMBICORT   2 puffs, Inhalation, 2 Times Daily - RT      dilTIAZem 360 MG 24 hr capsule  Commonly known as: TIAZAC   360 mg, Daily      esomeprazole 40 MG capsule  Commonly known as: nexIUM       ferrous sulfate 325 (65 FE) MG tablet   325 mg, Oral, Daily With Breakfast      pravastatin 20 MG tablet  Commonly known as: PRAVACHOL   20 mg, Oral, Nightly             Stop These  Medications      NIFEdipine XL 30 MG 24 hr tablet  Commonly known as: PROCARDIA XL            Allergies   Allergen Reactions    Sulfa Antibiotics Anaphylaxis    Tuna Oil [Fish Oil] Anaphylaxis     Discharge Disposition:Home or Self Care    Diet:  Diet Instructions       Diet: Regular/House Diet, Cardiac Diets, Diabetic Diets; Healthy Heart (2-3 Na+); Regular (IDDSI 7); Thin (IDDSI 0); Consistent Carbohydrate      Discharge Diet:  Regular/House Diet  Cardiac Diets  Diabetic Diets       Cardiac Diet: Healthy Heart (2-3 Na+)    Texture: Regular (IDDSI 7)    Fluid Consistency: Thin (IDDSI 0)    Diabetic Diet: Consistent Carbohydrate          Activity:  Activity Instructions       Activity as Tolerated               CODE STATUS:    Code Status and Medical Interventions: CPR (Attempt to Resuscitate); Full Support   Ordered at: 07/01/25 0243     Code Status (Patient has no pulse and is not breathing):    CPR (Attempt to Resuscitate)     Medical Interventions (Patient has pulse or is breathing):    Full Support     Level Of Support Discussed With:    Patient     No future appointments.    Additional Instructions for the Follow-ups that You Need to Schedule       Discharge Follow-up with PCP   As directed       Currently Documented PCP:    Aparna Mendoza MD    PCP Phone Number:    [phone]     Follow Up Details: 1 week              Heather Astorga PA-C  07/09/25    Time Spent on Discharge:  I spent 40 minutes on this discharge activity which included: face-to-face encounter with the patient, reviewing the data in the system, coordination of the care with the nursing staff as well as consultants, documentation, and entering orders.

## 2025-07-09 NOTE — PROGRESS NOTES
[facility] Cardiothoracic Surgery In-Patient Progress Note     LOS: 8 days     Chief Complaint: Aortic stenosis    Subjective  No acute overnight events. Resting comfortably in chair. No new complaints. Eager for discharge. He has a dental appointment scheduled tentatively for tomorrow.     Objective  Vital Signs  Temp:  [97.3 °F (36.3 °C)-98.7 °F (37.1 °C)] 98.7 °F (37.1 °C)  Heart Rate:  [53-73] 62  Resp:  [17-18] 17  BP: (132-181)/(39-99) 165/82    Physical Exam:   General Appearance: alert, appears stated age and cooperative   Lungs: Diminished bases bilaterally   Heart: Grade II systolic murmur noted   Ext: No edema  Results     Results from last 7 days   Lab Units 07/08/25  0730   WBC 10*3/mm3 13.71*   HEMOGLOBIN g/dL 9.4*   HEMATOCRIT % 32.1*   PLATELETS 10*3/mm3 238     Results from last 7 days   Lab Units 07/09/25  0638   SODIUM mmol/L 137   POTASSIUM mmol/L 4.9   CHLORIDE mmol/L 112*   CO2 mmol/L 18.0*   BUN mg/dL 93.2*   CREATININE mg/dL 2.27*   GLUCOSE mg/dL 131*   CALCIUM mg/dL 8.1*     Carotid duplex    Right internal carotid artery demonstrates a less than 50% stenosis.    Antegrade right vertebral flow.    Left internal carotid artery demonstrates a less than 50% stenosis.    Antegrade left vertebral flow.    TTE: 7/1/2025    Left ventricular systolic function is normal. Calculated left ventricular EF = 61.5%    Left ventricular wall thickness is consistent with hypertrophy.    Left ventricular diastolic function was normal.    The left atrial cavity is dilated.    Left atrial volume is mildly increased.    Moderate aortic valve stenosis is present.    Aortic valve maximum pressure gradient is 38 mmHg. Aortic valve mean pressure gradient is 20 mmHg.    Systolic anterior motion of the anterior mitral leaflet is present.    Angiographic Findings:      LMCA: The left main coronary artery gives rise to LAD and circumflex vessels as well as a ramus intermedius branch.  The left main coronary contains  no significant disease.     Circumflex: The circumflex coronary artery is nondominant for the posterior circulation and gives rise to a small first obtuse marginal branch large second obtuse marginal branch and 2 small distal obtuse marginal branches.  Only minimal irregularities of less than 20% are seen in the circumflex.     Ramus intermedius: The ramus intermedius is a large vessel which courses anterolaterally and contains no significant disease.     LAD: LAD gives rise to a moderate first diagonal branch large second diagonal branch several additional small obtuse marginal branches and terminates near the LV apex.  Mild irregularities are seen throughout the LAD with no stenosis greater than 20 to 30%.     RCA: The right coronary artery is dominant for the posterior circulation and gives rise to the PDA as well as several posterolateral branches.  The right coronary artery and its branches contain minimal irregularities with a maximal stenosis in the mid RCA estimated at 30%.    Assessment    Acute diastolic heart failure    Anemia    CHF (congestive heart failure)    Chronic kidney disease    COPD (chronic obstructive pulmonary disease)    Diabetes mellitus    Dyslipidemia    Hypertension    Acute respiratory failure with hypoxia    Creatinine 2.27 today    Plan   Cardiac cath 7/7 found mild coronary disease, 70% right iliac stenosis   Patient is needing dental work completed, therefore TAVR not likely to occur this admission  Discharge per primary service at any time per CT surgery concerns     Leah Nolasco PA-C  07/09/25  09:34 EDT

## 2025-07-09 NOTE — OUTREACH NOTE
Prep Survey      Flowsheet Row Responses   [facility] facility patient discharged from? [place]   Is LACE score < 7 ? No   Eligibility Readm Mgmt   Discharge diagnosis Acute diastolic heart failure   Does the patient have one of the following disease processes/diagnoses(primary or secondary)? CHF   Is there a DME ordered? Yes   What DME was ordered? Home O2   Prep survey completed? Yes            Clair PRADO - Registered Nurse

## 2025-07-10 ENCOUNTER — TELEPHONE (OUTPATIENT)
Dept: CARDIOLOGY | Facility: HOSPITAL | Age: 75
End: 2025-07-10
Payer: MEDICARE

## 2025-07-10 DIAGNOSIS — I35.0 SEVERE CALCIFIC AORTIC VALVE STENOSIS: ICD-10-CM

## 2025-07-10 DIAGNOSIS — I35.1 SEVERE AORTIC VALVE REGURGITATION: Primary | ICD-10-CM

## 2025-07-10 NOTE — PROGRESS NOTES
Referral received for severe aortic valve stenosis. Will need a CTA with TAVR protocol in 1-2 weeks due to renal function after LHC.     Called and spoke to spouse about CTA, we will call her with apt information. Patient has a dental apt on 7/15. Will follow up next week.

## 2025-07-16 ENCOUNTER — READMISSION MANAGEMENT (OUTPATIENT)
Dept: CALL CENTER | Facility: HOSPITAL | Age: 75
End: 2025-07-16
Payer: MEDICARE

## 2025-07-16 DIAGNOSIS — I35.0 AORTIC VALVE STENOSIS, ETIOLOGY OF CARDIAC VALVE DISEASE UNSPECIFIED: Primary | ICD-10-CM

## 2025-07-16 NOTE — OUTREACH NOTE
CHF Week 1 Survey      Flowsheet Row Responses   [facility] patient discharged from? [place]   Does the patient have one of the following disease processes/diagnoses(primary or secondary)? CHF   CHF Week 1 attempt successful? Yes   Call start time 0849   Call end time 0853   Discharge diagnosis Acute diastolic heart failure   Person spoke with today (if not patient) and relationship Jo-Ann   Meds reviewed with patient/caregiver? Yes   Is the patient having any side effects they believe may be caused by any medication additions or changes? No   Does the patient have all medications ordered at discharge? Yes   Is the patient taking all medications as directed (includes completed medication regime)? Yes   Comments regarding appointments Urology on 07/22   Does the patient have a primary care provider?  Yes   Does the patient have an appointment with their PCP within 7 days of discharge? Yes   Comments regarding PCP Mendoza tomorrow   Has the patient kept scheduled appointments due by today? Yes   What DME was ordered? Home O2   DME comments patient uses a wheelchair   Pulse Ox monitoring Intermittent   Pulse Ox device source Patient   O2 Sat: education provided Sat levels, Monitoring frequency, When to seek care   Psychosocial issues? No   Did the patient receive a copy of their discharge instructions? Yes   Nursing interventions Reviewed instructions with patient   What is the patient's perception of their health status since discharge? Improving   If the patient is a current smoker, are they able to teach back resources for cessation? Not a smoker   CHF Zone this Call Green Zone   Green Zone Patient reports doing well, No new or worsening shortness of breath   Green Zone Interventions Meds as directed, Follow up visits planned    CHF Week 1 call completed? Yes   Is the patient interested in additional calls from an ambulatory ? No   Would this patient benefit from a Referral to Amb Social Work? No    Wrap up additional comments Patient is doing ok per spouse.  She denies needs.   Call end time [phone]            KWAKU DILLARD - Registered Nurse

## 2025-07-21 ENCOUNTER — TELEPHONE (OUTPATIENT)
Dept: INFUSION THERAPY | Facility: HOSPITAL | Age: 75
End: 2025-07-21
Payer: MEDICARE

## 2025-07-21 RX ORDER — CLINDAMYCIN HYDROCHLORIDE 300 MG/1
600 CAPSULE ORAL ONCE
COMMUNITY
Start: 2025-07-15

## 2025-07-21 NOTE — TELEPHONE ENCOUNTER
Pt contacted as pre-procedure phone call prior to planned CT - TAVR with IVF for 7/23/25. Reviewed with patient's wife arrival time, location, nothing to eat or drink by mouth 2 hours prior to arrival, no caffeine after MN, reviewed procedure instructions and allowed time for questions, and reviewed home medications, allergies, and medical history.

## 2025-07-22 ENCOUNTER — TELEPHONE (OUTPATIENT)
Dept: CARDIAC SURGERY | Facility: CLINIC | Age: 75
End: 2025-07-22
Payer: MEDICARE

## 2025-07-23 ENCOUNTER — HOSPITAL ENCOUNTER (OUTPATIENT)
Dept: CT IMAGING | Facility: HOSPITAL | Age: 75
Discharge: HOME | End: 2025-07-23
Admitting: INTERNAL MEDICINE
Payer: MEDICARE

## 2025-07-23 VITALS
TEMPERATURE: 97.1 F | HEART RATE: 60 BPM | DIASTOLIC BLOOD PRESSURE: 53 MMHG | OXYGEN SATURATION: 98 % | SYSTOLIC BLOOD PRESSURE: 150 MMHG | RESPIRATION RATE: 18 BRPM | HEIGHT: 71 IN | WEIGHT: 180 LBS | BODY MASS INDEX: 25.2 KG/M2

## 2025-07-23 DIAGNOSIS — I35.0 SEVERE CALCIFIC AORTIC VALVE STENOSIS: ICD-10-CM

## 2025-07-23 LAB — GLUCOSE BLDC GLUCOMTR-MCNC: 132 MG/DL (ref 70–130)

## 2025-07-23 PROCEDURE — 71275 CT ANGIOGRAPHY CHEST: CPT

## 2025-07-23 PROCEDURE — 82948 REAGENT STRIP/BLOOD GLUCOSE: CPT

## 2025-07-23 PROCEDURE — 25810000003 SODIUM CHLORIDE 0.9 % SOLUTION: Performed by: INTERNAL MEDICINE

## 2025-07-23 PROCEDURE — 74174 CTA ABD&PLVS W/CONTRAST: CPT

## 2025-07-23 PROCEDURE — 25510000001 IOPAMIDOL PER 1 ML: Performed by: INTERNAL MEDICINE

## 2025-07-23 RX ORDER — IOPAMIDOL 755 MG/ML
100 INJECTION, SOLUTION INTRAVASCULAR
Status: COMPLETED | OUTPATIENT
Start: 2025-07-23 | End: 2025-07-23

## 2025-07-23 RX ORDER — SODIUM CHLORIDE 0.9 % (FLUSH) 0.9 %
10 SYRINGE (ML) INJECTION AS NEEDED
Status: DISCONTINUED | OUTPATIENT
Start: 2025-07-23 | End: 2025-07-24 | Stop reason: HOSPADM

## 2025-07-23 RX ADMIN — IOPAMIDOL 100 ML: 755 INJECTION, SOLUTION INTRAVENOUS at 11:03

## 2025-07-23 RX ADMIN — SODIUM CHLORIDE 1000 ML: 9 INJECTION, SOLUTION INTRAVENOUS at 09:21

## 2025-07-23 NOTE — NURSING NOTE
"Patient in IR today for IVF hydration protocol for renal insufficiency for CTA with iodine contrast today. Patient is to have a TAVR in the upcoming weeks and required this CTA today. Due to his recent renal funciton he is getting our hydration protocol. Due to his hx of CHF, our hydration protocol for CHF patients was utilized. Patient tolerated IVFs and CT scan with contrast well without incident. Encouraged him to go to nearest ER with any increased SOA, increased swelling or chest pain. He is to restart his diuretic tomorrow per his nephrologist. Patient also presented with c/o a \"rash\" to his bilateral groin area and between his buttocks. This rash has the appearance of yeast. The skin is very red and inflammed. Instructed patient to cleanse with mild soap and water, pat dry and apply antifunal powder twice daily and to keep the area as dry as possible. Provided patient with antifungal powder from [facility] RN. Patient verbalized understanding. Also encouraged him to see his PCP if this did not improve. Vitals stable. Patient met DC criteria. IV removed and patient DCd home with family.   "

## 2025-07-25 ENCOUNTER — DOCUMENTATION (OUTPATIENT)
Dept: CARDIOLOGY | Facility: HOSPITAL | Age: 75
End: 2025-07-25
Payer: MEDICARE

## 2025-07-25 ENCOUNTER — TELEPHONE (OUTPATIENT)
Dept: CARDIOLOGY | Facility: CLINIC | Age: 75
End: 2025-07-25
Payer: MEDICARE

## 2025-07-25 NOTE — PROGRESS NOTES
TAVR workup complete. [facility]D reviewing CTA for TAVR access. Unable to do transfemoral TAVR. Possible transcarotid TAVR or will need to refer to another facility for procedure.

## 2025-07-25 NOTE — PROGRESS NOTES
Spoke with spouse about alternate access vs referring out for TAVR. Will follow up after decision per SHD team

## 2025-07-25 NOTE — TELEPHONE ENCOUNTER
Patients family left a voice mail message.  They state they have received a [employer] message saying the CT showed he could not have a TAVR.  They request a return call.  Spoke with patients wife.  CT has been read, but not looked at by [facility] or Dr. Maradiaga.  Patient has inguinal hernia and a femoral approach will not be used.  We will us a carotid approach.  She verbalizes understanding.

## 2025-07-28 ENCOUNTER — READMISSION MANAGEMENT (OUTPATIENT)
Dept: CALL CENTER | Facility: HOSPITAL | Age: 75
End: 2025-07-28
Payer: MEDICARE

## 2025-07-28 NOTE — OUTREACH NOTE
CHF Week 2 Survey      Flowsheet Row Responses   [facility] patient discharged from? [place]   Does the patient have one of the following disease processes/diagnoses(primary or secondary)? CHF   Week 2 attempt successful? No   Unsuccessful attempts Attempt 1   Revoke Vesta KELLY - Registered Nurse

## 2025-07-30 ENCOUNTER — DOCUMENTATION (OUTPATIENT)
Dept: CARDIOLOGY | Facility: HOSPITAL | Age: 75
End: 2025-07-30
Payer: MEDICARE

## 2025-07-30 NOTE — PROGRESS NOTES
Refer out for TAVR per [facility]D team for alternate access. Unable to access for transfemoral TAVR and would likely be a high risk, complex transcarotid or transapical approach. Spoke with patient and spouse, offered to refer to [place], [facility] or [facility]. They will discuss referral options and call me back.     Spouse called back and would like to be referred to [facility]. [facility] if unable to have procedure at [facility] and if not at [facility], would like a referral to [facility] in [place]. Referral sent to [facility], notified [facility]

## 2025-08-01 ENCOUNTER — HOSPITAL ENCOUNTER (OUTPATIENT)
Dept: CARDIOLOGY | Facility: HOSPITAL | Age: 75
Discharge: HOME | End: 2025-08-01
Payer: MEDICARE

## 2025-08-01 VITALS
SYSTOLIC BLOOD PRESSURE: 151 MMHG | BODY MASS INDEX: 25.87 KG/M2 | HEIGHT: 71 IN | HEART RATE: 68 BPM | WEIGHT: 184.8 LBS | OXYGEN SATURATION: 97 % | DIASTOLIC BLOOD PRESSURE: 64 MMHG

## 2025-08-01 DIAGNOSIS — I48.0 PAROXYSMAL ATRIAL FIBRILLATION: ICD-10-CM

## 2025-08-01 DIAGNOSIS — N18.32 CHRONIC KIDNEY DISEASE (CKD) STAGE G3B/A3, MODERATELY DECREASED GLOMERULAR FILTRATION RATE (GFR) BETWEEN 30-44 ML/MIN/1.73 SQUARE METER AND ALBUMINURIA CREATININE RATIO GREATER THAN 300 MG/G (C*: ICD-10-CM

## 2025-08-01 DIAGNOSIS — E11.22 TYPE 2 DIABETES MELLITUS WITH STAGE 3B CHRONIC KIDNEY DISEASE, WITHOUT LONG-TERM CURRENT USE OF INSULIN: ICD-10-CM

## 2025-08-01 DIAGNOSIS — I10 PRIMARY HYPERTENSION: ICD-10-CM

## 2025-08-01 DIAGNOSIS — N18.32 TYPE 2 DIABETES MELLITUS WITH STAGE 3B CHRONIC KIDNEY DISEASE, WITHOUT LONG-TERM CURRENT USE OF INSULIN: ICD-10-CM

## 2025-08-01 DIAGNOSIS — I50.32 CHRONIC HEART FAILURE WITH PRESERVED EJECTION FRACTION (HFPEF): Primary | ICD-10-CM

## 2025-08-01 DIAGNOSIS — I35.0 AORTIC VALVE STENOSIS, ETIOLOGY OF CARDIAC VALVE DISEASE UNSPECIFIED: ICD-10-CM

## 2025-08-01 DIAGNOSIS — E66.3 OVERWEIGHT: ICD-10-CM

## 2025-08-01 PROBLEM — E11.9 DIABETES MELLITUS, TYPE 2: Chronic | Status: ACTIVE | Noted: 2025-02-12

## 2025-08-01 PROBLEM — N18.9 ANEMIA IN CKD (CHRONIC KIDNEY DISEASE): Status: ACTIVE | Noted: 2025-08-01

## 2025-08-01 PROBLEM — D63.1 ANEMIA IN CKD (CHRONIC KIDNEY DISEASE): Status: ACTIVE | Noted: 2025-08-01

## 2025-08-01 LAB
ABSOLUTE LUNG FLUID CONTENT: 30 % (ref 20–35)
ANION GAP SERPL CALCULATED.3IONS-SCNC: 13.5 MMOL/L (ref 5–15)
BUN SERPL-MCNC: 64.3 MG/DL (ref 8–23)
BUN/CREAT SERPL: 36.5 (ref 7–25)
CALCIUM SPEC-SCNC: 9 MG/DL (ref 8.6–10.5)
CHLORIDE SERPL-SCNC: 108 MMOL/L (ref 98–107)
CO2 SERPL-SCNC: 22.5 MMOL/L (ref 22–29)
CREAT SERPL-MCNC: 1.76 MG/DL (ref 0.76–1.27)
EGFRCR SERPLBLD CKD-EPI 2021: 40.1 ML/MIN/1.73
GLUCOSE SERPL-MCNC: 165 MG/DL (ref 65–99)
MAGNESIUM SERPL-MCNC: 1.9 MG/DL (ref 1.6–2.4)
NT-PROBNP SERPL-MCNC: 2028 PG/ML (ref 0–900)
POTASSIUM SERPL-SCNC: 4.3 MMOL/L (ref 3.5–5.2)
SODIUM SERPL-SCNC: 144 MMOL/L (ref 136–145)

## 2025-08-01 PROCEDURE — 36415 COLL VENOUS BLD VENIPUNCTURE: CPT | Performed by: PHYSICIAN ASSISTANT

## 2025-08-01 PROCEDURE — 83880 ASSAY OF NATRIURETIC PEPTIDE: CPT | Performed by: PHYSICIAN ASSISTANT

## 2025-08-01 PROCEDURE — 83735 ASSAY OF MAGNESIUM: CPT | Performed by: PHYSICIAN ASSISTANT

## 2025-08-01 PROCEDURE — 94726 PLETHYSMOGRAPHY LUNG VOLUMES: CPT | Performed by: PHYSICIAN ASSISTANT

## 2025-08-01 PROCEDURE — 80048 BASIC METABOLIC PNL TOTAL CA: CPT | Performed by: PHYSICIAN ASSISTANT

## 2025-08-01 RX ORDER — HYDRALAZINE HYDROCHLORIDE 50 MG/1
50 TABLET, FILM COATED ORAL EVERY 8 HOURS SCHEDULED
Qty: 90 TABLET | Refills: 3 | Status: SHIPPED | OUTPATIENT
Start: 2025-08-01 | End: 2025-10-30

## 2025-08-01 NOTE — PROGRESS NOTES
[facility]  ERICA Vera, Heather CORBETT PA-C  [facility] [facility] [address] [place] [id number]    Thank you for asking me to see Baldemar Anderson for congestive heart failure.    HPI:     This is a 75 y.o. male with known past medical history of:    Chronic HFpEF  TTE from 07/01/25 with EF 61.5%.  Moderate aortic stenosis.  AV max pressure graident 38mmHg.  AV mean pressure gradient 20mmHg.    Moderate aortic stenosis  Pending TAVR work/up. Reviewed  [facility] DC summary from July 9, 2025.  Needs Dental work-up prior to procedure.    Per 07/30/25 review of chart,  due to inguinal hernia  of groin making femoral approach difficult, would likely be high risk,  and require complex transcarotid or transapical approach with offer of reveral to [place], [facility] or [facility].   Spouse called back voicing referral [facility] as preference  Mild nonobstructive CAD  [facility] from 07/02/25 with Mild CAD. 70% proximal right iliac stenosis.   PAF  New onset in Feb 2025  Anticoagulation stopped at that time due to concern for GI bleeding.  Further GI work-up recommended by his primary cardiologist prior to restarting Eliquis.    Hyperlipidemia  COPD with prior tobacco abuse  Findings on CT Angio TAVR chest/abdomen from 07/23/24 with findings concerning for pulmonary fibrosis  CKD III 3b/a3  DM type 2  Chronic anemia in CKD  Proteinuria    Baldemar Anderson presents for today for heart failure evaluation.  The patient is typically seen by Aparna Mendoza MD.  Patient's primary cardiologist is Dr. Adelia Isabel.     Last known hospitalization and/or ED visit: July 2025 hospitalization @  [facility] from 06/30/25 through 07/09/25 after transfer from [facility] to tertiary care center for TAVR evaluation given AS.  Weaned from HFNC to 1L NC-RA prior to DC.            08/01/2025 visit data/details regarding:   Dyspnea: Dyspnea with prolonged exertion  Lower  extremity swelling: Bilateral lower extremity swelling  Abdominal swelling: Denies  Home weight: Weight monitoring booklet provided during initial visit; Has scale.   Home BP: BP monitoring booklet provided during initial visit; Has BP cuff.   Home heart rate: HR monitoring booklet provided during initial visit  Daily activities of living: Performing with assistance from wife & daughter  Pillows/lying flat: Recliner   HF zone: Yellow  Accompanied by: Wife & daughter  Mr. Anderson reports dyspnea on exertion. He feels swelling of his bilateral lower extremities has worsened since his hospital discharge.  He reports he has also had some bruising on his side and back.   He and his wife report upcoming appointment @ [facility] for further evaluation for TAVR. He was following with [facility] valve clinic.           Review of Systems - Review of Systems   Constitutional: Negative for decreased appetite and fever.   HENT:  Negative for congestion and ear pain.    Cardiovascular:  Positive for dyspnea on exertion and leg swelling.   Respiratory:  Positive for shortness of breath.    Musculoskeletal:  Positive for arthritis.   Gastrointestinal:  Negative for bloating.         All other systems were reviewed and were negative.    Patient Active Problem List   Diagnosis    Anemia    CHF (congestive heart failure)    Chronic kidney disease    COPD (chronic obstructive pulmonary disease)    Diabetes mellitus    Dyslipidemia    Hypertension    Acute diastolic heart failure    Acute respiratory failure with hypoxia    Aortic valve stenosis    Chronic diastolic CHF (congestive heart failure)    Anemia in CKD (chronic kidney disease)    Aortic stenosis    Chronic kidney disease (CKD) stage G3b/A3, moderately decreased glomerular filtration rate (GFR) between 30-44 mL/min/1.73 square meter and albuminuria creatinine ratio greater than 300 mg/g (C*    Type 2 diabetes mellitus with chronic kidney disease    Diabetes mellitus, type 2        family history includes Heart failure in his mother; Pneumonia in his father.     reports that he has quit smoking. His smoking use included cigarettes. He has never used smokeless tobacco. He reports that he does not drink alcohol and does not use drugs.    Allergies   Allergen Reactions    Sulfa Antibiotics Anaphylaxis    Tuna Oil [Fish Oil] Anaphylaxis         Current Outpatient Medications:     albuterol (PROVENTIL) (2.5 MG/3ML) 0.083% nebulizer solution, Take 2.5 mg by nebulization Every 6 (Six) Hours As Needed for Wheezing., Disp: , Rfl:     aspirin 81 MG EC tablet, Take 1 tablet by mouth Daily., Disp: , Rfl:     budesonide-formoterol (SYMBICORT) 160-4.5 MCG/ACT inhaler, Inhale 2 puffs 2 (Two) Times a Day., Disp: , Rfl:     carvedilol (COREG) 12.5 MG tablet, Take 1 tablet by mouth 2 (Two) Times a Day With Meals. Additional refills per PCP or cardiology, Disp: 60 tablet, Rfl: 2    dilTIAZem (TIAZAC) 360 MG 24 hr capsule, Take 1 capsule by mouth Daily., Disp: , Rfl:     glimepiride (AMARYL) 1 MG tablet, Take 1 tablet by mouth Every Morning Before Breakfast. May take 2 tablets by mouth daily while on Prednisone if blood sugars are high, Disp: , Rfl:     hydrALAZINE (APRESOLINE) 50 MG tablet, Take 1 tablet by mouth Every 8 (Eight) Hours for 90 days. Additional refills per PCP or cardiology, Disp: 90 tablet, Rfl: 3    pravastatin (PRAVACHOL) 20 MG tablet, Take 1 tablet by mouth Every Night., Disp: , Rfl:     torsemide (DEMADEX) 10 MG tablet, Take 1 tablet by mouth Daily. May also take 1 tablet Daily As Needed (lower extremity swelling, shortness of breath or weight gain >3lb in 24H or 5lbs in 72H)., Disp: 60 tablet, Rfl: 2      Physical Exam:  I have reviewed the patient's current vital signs as documented in the patient's EMR.   Vitals:    08/01/25 1405   BP: 151/64   Pulse: 68   SpO2: 97%     Body mass index is 25.77 kg/m².       08/01/25  1405   Weight: 83.8 kg (184 lb 12.8 oz)      Physical Exam  Vitals  and nursing note reviewed.   Constitutional:       General: He is awake.      Appearance: Normal appearance. He is well-developed and normal weight.      Interventions: Nasal cannula in place.   HENT:      Head: Normocephalic and atraumatic.   Eyes:      General: Lids are normal.      Conjunctiva/sclera:      Right eye: Right conjunctiva is not injected.      Left eye: Left conjunctiva is not injected.   Cardiovascular:      Rate and Rhythm: Normal rate and regular rhythm.      Heart sounds: Murmur heard.      Comments: 2/6 systolic ejection murmur  Pulmonary:      Effort: No tachypnea or bradypnea.      Breath sounds: No decreased breath sounds, wheezing, rhonchi or rales.   Abdominal:      General: Bowel sounds are normal.   Musculoskeletal:      Right lower le+ Edema present.      Left lower le+ Edema present.   Skin:     General: Skin is warm and dry.      Comments: Healing rash on back.   Slight ecchymosis on the left lateral portion of his abdomen that appears to be healing   Neurological:      Mental Status: He is alert and oriented to person, place, and time.   Psychiatric:         Attention and Perception: Attention normal.         Mood and Affect: Mood normal.         Behavior: Behavior is cooperative.          JVP: Volume/Pulsation: Normal.        DATA REVIEWED:     EKG:           EKG: normal EKG, normal sinus rhythm, unchanged from previous tracings.   ---------------------------------------------------  TTE/DEBO:  Results for orders placed during the hospital encounter of 25    Adult Transthoracic Echo Complete W/ Cont if Necessary Per Protocol    Interpretation Summary    Left ventricular systolic function is normal. Calculated left ventricular EF = 61.5%    Left ventricular wall thickness is consistent with hypertrophy.    Left ventricular diastolic function was normal.    The left atrial cavity is dilated.    Left atrial volume is mildly increased.    Moderate aortic valve stenosis is  present.    Aortic valve maximum pressure gradient is 38 mmHg. Aortic valve mean pressure gradient is 20 mmHg.    Systolic anterior motion of the anterior mitral leaflet is present.        LAST HEART CATH RESULT/IF AVAILABLE:     Results for orders placed during the hospital encounter of 06/30/25    Cardiac Catheterization/Vascular Study    Conclusion  FINAL    Impression  Mild coronary artery disease  70% proximal right iliac stenosis    RECOMMENDATIONS:  Further evaluation for pulmonary fibrosis versus significant aortic stenosis will be undertaken now that we know the patient has no significant coronary disease.    Indications: Acute pulmonary edema in a patient with multiple risk factors for coronary artery disease, fibrosis versus pulmonary edema on chest x-ray and CT and borderline aortic stenosis by stroke-volume index.    Access: Left femoral artery (the right femoral artery was accessed however due to the proximal iliac stenosis no wire would easily traverse the area.    Estimated blood loss: Less than 15 mL      Procedures:  Left heart catheterization.  Selective coronary angiography.      Procedure narrative:  The patient was brought to the catheterization lab in a fasting condition.  Access site was prepped and draped in standard sterile fashion.  Lidocaine was injected and arterial access was obtained by percutaneous anterior wall puncture technique.  A 6 Slovenian arterial sheath was placed in the right femoral artery using a modified Seldinger technique.  As noted above the wire would not traverse the right iliac and therefore a second sheath was placed in the left femoral artery.  Selective coronary arteriography was performed using the Georgina technique with a 6 Slovenian 4 curved Georgina right catheter and a 6 Slovenian 4 curved Georgina left catheter.  Nonionic contrast was used and was injected manually.  No ventriculogram was performed however left heart pressures were obtained.  Following the procedure  the patient's sheaths were Tegaderms in place and will be pulled in recovery.  There were no complications.    Contrast: 60 ml    Hemodynamic Findings:    LV pressure: 200/5/8 mmHg, on pull back at 30 mmHg peak to peak gradient was recorded across the aortic valve.  Ao pressure: 170/55 mmHg    Left ventriculography: Not performed    Angiographic Findings:    LMCA: The left main coronary artery gives rise to LAD and circumflex vessels as well as a ramus intermedius branch.  The left main coronary contains no significant disease.    Circumflex: The circumflex coronary artery is nondominant for the posterior circulation and gives rise to a small first obtuse marginal branch large second obtuse marginal branch and 2 small distal obtuse marginal branches.  Only minimal irregularities of less than 20% are seen in the circumflex.    Ramus intermedius: The ramus intermedius is a large vessel which courses anterolaterally and contains no significant disease.    LAD: LAD gives rise to a moderate first diagonal branch large second diagonal branch several additional small obtuse marginal branches and terminates near the LV apex.  Mild irregularities are seen throughout the LAD with no stenosis greater than 20 to 30%.    RCA: The right coronary artery is dominant for the posterior circulation and gives rise to the PDA as well as several posterolateral branches.  The right coronary artery and its branches contain minimal irregularities with a maximal stenosis in the mid RCA estimated at 30%.      -----------------------------------------------------  CXR/Imaging:   Imaging Results (Most Recent)       None            I personally reviewed and interpreted the CXR.      -----------------------------------------------------  CT:   CT Angio TAVR Chest Abdomen Pelvis  Result Date: 7/23/2025  1. CTA of the chest, abdomen and pelvis, with image data saved per TAVR protocol. 2. Dense calcification of the aortic valve leaflets. No evidence  of thoracic aortic aneurysm or dissection. No evidence of significant aortoiliac luminal stenosis. 3. 6 x 4 cm left inguinal collection with appearance favoring subacute hematoma. 4. Additional chronic findings are present including advanced emphysema and probable component of pulmonary fibrosis. Electronically Signed: Chetan Lovett MD  7/23/2025 12:23 PM EDT  Workstation ID: [id number]    Cardiac Catheterization/Vascular Study  Result Date: 7/7/2025  Mild coronary artery disease 70% proximal right iliac stenosis RECOMMENDATIONS: Further evaluation for pulmonary fibrosis versus significant aortic stenosis will be undertaken now that we know the patient has no significant coronary disease. Indications: Acute pulmonary edema in a patient with multiple risk factors for coronary artery disease, fibrosis versus pulmonary edema on chest x-ray and CT and borderline aortic stenosis by stroke-volume index. Access: Left femoral artery (the right femoral artery was accessed however due to the proximal iliac stenosis no wire would easily traverse the area. Estimated blood loss: Less than 15 mL Procedures: Left heart catheterization. Selective coronary angiography. Procedure narrative: The patient was brought to the catheterization lab in a fasting condition.  Access site was prepped and draped in standard sterile fashion.  Lidocaine was injected and arterial access was obtained by percutaneous anterior wall puncture technique.  A 6 Turkmen arterial sheath was placed in the right femoral artery using a modified Seldinger technique.  As noted above the wire would not traverse the right iliac and therefore a second sheath was placed in the left femoral artery.  Selective coronary arteriography was performed using the Georgina technique with a 6 Turkmen 4 curved Georgina right catheter and a 6 Turkmen 4 curved Georgina left catheter.  Nonionic contrast was used and was injected manually.  No ventriculogram was performed however left heart  pressures were obtained.  Following the procedure the patient's sheaths were Tegaderms in place and will be pulled in recovery.  There were no complications. Contrast: 60 ml Hemodynamic Findings: LV pressure: 200/5/8 mmHg, on pull back at 30 mmHg peak to peak gradient was recorded across the aortic valve. Ao pressure: 170/55 mmHg Left ventriculography: Not performed Angiographic Findings: LMCA: The left main coronary artery gives rise to LAD and circumflex vessels as well as a ramus intermedius branch.  The left main coronary contains no significant disease. Circumflex: The circumflex coronary artery is nondominant for the posterior circulation and gives rise to a small first obtuse marginal branch large second obtuse marginal branch and 2 small distal obtuse marginal branches.  Only minimal irregularities of less than 20% are seen in the circumflex. Ramus intermedius: The ramus intermedius is a large vessel which courses anterolaterally and contains no significant disease. LAD: LAD gives rise to a moderate first diagonal branch large second diagonal branch several additional small obtuse marginal branches and terminates near the LV apex.  Mild irregularities are seen throughout the LAD with no stenosis greater than 20 to 30%. RCA: The right coronary artery is dominant for the posterior circulation and gives rise to the PDA as well as several posterolateral branches.  The right coronary artery and its branches contain minimal irregularities with a maximal stenosis in the mid RCA estimated at 30%.     CT Chest Hi Resolution Diagnostic  Result Date: 7/2/2025  Impression: 1. Small to moderate free-flowing right pleural effusion, associated mild discoid atelectasis, and trace left pleural effusion. Mild diffuse basilar interstitial lung changes that may represent some residual interstitial edema. 2. Advanced changes of emphysema elsewhere, and patchy reticular interstitial lung disease in a nonspecific pattern, much of  "which may be related to patient's emphysema. Please see above discussion Electronically Signed: Kenny Aguilar MD  7/2/2025 4:20 PM EDT  Workstation ID: [id number]    NM Lung Ventilation Perfusion  Result Date: 7/2/2025  Asymmetric VQ findings, with much more abnormal diffuse ventilation pattern. Heterogeneous perfusion pattern with no definable defects. Findings are considered low to intermediate probability for pulmonary embolus, but are more likely to represent pneumonia or pulmonary edema. Electronically Signed: Kenny Aguilar MD  7/2/2025 1:39 PM EDT  Workstation ID: [id number]    XR Chest PA & Lateral  Result Date: 7/2/2025  Impression: Chest x-ray appearance consistent with improving pulmonary edema. Small remaining pleural effusions. Electronically Signed: Kenny Aguilar MD  7/2/2025 1:28 PM EDT  Workstation ID: [id number]    I personally reviewed the images of the CT scan.  My personal interpretation is below.      ----------------------------------------------------      --------------------------------------------------------------------------------------------------    Laboratory evaluations:    Lab Results   Component Value Date    GLUCOSE 165 (H) 08/01/2025    BUN 64.3 (H) 08/01/2025    CREATININE 1.76 (H) 08/01/2025    BCR 36.5 (H) 08/01/2025    K 4.3 08/01/2025    CO2 22.5 08/01/2025    CALCIUM 9.0 08/01/2025    ALBUMIN 2.8 (L) 07/01/2025    AST 21 07/01/2025    ALT 37 07/01/2025     Lab Results   Component Value Date    WBC 13.71 (H) 07/08/2025    HGB 9.4 (L) 07/08/2025    HCT 32.1 (L) 07/08/2025    MCV 89.9 07/08/2025     07/08/2025     No results found for: \"CHOL\", \"CHLPL\", \"TRIG\", \"HDL\", \"LDL\", \"LDLDIRECT\"  Lab Results   Component Value Date    TSH 0.630 07/01/2025     Lab Results   Component Value Date    HGBA1C 7.33 (H) 07/01/2025     Lab Results   Component Value Date    ALT 37 07/01/2025     Lab Results   Component Value Date    HGBA1C 7.33 (H) 07/01/2025     Lab Results   Component Value Date    " CREATININE 1.76 (H) 08/01/2025     Lab Results   Component Value Date    IRON 116 07/06/2025    TIBC 323 07/06/2025    FERRITIN 356.00 07/06/2025     Lab Results   Component Value Date    INR 1.16 (H) 07/01/2025    INR 1.06 06/27/2025    INR 1.04 06/21/2025    PROTIME 15.5 (H) 07/01/2025    PROTIME 10.8 06/27/2025    PROTIME 10.9 06/21/2025        Lab Results   Component Value Date    ABSOLUTELUNG 30 08/01/2025           1. Chronic heart failure with preserved ejection fraction (HFpEF)    2. Chronic kidney disease (CKD) stage G3b/A3, moderately decreased glomerular filtration rate (GFR) between 30-44 mL/min/1.73 square meter and albuminuria creatinine ratio greater than 300 mg/g (C*    3. Primary hypertension    4. Aortic valve stenosis, etiology of cardiac valve disease unspecified    5. Paroxysmal atrial fibrillation    6. Type 2 diabetes mellitus with stage 3b chronic kidney disease, without long-term current use of insulin    7. Overweight          ORDERS PLACED TODAY:  Orders Placed This Encounter   Procedures    ReDs Vest    Basic Metabolic Panel    Magnesium    proBNP    Basic Metabolic Panel    Magnesium    proBNP        Diagnoses and all orders for this visit:    1. Chronic heart failure with preserved ejection fraction (HFpEF) (Primary)  -     Basic Metabolic Panel; Future  -     Magnesium; Future  -     proBNP; Future  -     Basic Metabolic Panel; Standing  -     Basic Metabolic Panel  -     Magnesium; Standing  -     Magnesium  -     proBNP; Standing  -     proBNP  -     ReDs Vest    2. Chronic kidney disease (CKD) stage G3b/A3, moderately decreased glomerular filtration rate (GFR) between 30-44 mL/min/1.73 square meter and albuminuria creatinine ratio greater than 300 mg/g (C*    3. Primary hypertension    4. Aortic valve stenosis, etiology of cardiac valve disease unspecified    5. Paroxysmal atrial fibrillation    6. Type 2 diabetes mellitus with stage 3b chronic kidney disease, without long-term  current use of insulin    7. Overweight    Other orders  -     hydrALAZINE (APRESOLINE) 50 MG tablet; Take 1 tablet by mouth Every 8 (Eight) Hours for 90 days. Additional refills per PCP or cardiology  Dispense: 90 tablet; Refill: 3             MEDS ORDERED TODAY:    New Medications Ordered This Visit   Medications    hydrALAZINE (APRESOLINE) 50 MG tablet     Sig: Take 1 tablet by mouth Every 8 (Eight) Hours for 90 days. Additional refills per PCP or cardiology     Dispense:  90 tablet     Refill:  3        ---------------------------------------------------------------------------------------------------------------------------          ASSESSMENT/PLAN:      Diagnosis Plan   1. Chronic heart failure with preserved ejection fraction (HFpEF)  Basic Metabolic Panel    Magnesium    proBNP    Basic Metabolic Panel    Basic Metabolic Panel    Magnesium    Magnesium    proBNP    proBNP    ReDs Vest      2. Chronic kidney disease (CKD) stage G3b/A3, moderately decreased glomerular filtration rate (GFR) between 30-44 mL/min/1.73 square meter and albuminuria creatinine ratio greater than 300 mg/g (C*        3. Primary hypertension        4. Aortic valve stenosis, etiology of cardiac valve disease unspecified        5. Paroxysmal atrial fibrillation        6. Type 2 diabetes mellitus with stage 3b chronic kidney disease, without long-term current use of insulin        7. Overweight            not acutely decompensated chronic diastolic heart failure. CHF.     NYHA stage Stage C: Structural heart disease is present AND symptoms have occurredFC-Class III: Marked limitation of physical activity. Comfortable at rest. Less than ordinary activity causes fatigue, palpitation, or dyspnea.     Today, Patient is approaching euvolemiaand with  Moderate perfusion. The patient's hemodynamics are currently unacceptable. HR is: normal and is at goal. BP/MAP was reviewed and there isroom for medication up-titration.  Clinical trajectory was  assessed and haswaxed and waned.     CHF GOAL DIRECTED MEDICAL THERAPY FOR PATIENT ADDRESSED/ADJUSTED:     GDMT: HFpEF    Drug Class   Drug   Dose Last Dose Adjustment Notes   ACEi/ARB/ARNI/alt       Beta Blocker Coreg 12.5mg BID     MRA       SGLT2i Consider next visit   N/A   Secondaries if applicable:          -CHF Specific BB:    Coreg 12.5mg BID on board.    We discussed processes/benefits of HF clinic including nursing, pharmacist, and provider evaluation during each visit with ability for in office ReDS vest, labs, and ability to provide IV diuresis in the clinic with close outpatient monitoring.  Additionally, patient was educated about the availability of delivery of medications to patient's clinic room prior to leaving the building which assists with medication compliance and insures medications are in hands when changes are made (if patient opts for apothecary usage) with thorough guidance regarding changes and medication schedule provided.          -ACE/ARB/ARNi/alt:   Recent GERARD on CKD.   Continue hydralazine with increase.      -MRA:   CKD 3b.  Hold for now.      -SGLT2 inhibitor therapy:   CKD 3b.  Consider possibly in future visit if renal fxn continues to improve.      -Diuretic regimen:   ReDS Vest reading for. 08/01/25 is  30; ReDs Vest reading reviewed with patient.    Continue Torsemide per Nephro dosing. 10mg torsemide a day with an extra 10mg on days when he weighs more than 180.    BMP, Mag, & ProBNP obtained &  reviewed with patient.  eGFR has improved from prior values.  ProBNP is slightly increased from earlier July dc values but remain lower than admission values. See below regarding AS    -Fluid restriction/Sodium restriction:   Requested 2000 ml restriction  Patient has been asked to weigh daily and was provided with a printed diuretic strategy.  1,500 mg Na restriction was discussed.        -Acute and/or Chronic underlying conditions other than HF addressed during visit:   Moderate AV  stenosis:   Previously following with  [facility] valve clinic.  Per chart perusal has now been referred to [facility] for further evaluation as he will likely need transcarotid or transapical approach for TAVR due to findings of inguinal hernia during evaluation for TAVR procedure.     Paroxysmal atrial fibrillation:   Per review of [facility] notes and prior [facility] hospitalization, patient was previously on Eliquis, this was then stopped for GI bleed.  Further GI work-up was recommended prior to restarting Eliquis but this has been postponed at present by patient due to currently pending TAVR work up.     Essential HTN:   Stop Terazosin as this is known to exacerbate HF.  Would not have on board unless there was a compelling indication such as symptomatic BPH.    Increase Hydralazine to 50mg TID to optimize BP control as this will likely help with compensating heart failure.     DM type 2 with CKD 3b:   CKD 3b:   Continue Nephro f/u.   eGFR today appears stable and better than prior values over the past few months per chart review.  Will forward to PCP & Nephro.      Identifiable barriers to Heart Failure Self-care:   Medical Barriers: Frailty  Social Barriers: Patient requires many specialists and lives quite a distance from the available specialities.            BMI is >= 25 and <30. (Overweight) The following options were offered after discussion;: weight loss educational material (shared in after visit summary) and Heart Failure dietary measures        Barriers to GDMT/titration:  Hemodynamic limitations (HR, BP), Renal function limitations  Goal for next visit: Euvolemia and optimized blood pressure control  RTC: 6-8 weeks  Criteria that would warrant earlier follow-up:  Worsening swelling, worsening shortness of breath, sudden/rapid weight gain    Lab monitoring requirements: Repeat on next visit unless performed within close proximity  Patient education provided: AVS & Intro to HF booklet  Self-monitoring  instructions:  Daily weights, daily blood pressure monitoring.                   This document has been electronically signed by Carlie Bowie PA-C  August 1, 2025 17:01 EDT      Dictated Utilizing Dragon Dictation: Part of this note may be an electronic transcription/translation of spoken language to printed text using the Dragon Dictation System.    Follow-up appointment and medication changes provided in hand delivered After Visit Summary as well as reviewed in the room.      >40 minutes out of 65 minutes face to face spent counseling patient extensively on dietary Na+ intake, importance of activity, weight monitoring, compliance with medications in addition to importance of titration with goal directed medical therapy and follow up appointments. Time was also spent preparing for the visit, reviewing tests, performing a medically appropriate examination and/or evaluation, counseling and educating the patient/family/caregiver, ordering medications, tests, or procedures, referring and communicating with other health care professionals, documenting information in the medical record, independently interpreting results and communicating that information with the patient/family/caregiver, and care coordination

## 2025-08-01 NOTE — PROGRESS NOTES
Heart Failure Clinic    Date: 08/01/25     Vitals:    08/01/25 1405   BP: 151/64   Pulse: 68   SpO2: 97%    Weight 184.8    Method of arrival: Other wheelchair O2@2LPM     Weighing self daily: Yes    Monitoring Heart Failure Zones: No    Today's HF Zone: Reviewed Zones    Taking medications as prescribed: Yes    Edema Yes    Shortness of Air: Yes    Number of pillows used at night:Recliner    Educational Materials given:  avs                                                                         ReDS Value:30   25-35 Optimal Value Status      Madison Rivera RN 08/01/25 14:07 EDT

## 2025-08-01 NOTE — PATIENT INSTRUCTIONS
Heart Failure Action Plan  A heart failure action plan helps you know what to do when you have symptoms of heart failure. Your action plan is a color-coded plan that lists the symptoms to watch for and indicates what actions to take.  If you have symptoms in the green zone, you're doing well.  If you have symptoms in the yellow zone, you're having problems.  If you have symptoms in the red zone, you need medical care right away.  Follow the plan that was created by you and your health care provider. Review your plan each time you visit your provider.  Green zone  These signs mean you're doing well and can continue what you're doing:  You don't have new or worsening shortness of breath.  You have very little swelling or no new swelling.  Your weight is stable (no gain or loss).  You have a normal activity level.  You don't have chest pain or any other new symptoms.  Yellow zone  These signs and symptoms mean your condition may be getting worse and you should make some changes:  You have trouble breathing when you're active.  You have swelling in your feet or legs or have discomfort in your belly.  You gain 2-3 lb (0.9-1.4 kg) in 24 hours, or 5 lb (2.3 kg) in a week. This amount may be more or less depending on your condition.  You get tired easily.  You have trouble sleeping.  You have a dry cough.  If you have any of these symptoms:  Contact your provider within the next day.  Your provider may adjust your medicines.  Red zone  These signs and symptoms mean you should get medical help right away:  You have trouble breathing when resting or cannot lie flat and you need to raise your head to help you breathe.  You have a dry cough that's getting worse.  You have swelling or pain in your feet or legs or discomfort in your belly that's getting worse.  You suddenly gain more than 2-3 lb (0.9-1.4 kg) in 24 hours, or more than 5 lb (2.3 kg) in a week. This amount may be more or less depending on your condition.  You have  trouble staying awake or you feel confused.  You don't have an appetite.  You have worsening sadness or depression.  These symptoms may be an emergency. Call 911 right away.  Do not wait to see if the symptoms will go away.  Do not drive yourself to the hospital.  Follow these instructions at home:  Take medicines only as told.  Eat a heart-healthy diet. Work with a dietitian to create an eating plan that's best for you.  Weigh yourself each day.   Call your provider if you gain more than 3 lb in 24 hours, or more than 5 lb in a week.  Health care provider name: Carlie [facility] care provider phone number: [phone]

## 2025-08-01 NOTE — PROGRESS NOTES
Heart Failure Clinic  Pharmacist Note     Baldemar Anderson is a 75 y.o. male seen in the Heart Failure Clinic for HFpEF with most recent EF of 61% on 7/1/25. Patient has recently presented to the ED on 6/27/25 and was hospitalized from 6/29-7/9/25 for acute respiratory failure secondary to CHF, GERARD, COPD.He was discharged home on hydralazine, prednisone, terazosin and dose of coreg, glimepiride and torsemide were changed. Nifedipine was stopped.    Baldemar Anderson reports a fair understanding of medications and was accompanied by his wife and daughter today in clinic. His daughter checks his BP, weighs him and gives him his medications every morning. He reports that his BP's over the past week or longer have been running higher than normal and it came on all of the sudden. He reports that it has been in the 170's/60's at home. He verifies adherence to his medications and does not miss a dose. He was recently seen by Dr. Rodriguez and he continued the lower dose of Torsemide from discharge- 10mg daily. He reports that he has taken 20mg on two days this past week due to swelling. He reports swelling in his feet/ankles today and SOB with exertion.        Medication Use:   Hx of med intolerances:  None related to HF/CV   Retail Rx Management: [facility]    Past Medical History:   Diagnosis Date    Anemia     CHF (congestive heart failure)     Chronic kidney disease     COPD (chronic obstructive pulmonary disease)     Diabetes mellitus     Dyslipidemia     Hypertension      ALLERGIES: Sulfa antibiotics and Tuna oil [fish oil]  Current Outpatient Medications   Medication Sig Dispense Refill    albuterol (PROVENTIL) (2.5 MG/3ML) 0.083% nebulizer solution Take 2.5 mg by nebulization Every 6 (Six) Hours As Needed for Wheezing.      aspirin 81 MG EC tablet Take 1 tablet by mouth Daily.      budesonide-formoterol (SYMBICORT) 160-4.5 MCG/ACT inhaler Inhale 2 puffs 2 (Two) Times a Day.      carvedilol (COREG) 12.5 MG tablet Take 1  "tablet by mouth 2 (Two) Times a Day With Meals. Additional refills per PCP or cardiology 60 tablet 2    dilTIAZem (TIAZAC) 360 MG 24 hr capsule Take 1 capsule by mouth Daily.      glimepiride (AMARYL) 1 MG tablet Take 1 tablet by mouth Every Morning Before Breakfast. May take 2 tablets by mouth daily while on Prednisone if blood sugars are high      hydrALAZINE (APRESOLINE) 50 MG tablet Take 1 tablet by mouth Every 8 (Eight) Hours for 90 days. Additional refills per PCP or cardiology 90 tablet 3    pravastatin (PRAVACHOL) 20 MG tablet Take 1 tablet by mouth Every Night.      torsemide (DEMADEX) 10 MG tablet Take 1 tablet by mouth Daily. May also take 1 tablet Daily As Needed (lower extremity swelling, shortness of breath or weight gain >3lb in 24H or 5lbs in 72H). 60 tablet 2     No current facility-administered medications for this encounter.       Vaccination History:   Pneumonia: UTD  Annual Influenza: PCV13 2016; PPVS23 2017  Shingles: Zostavax UTD    Objective  Vitals:    08/01/25 1405   BP: 151/64   BP Location: Left arm   Patient Position: Sitting   Cuff Size: Adult   Pulse: 68   SpO2: 97%   Weight: 83.8 kg (184 lb 12.8 oz)   Height: 180.3 cm (71\")     Wt Readings from Last 3 Encounters:   08/01/25 83.8 kg (184 lb 12.8 oz)   07/23/25 81.6 kg (180 lb)   07/09/25 82.6 kg (182 lb 1.6 oz)         08/01/25  1405   Weight: 83.8 kg (184 lb 12.8 oz)     Lab Results   Component Value Date    GLUCOSE 165 (H) 08/01/2025    BUN 64.3 (H) 08/01/2025    CREATININE 1.76 (H) 08/01/2025    BCR 36.5 (H) 08/01/2025    K 4.3 08/01/2025    CO2 22.5 08/01/2025    CALCIUM 9.0 08/01/2025    ALBUMIN 2.8 (L) 07/01/2025    AST 21 07/01/2025    ALT 37 07/01/2025     Lab Results   Component Value Date    WBC 13.71 (H) 07/08/2025    HGB 9.4 (L) 07/08/2025    HCT 32.1 (L) 07/08/2025    MCV 89.9 07/08/2025     07/08/2025     Lab Results   Component Value Date    TROPONINT 37 (H) 07/01/2025     Lab Results   Component Value Date    " PROBNP 2,028.0 (H) 08/01/2025     Results for orders placed during the hospital encounter of 06/30/25    Adult Transthoracic Echo Complete W/ Cont if Necessary Per Protocol 07/01/2025  3:03 PM    Interpretation Summary    Left ventricular systolic function is normal. Calculated left ventricular EF = 61.5%    Left ventricular wall thickness is consistent with hypertrophy.    Left ventricular diastolic function was normal.    The left atrial cavity is dilated.    Left atrial volume is mildly increased.    Moderate aortic valve stenosis is present.    Aortic valve maximum pressure gradient is 38 mmHg. Aortic valve mean pressure gradient is 20 mmHg.    Systolic anterior motion of the anterior mitral leaflet is present.         GDMT     Drug Class   Drug   Dose Last Dose Adjustment Additional Titration   Notes   ACEi/ARB/ARNI        Beta Blocker Coreg 12.5mg 7/9/25     MRA        SGLT2i     Jardi- $114  Farx- $108 as of 8/1/25    Hydralazine  50mg tid 8/1/25 increased      Diltiazem  360mg      Torsemide 10mg daily + PRN       Drug Therapy Problems    1. Drug Interactions Screening  2. Drug-Disease Interactions: Terazosin  3. GDMT/HTN  4. Edema  5. Kidney function       Recommendations:     No significant interactions  2. Patient is aware to avoid NSAIDS, Recommend stopping Terazosin as it can exacerbate underlying myocardial dysfunction.   3. Carlie to increase Hydralazine dose and stop Terazosin (recently added on for HTN)  4. Continue current dosing  5. Improved since discharge- Scr 1.76, eGFR 40.1      Patient was educated on heart failure medications and the importance of medication adherence. All questions were addressed and patient expressed understanding.   Thank you for allowing me to participate in the care of your patient,    Fanny Spears, KeerthiD  08/01/25  16:12 EDT

## (undated) DEVICE — RADIFOCUS TORQUE DEVICE MULTI-TORQUE VISE: Brand: RADIFOCUS TORQUE DEVICE

## (undated) DEVICE — PINNACLE INTRODUCER SHEATH: Brand: PINNACLE

## (undated) DEVICE — PK CATH CARD 10

## (undated) DEVICE — CATH DIAG EXPO M/ PK 6FR FL4/FR4 PIG 3PK

## (undated) DEVICE — GW PERIPH VASC ADX J/TP SS .035 150CM 3MM

## (undated) DEVICE — NDL PERC 1PRT THNWALL W/BASEPLT 18G 7CM

## (undated) DEVICE — LHK- LEFT HEART KIT BAPTIST: Brand: MEDLINE INDUSTRIES, INC.

## (undated) DEVICE — RADIFOCUS GLIDEWIRE: Brand: GLIDEWIRE